# Patient Record
Sex: FEMALE | Race: WHITE | NOT HISPANIC OR LATINO | Employment: OTHER | URBAN - METROPOLITAN AREA
[De-identification: names, ages, dates, MRNs, and addresses within clinical notes are randomized per-mention and may not be internally consistent; named-entity substitution may affect disease eponyms.]

---

## 2017-06-14 ENCOUNTER — CONVERSION ENCOUNTER (OUTPATIENT)
Dept: GENERAL RADIOLOGY | Facility: HOSPITAL | Age: 62
End: 2017-06-14

## 2018-06-18 ENCOUNTER — OFFICE VISIT CONVERTED (OUTPATIENT)
Dept: FAMILY MEDICINE CLINIC | Facility: CLINIC | Age: 63
End: 2018-06-18
Attending: NURSE PRACTITIONER

## 2018-06-20 ENCOUNTER — CONVERSION ENCOUNTER (OUTPATIENT)
Dept: GENERAL RADIOLOGY | Facility: HOSPITAL | Age: 63
End: 2018-06-20

## 2018-07-03 ENCOUNTER — CONVERSION ENCOUNTER (OUTPATIENT)
Dept: FAMILY MEDICINE CLINIC | Facility: CLINIC | Age: 63
End: 2018-07-03

## 2018-07-03 ENCOUNTER — OFFICE VISIT CONVERTED (OUTPATIENT)
Dept: FAMILY MEDICINE CLINIC | Facility: CLINIC | Age: 63
End: 2018-07-03
Attending: NURSE PRACTITIONER

## 2018-09-17 ENCOUNTER — OFFICE VISIT CONVERTED (OUTPATIENT)
Dept: FAMILY MEDICINE CLINIC | Facility: CLINIC | Age: 63
End: 2018-09-17
Attending: NURSE PRACTITIONER

## 2018-09-17 ENCOUNTER — CONVERSION ENCOUNTER (OUTPATIENT)
Dept: FAMILY MEDICINE CLINIC | Facility: CLINIC | Age: 63
End: 2018-09-17

## 2018-09-28 ENCOUNTER — OFFICE VISIT CONVERTED (OUTPATIENT)
Dept: ORTHOPEDIC SURGERY | Facility: CLINIC | Age: 63
End: 2018-09-28
Attending: ORTHOPAEDIC SURGERY

## 2018-10-03 ENCOUNTER — CONVERSION ENCOUNTER (OUTPATIENT)
Dept: FAMILY MEDICINE CLINIC | Facility: CLINIC | Age: 63
End: 2018-10-03

## 2018-10-03 ENCOUNTER — OFFICE VISIT CONVERTED (OUTPATIENT)
Dept: FAMILY MEDICINE CLINIC | Facility: CLINIC | Age: 63
End: 2018-10-03
Attending: NURSE PRACTITIONER

## 2018-10-15 ENCOUNTER — OFFICE VISIT CONVERTED (OUTPATIENT)
Dept: ORTHOPEDIC SURGERY | Facility: CLINIC | Age: 63
End: 2018-10-15
Attending: ORTHOPAEDIC SURGERY

## 2018-12-18 ENCOUNTER — OFFICE VISIT CONVERTED (OUTPATIENT)
Dept: FAMILY MEDICINE CLINIC | Facility: CLINIC | Age: 63
End: 2018-12-18
Attending: FAMILY MEDICINE

## 2019-03-18 ENCOUNTER — OFFICE VISIT CONVERTED (OUTPATIENT)
Dept: FAMILY MEDICINE CLINIC | Facility: CLINIC | Age: 64
End: 2019-03-18
Attending: FAMILY MEDICINE

## 2019-03-18 ENCOUNTER — CONVERSION ENCOUNTER (OUTPATIENT)
Dept: FAMILY MEDICINE CLINIC | Facility: CLINIC | Age: 64
End: 2019-03-18

## 2019-04-22 ENCOUNTER — OFFICE VISIT CONVERTED (OUTPATIENT)
Dept: FAMILY MEDICINE CLINIC | Facility: CLINIC | Age: 64
End: 2019-04-22
Attending: NURSE PRACTITIONER

## 2019-05-02 ENCOUNTER — HOSPITAL ENCOUNTER (OUTPATIENT)
Dept: OTHER | Facility: HOSPITAL | Age: 64
Discharge: HOME | End: 2019-05-02
Attending: FAMILY MEDICINE

## 2019-05-02 ENCOUNTER — CONVERSION ENCOUNTER (OUTPATIENT)
Dept: FAMILY MEDICINE CLINIC | Facility: CLINIC | Age: 64
End: 2019-05-02

## 2019-05-02 ENCOUNTER — HOSPITAL ENCOUNTER (OUTPATIENT)
Dept: FAMILY MEDICINE CLINIC | Facility: CLINIC | Age: 64
Discharge: HOME | End: 2019-05-02
Attending: FAMILY MEDICINE

## 2019-05-02 ENCOUNTER — OFFICE VISIT CONVERTED (OUTPATIENT)
Dept: FAMILY MEDICINE CLINIC | Facility: CLINIC | Age: 64
End: 2019-05-02
Attending: FAMILY MEDICINE

## 2019-05-02 LAB
25(OH)D3 SERPL-MCNC: 35.2 NG/ML (ref 30–100)
ALBUMIN SERPL-MCNC: 4.6 G/DL (ref 3.5–5)
ALBUMIN/GLOB SERPL: 1.5 {RATIO} (ref 1.4–2.6)
ALP SERPL-CCNC: 78 U/L (ref 43–160)
ALT SERPL-CCNC: 22 U/L (ref 10–40)
ANION GAP SERPL CALC-SCNC: 19 MMOL/L (ref 8–19)
APPEARANCE UR: CLEAR
AST SERPL-CCNC: 23 U/L (ref 15–50)
BASOPHILS # BLD AUTO: 0.07 10*3/UL (ref 0–0.2)
BASOPHILS NFR BLD AUTO: 0.5 % (ref 0–3)
BILIRUB SERPL-MCNC: 0.42 MG/DL (ref 0.2–1.3)
BILIRUB UR QL: NEGATIVE
BUN SERPL-MCNC: 25 MG/DL (ref 5–25)
BUN/CREAT SERPL: 13 {RATIO} (ref 6–20)
CALCIUM SERPL-MCNC: 10.5 MG/DL (ref 8.7–10.4)
CHLORIDE SERPL-SCNC: 98 MMOL/L (ref 99–111)
CHOLEST SERPL-MCNC: 174 MG/DL (ref 107–200)
CHOLEST/HDLC SERPL: 5.4 {RATIO} (ref 3–6)
COLOR UR: YELLOW
CONV ABS IMM GRAN: 0.05 10*3/UL (ref 0–0.2)
CONV BACTERIA: NEGATIVE
CONV CO2: 23 MMOL/L (ref 22–32)
CONV COLLECTION SOURCE (UA): ABNORMAL
CONV CREATININE URINE, RANDOM: 134.2 MG/DL (ref 10–300)
CONV IMMATURE GRAN: 0.4 % (ref 0–1.8)
CONV MICROALBUM.,U,RANDOM: <12 MG/L (ref 0–20)
CONV TOTAL PROTEIN: 7.7 G/DL (ref 6.3–8.2)
CONV UROBILINOGEN IN URINE BY AUTOMATED TEST STRIP: 0.2 {EHRLICHU}/DL (ref 0.1–1)
CREAT UR-MCNC: 1.92 MG/DL (ref 0.5–0.9)
DEPRECATED RDW RBC AUTO: 43.5 FL (ref 36.4–46.3)
EOSINOPHIL # BLD AUTO: 0 % (ref 0–7)
EOSINOPHIL # BLD AUTO: 0 10*3/UL (ref 0–0.7)
ERYTHROCYTE [DISTWIDTH] IN BLOOD BY AUTOMATED COUNT: 12.9 % (ref 11.7–14.4)
EST. AVERAGE GLUCOSE BLD GHB EST-MCNC: 126 MG/DL
GFR SERPLBLD BASED ON 1.73 SQ M-ARVRAT: 27 ML/MIN/{1.73_M2}
GLOBULIN UR ELPH-MCNC: 3.1 G/DL (ref 2–3.5)
GLUCOSE SERPL-MCNC: 138 MG/DL (ref 65–99)
GLUCOSE UR QL: NEGATIVE MG/DL
HBA1C MFR BLD: 14.5 G/DL (ref 12–16)
HBA1C MFR BLD: 6 % (ref 3.5–5.7)
HCT VFR BLD AUTO: 46 % (ref 37–47)
HDLC SERPL-MCNC: 32 MG/DL (ref 40–60)
HGB UR QL STRIP: NEGATIVE
KETONES UR QL STRIP: NEGATIVE MG/DL
LDLC SERPL CALC-MCNC: 106 MG/DL (ref 70–100)
LEUKOCYTE ESTERASE UR QL STRIP: ABNORMAL
LYMPHOCYTES # BLD AUTO: 3.66 10*3/UL (ref 1–5)
MCH RBC QN AUTO: 29 PG (ref 27–31)
MCHC RBC AUTO-ENTMCNC: 31.5 G/DL (ref 33–37)
MCV RBC AUTO: 92 FL (ref 81–99)
MICROALBUMIN/CREAT UR: 8.9 MG/G{CRE} (ref 0–35)
MONOCYTES # BLD AUTO: 0.91 10*3/UL (ref 0.2–1.2)
MONOCYTES NFR BLD AUTO: 7 % (ref 3–10)
NEUTROPHILS # BLD AUTO: 8.25 10*3/UL (ref 2–8)
NEUTROPHILS NFR BLD AUTO: 63.8 % (ref 30–85)
NITRITE UR QL STRIP: NEGATIVE
NRBC CBCN: 0 % (ref 0–0.7)
OSMOLALITY SERPL CALC.SUM OF ELEC: 287 MOSM/KG (ref 273–304)
PH UR STRIP.AUTO: 5 [PH] (ref 5–8)
PLATELET # BLD AUTO: 335 10*3/UL (ref 130–400)
PMV BLD AUTO: 11.4 FL (ref 9.4–12.3)
POTASSIUM SERPL-SCNC: 5.3 MMOL/L (ref 3.5–5.3)
PROT UR QL: NEGATIVE MG/DL
RBC # BLD AUTO: 5 10*6/UL (ref 4.2–5.4)
RBC #/AREA URNS HPF: ABNORMAL /[HPF]
SODIUM SERPL-SCNC: 135 MMOL/L (ref 135–147)
SP GR UR: 1.02 (ref 1–1.03)
SQUAMOUS SPT QL MICRO: ABNORMAL /[HPF]
TRIGL SERPL-MCNC: 182 MG/DL (ref 40–150)
VARIANT LYMPHS NFR BLD MANUAL: 28.3 % (ref 20–45)
VLDLC SERPL-MCNC: 36 MG/DL (ref 5–37)
WBC # BLD AUTO: 12.94 10*3/UL (ref 4.8–10.8)
WBC #/AREA URNS HPF: ABNORMAL /[HPF]

## 2019-05-04 LAB — BACTERIA SPEC AEROBE CULT: NORMAL

## 2019-05-10 ENCOUNTER — HOSPITAL ENCOUNTER (OUTPATIENT)
Dept: OTHER | Facility: HOSPITAL | Age: 64
Discharge: HOME | End: 2019-05-10
Attending: FAMILY MEDICINE

## 2019-05-10 LAB
ALBUMIN SERPL-MCNC: 4.2 G/DL (ref 3.5–5)
ALBUMIN/GLOB SERPL: 1.6 {RATIO} (ref 1.4–2.6)
ALP SERPL-CCNC: 74 U/L (ref 43–160)
ALT SERPL-CCNC: 21 U/L (ref 10–40)
ANION GAP SERPL CALC-SCNC: 17 MMOL/L (ref 8–19)
AST SERPL-CCNC: 19 U/L (ref 15–50)
BILIRUB SERPL-MCNC: 0.4 MG/DL (ref 0.2–1.3)
BUN SERPL-MCNC: 24 MG/DL (ref 5–25)
BUN/CREAT SERPL: 20 {RATIO} (ref 6–20)
CALCIUM SERPL-MCNC: 9.3 MG/DL (ref 8.7–10.4)
CHLORIDE SERPL-SCNC: 105 MMOL/L (ref 99–111)
CONV CO2: 23 MMOL/L (ref 22–32)
CONV TOTAL PROTEIN: 6.8 G/DL (ref 6.3–8.2)
CREAT UR-MCNC: 1.21 MG/DL (ref 0.5–0.9)
GFR SERPLBLD BASED ON 1.73 SQ M-ARVRAT: 47 ML/MIN/{1.73_M2}
GLOBULIN UR ELPH-MCNC: 2.6 G/DL (ref 2–3.5)
GLUCOSE SERPL-MCNC: 214 MG/DL (ref 65–99)
OSMOLALITY SERPL CALC.SUM OF ELEC: 300 MOSM/KG (ref 273–304)
POTASSIUM SERPL-SCNC: 4.5 MMOL/L (ref 3.5–5.3)
SODIUM SERPL-SCNC: 140 MMOL/L (ref 135–147)

## 2019-06-18 ENCOUNTER — HOSPITAL ENCOUNTER (OUTPATIENT)
Dept: FAMILY MEDICINE CLINIC | Facility: CLINIC | Age: 64
Discharge: HOME | End: 2019-06-18
Attending: FAMILY MEDICINE

## 2019-06-18 ENCOUNTER — OFFICE VISIT CONVERTED (OUTPATIENT)
Dept: FAMILY MEDICINE CLINIC | Facility: CLINIC | Age: 64
End: 2019-06-18
Attending: FAMILY MEDICINE

## 2019-06-24 ENCOUNTER — HOSPITAL ENCOUNTER (OUTPATIENT)
Dept: GENERAL RADIOLOGY | Facility: HOSPITAL | Age: 64
Discharge: HOME | End: 2019-06-24
Attending: FAMILY MEDICINE

## 2019-06-26 LAB
CONV LAST MENSTURAL PERIOD: NORMAL
SPECIMEN SOURCE: NORMAL
SPECIMEN SOURCE: NORMAL
THIN PREP CVX: NORMAL

## 2019-07-09 ENCOUNTER — HOSPITAL ENCOUNTER (OUTPATIENT)
Dept: OTHER | Facility: HOSPITAL | Age: 64
Discharge: HOME | End: 2019-07-09
Attending: FAMILY MEDICINE

## 2019-07-09 LAB
ANION GAP SERPL CALC-SCNC: 15 MMOL/L (ref 8–19)
BUN SERPL-MCNC: 16 MG/DL (ref 5–25)
BUN/CREAT SERPL: 12 {RATIO} (ref 6–20)
CALCIUM SERPL-MCNC: 9.4 MG/DL (ref 8.7–10.4)
CHLORIDE SERPL-SCNC: 105 MMOL/L (ref 99–111)
CONV CO2: 26 MMOL/L (ref 22–32)
CREAT UR-MCNC: 1.36 MG/DL (ref 0.5–0.9)
GFR SERPLBLD BASED ON 1.73 SQ M-ARVRAT: 41 ML/MIN/{1.73_M2}
GLUCOSE SERPL-MCNC: 150 MG/DL (ref 65–99)
OSMOLALITY SERPL CALC.SUM OF ELEC: 296 MOSM/KG (ref 273–304)
POTASSIUM SERPL-SCNC: 5 MMOL/L (ref 3.5–5.3)
SODIUM SERPL-SCNC: 141 MMOL/L (ref 135–147)

## 2019-07-16 ENCOUNTER — CONVERSION ENCOUNTER (OUTPATIENT)
Dept: FAMILY MEDICINE CLINIC | Facility: CLINIC | Age: 64
End: 2019-07-16

## 2019-07-16 ENCOUNTER — OFFICE VISIT CONVERTED (OUTPATIENT)
Dept: FAMILY MEDICINE CLINIC | Facility: CLINIC | Age: 64
End: 2019-07-16
Attending: FAMILY MEDICINE

## 2020-05-06 ENCOUNTER — OFFICE VISIT CONVERTED (OUTPATIENT)
Dept: FAMILY MEDICINE CLINIC | Facility: CLINIC | Age: 65
End: 2020-05-06
Attending: FAMILY MEDICINE

## 2020-06-11 ENCOUNTER — HOSPITAL ENCOUNTER (OUTPATIENT)
Dept: FAMILY MEDICINE CLINIC | Facility: CLINIC | Age: 65
Discharge: HOME | End: 2020-06-11
Attending: FAMILY MEDICINE

## 2020-06-11 LAB
ALBUMIN SERPL-MCNC: 4.5 G/DL (ref 3.5–5)
ALBUMIN/GLOB SERPL: 1.7 {RATIO} (ref 1.4–2.6)
ALP SERPL-CCNC: 68 U/L (ref 43–160)
ALT SERPL-CCNC: 36 U/L (ref 10–40)
ANION GAP SERPL CALC-SCNC: 16 MMOL/L (ref 8–19)
APPEARANCE UR: CLEAR
AST SERPL-CCNC: 44 U/L (ref 15–50)
BASOPHILS # BLD AUTO: 0.05 10*3/UL (ref 0–0.2)
BASOPHILS NFR BLD AUTO: 0.7 % (ref 0–3)
BILIRUB SERPL-MCNC: 0.53 MG/DL (ref 0.2–1.3)
BILIRUB UR QL: NEGATIVE
BUN SERPL-MCNC: 18 MG/DL (ref 5–25)
BUN/CREAT SERPL: 15 {RATIO} (ref 6–20)
CALCIUM SERPL-MCNC: 9.9 MG/DL (ref 8.7–10.4)
CHLORIDE SERPL-SCNC: 103 MMOL/L (ref 99–111)
CHOLEST SERPL-MCNC: 165 MG/DL (ref 107–200)
CHOLEST/HDLC SERPL: 5.2 {RATIO} (ref 3–6)
COLOR UR: YELLOW
CONV ABS IMM GRAN: 0.04 10*3/UL (ref 0–0.2)
CONV CO2: 25 MMOL/L (ref 22–32)
CONV COLLECTION SOURCE (UA): NORMAL
CONV CREATININE URINE, RANDOM: 96.2 MG/DL (ref 10–300)
CONV IMMATURE GRAN: 0.6 % (ref 0–1.8)
CONV MICROALBUM.,U,RANDOM: <12 MG/L (ref 0–20)
CONV TOTAL PROTEIN: 7.2 G/DL (ref 6.3–8.2)
CONV UROBILINOGEN IN URINE BY AUTOMATED TEST STRIP: 0.2 {EHRLICHU}/DL (ref 0.1–1)
CREAT UR-MCNC: 1.24 MG/DL (ref 0.5–0.9)
DEPRECATED RDW RBC AUTO: 43.8 FL (ref 36.4–46.3)
EOSINOPHIL # BLD AUTO: 0 % (ref 0–7)
EOSINOPHIL # BLD AUTO: 0 10*3/UL (ref 0–0.7)
ERYTHROCYTE [DISTWIDTH] IN BLOOD BY AUTOMATED COUNT: 13.2 % (ref 11.7–14.4)
EST. AVERAGE GLUCOSE BLD GHB EST-MCNC: 192 MG/DL
GFR SERPLBLD BASED ON 1.73 SQ M-ARVRAT: 46 ML/MIN/{1.73_M2}
GLOBULIN UR ELPH-MCNC: 2.7 G/DL (ref 2–3.5)
GLUCOSE SERPL-MCNC: 168 MG/DL (ref 65–99)
GLUCOSE UR QL: NEGATIVE MG/DL
HBA1C MFR BLD: 8.3 % (ref 3.5–5.7)
HCT VFR BLD AUTO: 39.8 % (ref 37–47)
HDLC SERPL-MCNC: 32 MG/DL (ref 40–60)
HGB BLD-MCNC: 12.7 G/DL (ref 12–16)
HGB UR QL STRIP: NEGATIVE
KETONES UR QL STRIP: NEGATIVE MG/DL
LDLC SERPL CALC-MCNC: 91 MG/DL (ref 70–100)
LEUKOCYTE ESTERASE UR QL STRIP: NEGATIVE
LYMPHOCYTES # BLD AUTO: 2.03 10*3/UL (ref 1–5)
LYMPHOCYTES NFR BLD AUTO: 30.1 % (ref 20–45)
MCH RBC QN AUTO: 29.2 PG (ref 27–31)
MCHC RBC AUTO-ENTMCNC: 31.9 G/DL (ref 33–37)
MCV RBC AUTO: 91.5 FL (ref 81–99)
MICROALBUMIN/CREAT UR: 12.5 MG/G{CRE} (ref 0–35)
MONOCYTES # BLD AUTO: 0.66 10*3/UL (ref 0.2–1.2)
MONOCYTES NFR BLD AUTO: 9.8 % (ref 3–10)
NEUTROPHILS # BLD AUTO: 3.96 10*3/UL (ref 2–8)
NEUTROPHILS NFR BLD AUTO: 58.8 % (ref 30–85)
NITRITE UR QL STRIP: NEGATIVE
NRBC CBCN: 0 % (ref 0–0.7)
OSMOLALITY SERPL CALC.SUM OF ELEC: 294 MOSM/KG (ref 273–304)
PH UR STRIP.AUTO: 7 [PH] (ref 5–8)
PLATELET # BLD AUTO: 251 10*3/UL (ref 130–400)
PMV BLD AUTO: 10.8 FL (ref 9.4–12.3)
POTASSIUM SERPL-SCNC: 4.5 MMOL/L (ref 3.5–5.3)
PROT UR QL: NEGATIVE MG/DL
RBC # BLD AUTO: 4.35 10*6/UL (ref 4.2–5.4)
SODIUM SERPL-SCNC: 139 MMOL/L (ref 135–147)
SP GR UR: 1.02 (ref 1–1.03)
TRIGL SERPL-MCNC: 212 MG/DL (ref 40–150)
VLDLC SERPL-MCNC: 42 MG/DL (ref 5–37)
WBC # BLD AUTO: 6.74 10*3/UL (ref 4.8–10.8)

## 2020-06-15 ENCOUNTER — OFFICE VISIT CONVERTED (OUTPATIENT)
Dept: FAMILY MEDICINE CLINIC | Facility: CLINIC | Age: 65
End: 2020-06-15
Attending: FAMILY MEDICINE

## 2020-09-10 ENCOUNTER — HOSPITAL ENCOUNTER (OUTPATIENT)
Dept: GENERAL RADIOLOGY | Facility: HOSPITAL | Age: 65
Discharge: HOME | End: 2020-09-10
Attending: FAMILY MEDICINE

## 2020-09-10 ENCOUNTER — HOSPITAL ENCOUNTER (OUTPATIENT)
Dept: FAMILY MEDICINE CLINIC | Facility: CLINIC | Age: 65
Discharge: HOME | End: 2020-09-10
Attending: FAMILY MEDICINE

## 2020-09-10 LAB
ALBUMIN SERPL-MCNC: 4.3 G/DL (ref 3.5–5)
ALBUMIN/GLOB SERPL: 1.6 {RATIO} (ref 1.4–2.6)
ALP SERPL-CCNC: 189 U/L (ref 43–160)
ALT SERPL-CCNC: 85 U/L (ref 10–40)
ANION GAP SERPL CALC-SCNC: 19 MMOL/L (ref 8–19)
AST SERPL-CCNC: 63 U/L (ref 15–50)
BILIRUB SERPL-MCNC: 0.8 MG/DL (ref 0.2–1.3)
BUN SERPL-MCNC: 13 MG/DL (ref 5–25)
BUN/CREAT SERPL: 14 {RATIO} (ref 6–20)
CALCIUM SERPL-MCNC: 9.5 MG/DL (ref 8.7–10.4)
CHLORIDE SERPL-SCNC: 101 MMOL/L (ref 99–111)
CHOLEST SERPL-MCNC: 141 MG/DL (ref 107–200)
CHOLEST/HDLC SERPL: 4 {RATIO} (ref 3–6)
CONV CO2: 22 MMOL/L (ref 22–32)
CONV TOTAL PROTEIN: 7 G/DL (ref 6.3–8.2)
CREAT UR-MCNC: 0.93 MG/DL (ref 0.5–0.9)
EST. AVERAGE GLUCOSE BLD GHB EST-MCNC: 209 MG/DL
GFR SERPLBLD BASED ON 1.73 SQ M-ARVRAT: >60 ML/MIN/{1.73_M2}
GLOBULIN UR ELPH-MCNC: 2.7 G/DL (ref 2–3.5)
GLUCOSE SERPL-MCNC: 222 MG/DL (ref 65–99)
HBA1C MFR BLD: 8.9 % (ref 3.5–5.7)
HDLC SERPL-MCNC: 35 MG/DL (ref 40–60)
LDLC SERPL CALC-MCNC: 80 MG/DL (ref 70–100)
OSMOLALITY SERPL CALC.SUM OF ELEC: 291 MOSM/KG (ref 273–304)
POTASSIUM SERPL-SCNC: 4.5 MMOL/L (ref 3.5–5.3)
SODIUM SERPL-SCNC: 137 MMOL/L (ref 135–147)
TRIGL SERPL-MCNC: 130 MG/DL (ref 40–150)
VLDLC SERPL-MCNC: 26 MG/DL (ref 5–37)

## 2020-09-14 ENCOUNTER — OFFICE VISIT CONVERTED (OUTPATIENT)
Dept: FAMILY MEDICINE CLINIC | Facility: CLINIC | Age: 65
End: 2020-09-14
Attending: FAMILY MEDICINE

## 2020-11-19 ENCOUNTER — HOSPITAL ENCOUNTER (OUTPATIENT)
Dept: FAMILY MEDICINE CLINIC | Facility: CLINIC | Age: 65
Discharge: HOME | End: 2020-11-19
Attending: FAMILY MEDICINE

## 2020-11-19 LAB
ALBUMIN SERPL-MCNC: 3.8 G/DL (ref 3.5–5)
ALBUMIN/GLOB SERPL: 1.3 {RATIO} (ref 1.4–2.6)
ALP SERPL-CCNC: 102 U/L (ref 43–160)
ALT SERPL-CCNC: 36 U/L (ref 10–40)
ANION GAP SERPL CALC-SCNC: 15 MMOL/L (ref 8–19)
AST SERPL-CCNC: 36 U/L (ref 15–50)
BASOPHILS # BLD AUTO: 0.02 10*3/UL (ref 0–0.2)
BASOPHILS NFR BLD AUTO: 0.4 % (ref 0–3)
BILIRUB SERPL-MCNC: 0.38 MG/DL (ref 0.2–1.3)
BUN SERPL-MCNC: 14 MG/DL (ref 5–25)
BUN/CREAT SERPL: 12 {RATIO} (ref 6–20)
CALCIUM SERPL-MCNC: 10 MG/DL (ref 8.7–10.4)
CHLORIDE SERPL-SCNC: 103 MMOL/L (ref 99–111)
CONV ABS IMM GRAN: 0.02 10*3/UL (ref 0–0.2)
CONV CO2: 24 MMOL/L (ref 22–32)
CONV IMMATURE GRAN: 0.4 % (ref 0–1.8)
CONV TOTAL PROTEIN: 6.8 G/DL (ref 6.3–8.2)
CREAT UR-MCNC: 1.19 MG/DL (ref 0.5–0.9)
DEPRECATED RDW RBC AUTO: 45.1 FL (ref 36.4–46.3)
EOSINOPHIL # BLD AUTO: 0 % (ref 0–7)
EOSINOPHIL # BLD AUTO: 0 10*3/UL (ref 0–0.7)
ERYTHROCYTE [DISTWIDTH] IN BLOOD BY AUTOMATED COUNT: 13.6 % (ref 11.7–14.4)
GFR SERPLBLD BASED ON 1.73 SQ M-ARVRAT: 48 ML/MIN/{1.73_M2}
GLOBULIN UR ELPH-MCNC: 3 G/DL (ref 2–3.5)
GLUCOSE SERPL-MCNC: 155 MG/DL (ref 65–99)
HCT VFR BLD AUTO: 39 % (ref 37–47)
HGB BLD-MCNC: 12.1 G/DL (ref 12–16)
LYMPHOCYTES # BLD AUTO: 2.03 10*3/UL (ref 1–5)
LYMPHOCYTES NFR BLD AUTO: 36.7 % (ref 20–45)
MCH RBC QN AUTO: 28.1 PG (ref 27–31)
MCHC RBC AUTO-ENTMCNC: 31 G/DL (ref 33–37)
MCV RBC AUTO: 90.5 FL (ref 81–99)
MONOCYTES # BLD AUTO: 0.5 10*3/UL (ref 0.2–1.2)
MONOCYTES NFR BLD AUTO: 9 % (ref 3–10)
NEUTROPHILS # BLD AUTO: 2.96 10*3/UL (ref 2–8)
NEUTROPHILS NFR BLD AUTO: 53.5 % (ref 30–85)
NRBC CBCN: 0 % (ref 0–0.7)
OSMOLALITY SERPL CALC.SUM OF ELEC: 288 MOSM/KG (ref 273–304)
PLATELET # BLD AUTO: 201 10*3/UL (ref 130–400)
PMV BLD AUTO: 11.2 FL (ref 9.4–12.3)
POTASSIUM SERPL-SCNC: 4.8 MMOL/L (ref 3.5–5.3)
RBC # BLD AUTO: 4.31 10*6/UL (ref 4.2–5.4)
SODIUM SERPL-SCNC: 137 MMOL/L (ref 135–147)
WBC # BLD AUTO: 5.53 10*3/UL (ref 4.8–10.8)

## 2020-12-14 ENCOUNTER — TELEPHONE CONVERTED (OUTPATIENT)
Dept: FAMILY MEDICINE CLINIC | Facility: CLINIC | Age: 65
End: 2020-12-14
Attending: FAMILY MEDICINE

## 2021-01-18 ENCOUNTER — HOSPITAL ENCOUNTER (OUTPATIENT)
Dept: URGENT CARE | Facility: CLINIC | Age: 66
Discharge: HOME | End: 2021-01-18
Attending: FAMILY MEDICINE

## 2021-01-27 ENCOUNTER — CONVERSION ENCOUNTER (OUTPATIENT)
Dept: ORTHOPEDIC SURGERY | Facility: CLINIC | Age: 66
End: 2021-01-27

## 2021-01-27 ENCOUNTER — OFFICE VISIT CONVERTED (OUTPATIENT)
Dept: ORTHOPEDIC SURGERY | Facility: CLINIC | Age: 66
End: 2021-01-27
Attending: PHYSICIAN ASSISTANT

## 2021-02-10 ENCOUNTER — CONVERSION ENCOUNTER (OUTPATIENT)
Dept: ORTHOPEDIC SURGERY | Facility: CLINIC | Age: 66
End: 2021-02-10

## 2021-02-10 ENCOUNTER — OFFICE VISIT CONVERTED (OUTPATIENT)
Dept: ORTHOPEDIC SURGERY | Facility: CLINIC | Age: 66
End: 2021-02-10
Attending: PHYSICIAN ASSISTANT

## 2021-03-15 ENCOUNTER — HOSPITAL ENCOUNTER (OUTPATIENT)
Dept: FAMILY MEDICINE CLINIC | Facility: CLINIC | Age: 66
Discharge: HOME | End: 2021-03-15
Attending: FAMILY MEDICINE

## 2021-03-15 ENCOUNTER — OFFICE VISIT CONVERTED (OUTPATIENT)
Dept: FAMILY MEDICINE CLINIC | Facility: CLINIC | Age: 66
End: 2021-03-15
Attending: FAMILY MEDICINE

## 2021-03-15 LAB
ALBUMIN SERPL-MCNC: 4.6 G/DL (ref 3.5–5)
ALBUMIN/GLOB SERPL: 1.6 {RATIO} (ref 1.4–2.6)
ALP SERPL-CCNC: 81 U/L (ref 43–160)
ALT SERPL-CCNC: 24 U/L (ref 10–40)
ANION GAP SERPL CALC-SCNC: 17 MMOL/L (ref 8–19)
APPEARANCE UR: ABNORMAL
AST SERPL-CCNC: 22 U/L (ref 15–50)
BILIRUB SERPL-MCNC: 0.38 MG/DL (ref 0.2–1.3)
BILIRUB UR QL: NEGATIVE
BUN SERPL-MCNC: 13 MG/DL (ref 5–25)
BUN/CREAT SERPL: 13 {RATIO} (ref 6–20)
CALCIUM SERPL-MCNC: 9.8 MG/DL (ref 8.7–10.4)
CHLORIDE SERPL-SCNC: 100 MMOL/L (ref 99–111)
COLOR UR: YELLOW
CONV BACTERIA: NEGATIVE
CONV CO2: 24 MMOL/L (ref 22–32)
CONV COLLECTION SOURCE (UA): ABNORMAL
CONV CRYSTALS: ABNORMAL /[HPF]
CONV HYALINE CASTS IN URINE MICRO: ABNORMAL /[LPF]
CONV TOTAL PROTEIN: 7.5 G/DL (ref 6.3–8.2)
CONV UROBILINOGEN IN URINE BY AUTOMATED TEST STRIP: 0.2 {EHRLICHU}/DL (ref 0.1–1)
CREAT UR-MCNC: 0.99 MG/DL (ref 0.5–0.9)
EST. AVERAGE GLUCOSE BLD GHB EST-MCNC: 177 MG/DL
GFR SERPLBLD BASED ON 1.73 SQ M-ARVRAT: 60 ML/MIN/{1.73_M2}
GLOBULIN UR ELPH-MCNC: 2.9 G/DL (ref 2–3.5)
GLUCOSE SERPL-MCNC: 127 MG/DL (ref 65–99)
GLUCOSE UR QL: NEGATIVE MG/DL
HBA1C MFR BLD: 7.8 % (ref 3.5–5.7)
HGB UR QL STRIP: NEGATIVE
KETONES UR QL STRIP: NEGATIVE MG/DL
LEUKOCYTE ESTERASE UR QL STRIP: ABNORMAL
NITRITE UR QL STRIP: NEGATIVE
OSMOLALITY SERPL CALC.SUM OF ELEC: 286 MOSM/KG (ref 273–304)
PH UR STRIP.AUTO: 5 [PH] (ref 5–8)
POTASSIUM SERPL-SCNC: 4.2 MMOL/L (ref 3.5–5.3)
PROT UR QL: NEGATIVE MG/DL
RBC #/AREA URNS HPF: ABNORMAL /[HPF]
SODIUM SERPL-SCNC: 137 MMOL/L (ref 135–147)
SP GR UR: 1.02 (ref 1–1.03)
SQUAMOUS SPT QL MICRO: ABNORMAL /[HPF]
URATE SERPL-MCNC: 5.5 MG/DL (ref 2.5–7.5)
WBC #/AREA URNS HPF: ABNORMAL /[HPF]

## 2021-03-16 LAB
CONV CREATININE URINE, RANDOM: 151.5 MG/DL (ref 10–300)
CONV MICROALBUM.,U,RANDOM: <12 MG/L (ref 0–20)
MICROALBUMIN/CREAT UR: 7.9 MG/G{CRE} (ref 0–35)

## 2021-05-10 NOTE — H&P
History and Physical      Patient Name: Leilani Ordaz   Patient ID: [id number]   Sex: Female   YOB: 1955    Primary Care Provider: Js Tavares DO   Referring Provider: Js Tavares DO    Visit Date: January 27, 2021    Provider: Onelia Irwin PA-C   Location: [facility]   Location Address: [address] [place] [address]  [id number]   Location Phone: [phone]          Chief Complaint  · Left 3rd toe injury      History Of Present Illness  Leilani Ordaz is a 65 year old /White female who presents today to [facility].      She is here for evaluation of left middle toe injury sustained on 1/16/21, when she kicked a chair inadvertently.       Past Medical History  Arthritis; Breast cancer screening; Broken Bones; Cataract; Cervical cancer screening; Colon cancer screening; Diabetes; Diverticulosis; Ganglion cyst; Hyperlipemia; Hyperlipidemia; Hypertension; Hypertension, unspecified type; Limb Swelling; Migraine; Night sweats; Reflux; Screening for breast cancer; Seasonal allergies; Seizure; Sinus trouble; Skin Disease; Type 2 diabetes mellitus with stage 3 chronic kidney disease, without long-term current use of insulin         Past Surgical History  Colonoscopy         Medication List  aspirin 81 mg oral tablet,delayed release (DR/EC); atorvastatin 80 mg oral tablet; Claritin 10 mg oral tablet; Fish Oil oral; lidocaine 5 % topical adhesive patch,medicated; lisinopril 20 mg oral tablet; metformin 1,000 mg oral tablet; Women's Multivitamin 18 mg iron-400 mcg-500 mg oral tablet         Allergy List  Caltrate 600 + D       Allergies Reconciled  Family Medical History  Heart Disease; Diabetes, unspecified type; - No Family History of Colorectal Cancer; Diabetes; Family history of certain chronic disabling diseases; arthritis; Family history of Arthritis         Social History  Alcohol; Alcohol Use (Current some day); lives with spouse; .; Moderate  "Amount of Exercise (1-3 times weekly); Recreational Drug Use (Former); Tobacco (Never); Working         Review of Systems  · Constitutional  o Denies  o : fever, chills, weight loss  · Cardiovascular  o Denies  o : chest pain, shortness of breath  · Gastrointestinal  o Denies  o : liver disease, heartburn, nausea, blood in stools  · Genitourinary  o Denies  o : painful urination, blood in urine  · Integument  o Denies  o : rash, itching  · Neurologic  o Denies  o : headache, weakness, loss of consciousness  · Musculoskeletal  o Admits  o : painful, swollen joints  · Psychiatric  o Denies  o : drug/alcohol addiction, anxiety, depression      Vitals  Date Time BP Position Site L\R Cuff Size HR RR TEMP (F) WT  HT  BMI kg/m2 BSA m2 O2 Sat FR L/min FiO2 HC       01/27/2021 10:13 AM      79 - R   180lbs 0oz 5'  4\" 30.9 1.92 95 %            Physical Examination  · Constitutional  o Appearance  o : well developed, well-nourished, no obvious deformities present  · Head and Face  o Head  o :   § Inspection  § : normocephalic  o Face  o :   § Inspection  § : no facial lesions  · Eyes  o Conjunctivae  o : conjunctivae normal  o Sclerae  o : sclerae white  · Ears, Nose, Mouth and Throat  o Ears  o :   § External Ears  § : appearance within normal limits  § Hearing  § : intact  o Nose  o :   § External Nose  § : appearance normal  · Neck  o Inspection/Palpation  o : normal appearance  o Range of Motion  o : full range of motion  · Respiratory  o Respiratory Effort  o : breathing unlabored  o Inspection of Chest  o : normal appearance  o Auscultation of Lungs  o : no audible wheezing or rales  · Cardiovascular  o Heart  o : regular rate  · Gastrointestinal  o Abdominal Examination  o : soft and non-tender  · Skin and Subcutaneous Tissue  o General Inspection  o : intact, no rashes  · Psychiatric  o General  o : Alert and oriented x3  o Judgement and Insight  o : judgment and insight intact  o Mood and Affect  o : mood normal, " affect appropriate  · Left Ankle/Foot  o Inspection  o : Mild swelling proximal middle toe. Tender to palpation. Brisk capillary refill. Neurovascularly intact. Using flat soled shoe.           Assessment  · Left foot pain     729.5/M79.672  · Fracture of toe     826.0/S92.919A      Plan  · Medications  o Medications have been Reconciled  o Transition of Care or Provider Policy  · Instructions  o Reviewed the patient's Past Medical, Social, and Family history as well as the ROS at today's visit, no changes.  o Call or return if worsening symptoms.  o Continue xiomara taping and flat soled shoe. Follow up 2 weeks, repeat x-ray.  o Electronically Identified Patient Education Materials Provided Electronically            Electronically Signed by: Onelia Irwin PA-C -Author on January 27, 2021 10:34:38 AM

## 2021-05-13 NOTE — PROGRESS NOTES
Progress Note      Patient Name: Leilani Ordaz   Patient ID: [id number]   Sex: Female   YOB: 1955    Primary Care Provider: Js Tavares DO   Referring Provider: Js Tavares DO    Visit Date: May 6, 2020    Provider: Js Tavares DO   Location: [facility]   Location Address: [address] [place] [address], [address]  [id number]   Location Phone: [phone]          Chief Complaint  · F/U       History Of Present Illness  Leilani Ordaz is a 64 year old /White female who presents for evaluation and treatment of:      Today for checkup.  I have not seen her since July 2019.  She does need medications refilled and we discussed getting labs done.  Her blood pressure is elevated today.  She was checking her blood pressure at home but this was back around August September timeframe.  She does have some fluctuations including several that are within normal limits but then she also has several with a systolic being in the 130s to 140s and a couple isolated in the 150s.  She has a couple outlier blood pressures around 100/54.  She reports compliance with her medications.  Again discussed getting labs and having her bring a log to the next visit to reassess.  She will be due for mammogram around June 2020.  I will go ahead and place this order.  Patient had Pap smear last year.  She was negative for dysplasia.  Discussed repeating in 2022.  She is  and in a monogamous relationship.       Past Medical History  Arthritis; Breast cancer screening; Broken Bones; Cataract; Cervical cancer screening; Colon cancer screening; Diabetes; Diverticulosis; Ganglion cyst; Hyperlipemia; Hyperlipidemia; Hypertension; Hypertension, unspecified type; Limb Swelling; Migraine; Night sweats; Reflux; Screening for breast cancer; Seasonal allergies; Seizure; Sinus trouble; Skin Disease; Type 2 diabetes mellitus with stage 3 chronic kidney disease, without long-term current use of insulin  "        Past Surgical History  Colonoscopy         Medication List  aspirin 81 mg oral tablet,delayed release (DR/EC); Claritin 10 mg oral tablet; Fish Oil oral; lisinopril 20 mg oral tablet; metformin 1,000 mg oral tablet; pravastatin 80 mg oral tablet; Women's Multivitamin 18 mg iron-400 mcg-500 mg oral tablet         Allergy List  Caltrate 600 + D         Family Medical History  Heart Disease; Diabetes, unspecified type; - No Family History of Colorectal Cancer; Diabetes; Family history of certain chronic disabling diseases; arthritis         Social History  Alcohol; Alcohol Use (Current some day); lives with spouse; .; Moderate Amount of Exercise (1-3 times weekly); Recreational Drug Use (Never); Tobacco (Never); Working         Immunizations  Name Date Admin   Hepatitis A    Hepatitis A          Review of Systems     General: Denies any fever, chills, weight changes, or night sweats  HEENT:  Denies any vision or hearing changes. Denies any neck tenderness. Denies any headaches. Denies nasal congestion  Cardiovascular: Denies any chest pain or palpitations  respiratory: Denies any cough or wheezing. Denies any shortness of breath  Gastrointestinal: Denies any nausea vomiting or diarrhea, Denies constipation  Extremities: Denies any edema  Psychiatric: Denies any changes in mood or affect  Neurologic: Denies any neurologic deficits  skin: Denies any rashes or lesions.  endocrine: Denies any weight loss, fever, night sweats  Musculoskeletal: Denies any weakness       Vitals  Date Time BP Position Site L\R Cuff Size HR RR TEMP (F) WT  HT  BMI kg/m2 BSA m2 O2 Sat        05/06/2020 11:30 /78 Sitting    88 - R 14 98.3 187lbs 0oz 5'  4\" 32.1 1.96 99 %          Physical Examination     General: AAO 3, no acute distress, pleasant  HEENT: Normocephalic, atraumatic, no discharge in the eyes, no discharge from the nose, no oropharyngeal erythema or exudates, and TMs intact bilaterally with no erythema, no " cervical tenderness or lymphadenopathy  Cardiovascular: Regular rate and rhythm without appreciable murmur  Respiratory: Clear to auscultation bilaterally no RRW  Gastrointestinal: Soft nontender nondistended with bowel sounds present  extremities: No clubbing, cyanosis or edema  Neurologic: CN II through XII grossly intact   Psychiatric: Normal mood and affect           Assessment  · Visit for screening mammogram     V76.12/Z12.31  · Hypertension     401.9/I10  · HLD (hyperlipidemia)     272.4/E78.5  · Type 2 diabetes mellitus     250.00/E11.9  · Medication monitoring encounter     V58.83/Z51.81  · CKD (chronic kidney disease)     585.9/N18.9    Problems Reconciled  Plan  · Orders  o Screening Mammography; Bilateral 3D (51732, , 70347) - V76.12/Z12.31 - 07/06/2020  o CBC with Auto Diff [facility] (56310) - 401.9/I10, 250.00/E11.9, V58.83/Z51.81 - 06/06/2020  o CMP [facility] (61900) - 401.9/I10, 250.00/E11.9, V58.83/Z51.81 - 06/06/2020  o Hgb A1c [facility] (17295) - 250.00/E11.9, V58.83/Z51.81 - 06/06/2020  o Lipid Panel [facility] (92177) - 272.4/E78.5 - 06/06/2020  o Urinalysis with Reflex Microscopy if abnormal ([facility]) (67002) - 250.00/E11.9, V58.83/Z51.81, 585.9/N18.9 - 06/06/2020  o ACO-39: Current medications updated and reviewed () - - 05/06/2020  o ACO-14: Influenza immunization was not administered for reasons documented () - - 05/06/2020   declined  o Albumin/creat ur test strip (47604, 35274) - 250.00/E11.9, V58.83/Z51.81 - 06/06/2020  · Medications  o metformin 1,000 mg oral tablet   SIG: take 0.5 tablet by oral route daily for 90 days   DISP: (45) tablet with 3 refills  Adjusted on 05/06/2020     o aspirin 81 mg oral tablet,delayed release (DR/EC)   SIG: take 1 tablet (81 mg) by oral route once daily for 90 days   DISP: (90) tablets with 3 refills  Refilled on 05/06/2020     o Claritin 10 mg oral tablet   SIG: take 1 tablet (10 mg) by oral route once daily for 90 days   DISP: (90) tablets with 3 refills  Refilled on  05/06/2020     o lisinopril 20 mg oral tablet   SIG: take 1 tablet (20 mg) by oral route once daily for 90 days   DISP: (90) Tablet with 3 refills  Refilled on 05/06/2020     o pravastatin 80 mg oral tablet   SIG: take 1 tablet (80 mg) by oral route once daily for 30 days   DISP: (30) tablet with 0 refills  Refilled on 05/06/2020     o Medications have been Reconciled  o Transition of Care or Provider Policy  · Instructions  o Patient was educated/instructed on their diagnosis, treatment and medications prior to discharge from the clinic today.  o Plan as documented above. Discussed seeing patient back in 1 month with her blood pressure log. Will make changes as needed. I will also check her kidney function as well as her lipid panel. Patient to call with any questions or concerns.  · Disposition  o f/u 1 month            Electronically Signed by: Js Tavares DO -Author on May 6, 2020 12:28:56 PM

## 2021-05-13 NOTE — PROGRESS NOTES
Quick Note      Patient Name: Leilani Ordaz   Patient ID: [id number]   Sex: Female   YOB: 1955    Primary Care Provider: Js Tavares DO   Referring Provider: Js Tavares DO    Visit Date: December 14, 2020    Provider: Js Tavares DO   Location: [facility]   Location Address: [address] [place] [address], [address] [place] [address]  [id number]   Location Phone: [phone]          History Of Present Illness  TELEHEALTH TELEPHONE VISIT  Leilani Ordaz is a 65 year old /White female who is presenting for evaluation via telehealth telephone visit. Verbal consent obtained before beginning visit.   Provider spent 8 minutes with patient during telehealth visit.   The following staff were present during this visit: provider only   Past Medical History/Overview of Patient Symptoms     Patient presents as a telehealth/telephone visit.  She is unaccompanied.  She provides verbal consent to visit.  I spent 8 minutes on medical discussion with patient.  She reports overall doing well.  She did have repeat labs done back in November.  We are following up on elevated alkaline phosphatase and LFTs which have normalized now.  Creatinine is 1.19 which reviewing her record she has baseline CKD stage III.  Her creatinine baseline is about 1.2.  Her A1c when recently checked back in September was 8.9%.  She is still taking Metformin.  We discussed continue current management and repeating her labs in 3 months.  Depending on what her creatinine does we may consider lowering her dose of Metformin however she has tolerated this dose.  Her mammogram is up-to-date and was BI-RADS 1 back on 9/10/2020.  Patient is not requesting any medications to be refilled at this time.           Assessment  · Diabetes mellitus, type 2     250.00/E11.9  · CKD (chronic kidney disease)     585.9/N18.9  · Medication monitoring encounter     V58.83/Z51.81      Plan  · Orders  o [facility] ([id number]) -  250.00/E11.9, V58.83/Z51.81 - 12/14/2020  o Hgb A1c [facility] (56480) - 250.00/E11.9, V58.83/Z51.81 - 12/14/2020  o Urinalysis with Reflex Microscopy ([facility]) (GASTR) - 250.00/E11.9, V58.83/Z51.81 - 12/14/2020  o ACO-39: Current medications updated and reviewed (1159F, ) - - 12/14/2020  o [facility] Telephone evaluation, 5-10 min (08004) - - 12/14/2020  o Albumin/creat ur test strip (62881, 32081) - - 12/14/2020  · Instructions  o Plan Of Care:   o Patient instructed/educated on their diet and exercise program.  o Plan as documented above. I will see patient back in 3 months or sooner if needed. She is instructed to call with any questions or concerns.  · Disposition  o Follow Up in 3 months.            Electronically Signed by: Js Tavares DO -Author on December 14, 2020 01:16:15 PM

## 2021-05-13 NOTE — PROGRESS NOTES
Progress Note      Patient Name: Leilani Ordaz   Patient ID: [id number]   Sex: Female   YOB: 1955    Primary Care Provider: Js Tavares DO   Referring Provider: Js Tavares DO    Visit Date: September 14, 2020    Provider: Js Tavares DO   Location: [facility]   Location Address: [address] [place] [address], [address] [place] [address]  [id number]   Location Phone: [phone]          Chief Complaint  · check up      History Of Present Illness  Leilani Ordaz is a 64 year old /White female who presents for evaluation and treatment of:      Patient presents today for checkup.  She reports overall doing well.  She is having back pain.  This has been previously evaluated.  The MRI of the lumbar spine done back in October 2018 revealed mild to moderate multilevel degenerative disc disease and facet arthrosis with mild to moderate multilevel central canal neuroforaminal narrowing.  She did an injection but it did not help.  She states that she cannot afford to have injections again.  She is not interested in any sedating medications.  I discussed with her lidocaine patches and she is agreeable to doing this.  She would also consider in the future physical therapy again to which she had some benefit.  Her depression screening today has been reviewed and it is negative.  She has hypertension.  Her blood pressure log previously showed normotensive results mixed with a few more elevated with systolic in the 140s and even 1 with systolic of 150.  Discussed with her continuing current management for now.  Her labs were reviewed.  Her A1c is unfortunately 8.9%.  Her kidney function has significantly improved down to normal range.  Unfortunately her alkaline phosphatase and LFTs are elevated.  These will need to be repeated.  She was recently switched from pravastatin 80 mg to atorvastatin 80 mg.  I will drop her down to 40 mg as I suspect this may be what caused the  elevation.  Her cholesterol levels look better so we will continue on the 40 but not the 80 and reassess her LFTs in 1 month.  Her mammogram was recently done and was BI-RADS 1 on 9/10/2020.       Past Medical History  Arthritis; Breast cancer screening; Broken Bones; Cataract; Cervical cancer screening; Colon cancer screening; Diabetes; Diverticulosis; Ganglion cyst; Hyperlipemia; Hyperlipidemia; Hypertension; Hypertension, unspecified type; Limb Swelling; Migraine; Night sweats; Reflux; Screening for breast cancer; Seasonal allergies; Seizure; Sinus trouble; Skin Disease; Type 2 diabetes mellitus with stage 3 chronic kidney disease, without long-term current use of insulin         Past Surgical History  Colonoscopy         Medication List  aspirin 81 mg oral tablet,delayed release (DR/EC); atorvastatin 80 mg oral tablet; Claritin 10 mg oral tablet; Fish Oil oral; lisinopril 20 mg oral tablet; metformin 1,000 mg oral tablet; Women's Multivitamin 18 mg iron-400 mcg-500 mg oral tablet         Allergy List  Caltrate 600 + D       Allergies Reconciled  Family Medical History  Heart Disease; Diabetes, unspecified type; - No Family History of Colorectal Cancer; Diabetes; Family history of certain chronic disabling diseases; arthritis         Social History  Alcohol; Alcohol Use (Current some day); lives with spouse; .; Moderate Amount of Exercise (1-3 times weekly); Recreational Drug Use (Never); Tobacco (Never); Working         Immunizations  Name Date Admin   Hepatitis A    Hepatitis A    Influenza Refused         Review of Systems     General: Denies any fever, chills, weight changes, or night sweats  HEENT:  Denies any vision or hearing changes. Denies any neck tenderness. Denies any headaches. Denies nasal congestion  Cardiovascular: Denies any chest pain or palpitations  respiratory: Denies any cough or wheezing. Denies any shortness of breath  Gastrointestinal: Denies any nausea vomiting or diarrhea, Denies  "constipation  Extremities: Denies any edema  Psychiatric: Denies any changes in mood or affect  Neurologic: She does have some numbness/burning/tingling down her legs but this is not as bothersome as the achy low back pain  skin: Denies any rashes or lesions.  endocrine: Denies any weight loss, fever, night sweats  Musculoskeletal: As discussed above       Vitals  Date Time BP Position Site L\R Cuff Size HR RR TEMP (F) WT  HT  BMI kg/m2 BSA m2 O2 Sat HC       09/14/2020 01:37 /72 Sitting    81 - R  97.3 185lbs 6oz 5'  4\" 31.82 1.95 99 %          Physical Examination     General: AAO 3, no acute distress, pleasant  HEENT: Normocephalic, atraumatic  Cardiovascular: Regular rate and rhythm without appreciable murmur  Respiratory: Clear to auscultation bilaterally no RRW  Gastrointestinal: Soft nontender nondistended with bowel sounds present  extremities: No clubbing, cyanosis or edema  Neurologic: CN II through XII grossly intact   Psychiatric: Normal mood and affect  Musculoskeletal: Negative straight leg raise bilaterally.  She does have paraspinal hypertonicity of lumbar spine with no tenderness to palpation.           Assessment  · Diabetes mellitus, type 2     250.00/E11.9  · Screening for depression     V79.0/Z13.89  · Need for influenza vaccination     V04.81/Z23  · Low back pain     724.2/M54.5  · DDD (degenerative disc disease), lumbar     722.52/M51.36  · Elevated LFTs     790.6/R79.89  · Elevated alkaline phosphatase level     790.5/R74.8  · Hypertension     401.9/I10  · HLD (hyperlipidemia)     272.4/E78.5      Plan  · Orders  o ACO-14: Influenza immunization was not administered for reasons documented () - V04.81/Z23 - 09/14/2020  o CBC with Auto Diff [facility] ([id number]) - 401.9/I10 - 10/14/2020  o CMP [facility] (02460) - 790.6/R79.89, 790.5/R74.8 - 10/14/2020  o ACO-39: Current medications updated and reviewed () - - 09/14/2020  o ACO-18: Negative screen for clinical depression using a standardized " tool () - V79.0/Z13.89 - 09/14/2020  · Medications  o lidocaine 5 % topical adhesive patch,medicated   SIG: apply 1 patch by transdermal route once daily (May wear up to 12hours.) for 30 days   DISP: (60) patches with 3 refills  Prescribed on 09/14/2020     o atorvastatin 80 mg oral tablet   SIG: take 1/2 tablet (40 mg) by oral route once daily for cholesterol   DISP: (45) tablets with 3 refills  Adjusted on 09/14/2020     o metformin 1,000 mg oral tablet   SIG: take 1 tablet PO 2 times per day with morning and evening meals for diabetes   DISP: (180) tablet with 3 refills  Adjusted on 09/14/2020     o Medications have been Reconciled  o Transition of Care or Provider Policy  · Instructions  o Depression Screen completed and scanned into the EMR under the designated folder within the patient's documents.  o Today's PHQ-9 result is __4_  o Patient was educated/instructed on their diagnosis, treatment and medications prior to discharge from the clinic today.  o Patient instructed to seek medical attention urgently for new or worsening symptoms.  o Call the office with any concerns or questions.  o I will increase her metformin to thousand milligrams twice a day. Creatinine is looking a lot better. We will reassess again in 1 month. Her A1c unfortunately shows that she is not at goal with her diabetes. We may need to make other adjustments including adding Januvia in the future. She is not interested in taking insulin.For her low back pain I will give her lidocaine patches. Patient will call with any questions or concerns. Atorvastatin has been scaled back to 40 mg once a day.  · Disposition  o Follow Up in 3 months.            Electronically Signed by: Js Tavares DO -Author on September 14, 2020 03:13:34 PM

## 2021-05-13 NOTE — PROGRESS NOTES
Progress Note      Patient Name: Leilani Ordaz   Patient ID: [id number]   Sex: Female   YOB: 1955    Primary Care Provider: Js Tavares DO   Referring Provider: Js Tavares DO    Visit Date: Anaya 15, 2020    Provider: Js Tavares DO   Location: [facility]   Location Address: [address] [place] [address], [address]  [id number]   Location Phone: [phone]          Chief Complaint  · check up      History Of Present Illness  Leilani Ordaz is a 64 year old /White female who presents for evaluation and treatment of:      Patient presents today for checkup.  Her labs were reviewed.  A1c has increased from 6.0% to 8.3%.  She does have a component of underlying chronic kidney disease.  Her creatinine is around 1.2.  It is 1.24 on her labs from 6/11/2020.  Her lipid panel shows that her LDL while that has improved is not at goal.  I will switch her to atorvastatin given her history of diabetes.  I encouraged her to continue to diet and exercise to improve her A1c as well as triglyceride level and improve her HDL.  Patient did keep track of her blood pressure.  She has some levels that are elevated in the 140s and several that are in the 120s and 110s.  Overall her blood pressure looks controlled.  Discussed continuing current management.  Discussed repeating her CMP as well as A1c prior to her next appointment in 3 months.       Past Medical History  Arthritis; Breast cancer screening; Broken Bones; Cataract; Cervical cancer screening; Colon cancer screening; Diabetes; Diverticulosis; Ganglion cyst; Hyperlipemia; Hyperlipidemia; Hypertension; Hypertension, unspecified type; Limb Swelling; Migraine; Night sweats; Reflux; Screening for breast cancer; Seasonal allergies; Seizure; Sinus trouble; Skin Disease; Type 2 diabetes mellitus with stage 3 chronic kidney disease, without long-term current use of insulin         Past Surgical History  Colonoscopy         Medication  "List  aspirin 81 mg oral tablet,delayed release (DR/EC); Claritin 10 mg oral tablet; Fish Oil oral; lisinopril 20 mg oral tablet; metformin 1,000 mg oral tablet; Women's Multivitamin 18 mg iron-400 mcg-500 mg oral tablet         Allergy List  Caltrate 600 + D       Allergies Reconciled  Family Medical History  Heart Disease; Diabetes, unspecified type; - No Family History of Colorectal Cancer; Diabetes; Family history of certain chronic disabling diseases; arthritis         Social History  Alcohol; Alcohol Use (Current some day); lives with spouse; .; Moderate Amount of Exercise (1-3 times weekly); Recreational Drug Use (Never); Tobacco (Never); Working         Immunizations  Name Date Admin   Hepatitis A    Hepatitis A          Review of Systems     General: Denies any fever, chills.  Patient reports weight gain which she has been eating more food lately.  HEENT:  Denies any vision or hearing changes. Denies any neck tenderness. Denies any headaches. Denies nasal congestion  Cardiovascular: Denies any chest pain or palpitations  respiratory: Denies any cough or wheezing. Denies any shortness of breath  Gastrointestinal: Denies any nausea vomiting or diarrhea, Denies constipation  Extremities: Denies any edema  Psychiatric: Denies any changes in mood or affect  Neurologic: Denies any neurologic deficits  skin: Denies any rashes or lesions.  endocrine: Denies any weight loss, fever, night sweats  Musculoskeletal: Denies any weakness       Vitals  Date Time BP Position Site L\R Cuff Size HR RR TEMP (F) WT  HT  BMI kg/m2 BSA m2 O2 Sat        06/15/2020 03:19 /74 Sitting    63 - R  97.5 201lbs 6oz 5'  4\" 34.57 2.03 99 %          Physical Examination     General: AAO 3, no acute distress, pleasant  HEENT: Normocephalic, atraumatic  Cardiovascular: Regular rate and rhythm without appreciable murmur  Respiratory: Clear to auscultation bilaterally no RRW  Gastrointestinal: Soft nontender nondistended with " bowel sounds present  extremities: No clubbing, cyanosis or edema  Neurologic: CN II through XII grossly intact   Psychiatric: Normal mood and affect           Assessment  · HLD (hyperlipidemia)     272.4/E78.5  · Uncontrolled diabetes mellitus     250.02/E11.65  · Chronic kidney disease     585.9/N18.9    Problems Reconciled  Plan  · Orders  o CMP [facility] ([id number]) - 585.9/N18.9 - 09/15/2020  o Hgb A1c [facility] ([id number]) - 250.02/E11.65 - 09/15/2020  o Lipid Panel [facility] ([id number]) - 272.4/E78.5 - 09/15/2020  o ACO-39: Current medications updated and reviewed () - - 06/15/2020  o ACO-19: Colorectal cancer screening results documented and reviewed (3017F) - - 06/15/2020  · Medications  o atorvastatin 80 mg oral tablet   SIG: take 1 tablet (80 mg) by oral route once daily for cholesterol   DISP: (90) tablets with 3 refills  Prescribed on 06/15/2020     o metformin 1,000 mg oral tablet   SIG: take 1/2 tablet PO 2 times per day with morning and evening meals for diabetes   DISP: (180) tablet with 3 refills  Adjusted on 06/15/2020     o pravastatin 80 mg oral tablet   SIG: take 1 tablet (80 mg) by oral route once daily for 30 days   DISP: (30) tablet with 0 refills  Discontinued on 06/15/2020     o Medications have been Reconciled  o Transition of Care or Provider Policy  · Instructions  o Patient was educated/instructed on their diagnosis, treatment and medications prior to discharge from the clinic today.  o Patient instructed to seek medical attention urgently for new or worsening symptoms.  o Call the office with any concerns or questions.  o Plan as documented above. Patient to call with any questions or concerns. Plan on seeing her back in 3 months or sooner if needed. I will increase her metformin to 500 mg twice daily. She is instructed to check with her pharmacy to ensure that her metformin has not been recalled as this recall has been for extended release metformin which she is not taking.  · Disposition  o Follow Up in 3  months.            Electronically Signed by: Js Tavares DO -Author on Anaya 15, 2020 05:47:38 PM

## 2021-05-14 VITALS
WEIGHT: 177.5 LBS | OXYGEN SATURATION: 99 % | HEIGHT: 64 IN | BODY MASS INDEX: 30.3 KG/M2 | TEMPERATURE: 98.9 F | HEART RATE: 68 BPM | SYSTOLIC BLOOD PRESSURE: 124 MMHG | DIASTOLIC BLOOD PRESSURE: 58 MMHG

## 2021-05-14 VITALS
WEIGHT: 185.37 LBS | OXYGEN SATURATION: 99 % | DIASTOLIC BLOOD PRESSURE: 72 MMHG | BODY MASS INDEX: 31.65 KG/M2 | HEIGHT: 64 IN | HEART RATE: 81 BPM | TEMPERATURE: 97.3 F | SYSTOLIC BLOOD PRESSURE: 154 MMHG

## 2021-05-14 VITALS — HEART RATE: 76 BPM | WEIGHT: 180 LBS | OXYGEN SATURATION: 96 % | HEIGHT: 64 IN | BODY MASS INDEX: 30.73 KG/M2

## 2021-05-14 VITALS — HEART RATE: 79 BPM | HEIGHT: 64 IN | OXYGEN SATURATION: 95 % | WEIGHT: 180 LBS | BODY MASS INDEX: 30.73 KG/M2

## 2021-05-14 NOTE — PROGRESS NOTES
Progress Note      Patient Name: Leilani Ordaz   Patient ID: [id number]   Sex: Female   YOB: 1955    Primary Care Provider: Js Tavares DO   Referring Provider: Js Tavares DO    Visit Date: February 10, 2021    Provider: Onelia Irwin PA-C   Location: [facility]   Location Address: [address] [place] [address]  [id number]   Location Phone: [phone]          Chief Complaint  · Follow up left foot pain      History Of Present Illness  Leilani Ordaz is a 65 year old /White female who presents today to [facility].      She is here for follow up of left middle toe injury sustained on 1/16/21, when she kicked a chair inadvertently. She states pain improved and she has transitioned back to normal shoe.             Past Medical History  Arthritis; Breast cancer screening; Broken Bones; Cataract; Cervical cancer screening; Colon cancer screening; Diabetes; Diverticulosis; Ganglion cyst; Hyperlipemia; Hyperlipidemia; Hypertension; Hypertension, unspecified type; Limb Swelling; Migraine; Night sweats; Reflux; Screening for breast cancer; Seasonal allergies; Seizure; Sinus trouble; Skin Disease; Type 2 diabetes mellitus with stage 3 chronic kidney disease, without long-term current use of insulin         Past Surgical History  Colonoscopy         Medication List  aspirin 81 mg oral tablet,delayed release (DR/EC); atorvastatin 80 mg oral tablet; Claritin 10 mg oral tablet; Fish Oil oral; lidocaine 5 % topical adhesive patch,medicated; lisinopril 20 mg oral tablet; metformin 1,000 mg oral tablet; Women's Multivitamin 18 mg iron-400 mcg-500 mg oral tablet         Allergy List  Caltrate 600 + D       Allergies Reconciled  Family Medical History  Heart Disease; Diabetes, unspecified type; - No Family History of Colorectal Cancer; Diabetes; Family history of certain chronic disabling diseases; arthritis; Family history of Arthritis         Social  "History  Alcohol; Alcohol Use (Current some day); lives with spouse; .; Moderate Amount of Exercise (1-3 times weekly); Recreational Drug Use (Former); Tobacco (Never); Working         Review of Systems  · Constitutional  o Denies  o : fever, chills, weight loss  · Cardiovascular  o Denies  o : chest pain, shortness of breath  · Gastrointestinal  o Denies  o : liver disease, heartburn, nausea, blood in stools  · Genitourinary  o Denies  o : painful urination, blood in urine  · Integument  o Denies  o : rash, itching  · Neurologic  o Denies  o : headache, weakness, loss of consciousness  · Musculoskeletal  o Denies  o : painful, swollen joints  · Psychiatric  o Denies  o : drug/alcohol addiction, anxiety, depression      Vitals  Date Time BP Position Site L\R Cuff Size HR RR TEMP (F) WT  HT  BMI kg/m2 BSA m2 O2 Sat FR L/min FiO2 HC       02/10/2021 11:17 AM      76 - R   180lbs 0oz 5'  4\" 30.9 1.92 96 %            Physical Examination  · Constitutional  o Appearance  o : well developed, well-nourished, no obvious deformities present  · Head and Face  o Head  o :   § Inspection  § : normocephalic  o Face  o :   § Inspection  § : no facial lesions  · Eyes  o Conjunctivae  o : conjunctivae normal  o Sclerae  o : sclerae white  · Ears, Nose, Mouth and Throat  o Ears  o :   § External Ears  § : appearance within normal limits  § Hearing  § : intact  o Nose  o :   § External Nose  § : appearance normal  · Neck  o Inspection/Palpation  o : normal appearance  o Range of Motion  o : full range of motion  · Respiratory  o Respiratory Effort  o : breathing unlabored  o Inspection of Chest  o : normal appearance  o Auscultation of Lungs  o : no audible wheezing or rales  · Cardiovascular  o Heart  o : regular rate  · Gastrointestinal  o Abdominal Examination  o : soft and non-tender  · Skin and Subcutaneous Tissue  o General Inspection  o : intact, no rashes  · Psychiatric  o General  o : Alert and oriented " x3  o Judgement and Insight  o : judgment and insight intact  o Mood and Affect  o : mood normal, affect appropriate  · Left Ankle/Foot  o Inspection  o : Normal appearing. Nontender. Full weight bearing. Neurovascularly intact.  · In Office Procedures  o View  o : AP/LATERAL  o Site  o : left, foot   o Indication  o : Left Foot Pain   o Study  o : X-rays ordered, taken in the office, and reviewed today.  o Xray  o : Stable fracture of proximal middle toe phalanx, early healing  o Comparative Data  o : Comparative Data found and reviewed today           Assessment  · Left foot pain     729.5/M79.672      Plan  · Orders  o Foot (Left) AP/Lat X-Ray ([id number]) - 729.5/M79.672 - 02/10/2021  · Medications  o Medications have been Reconciled  o Transition of Care or Provider Policy  · Instructions  o Reviewed the patient's Past Medical, Social, and Family history as well as the ROS at today's visit, no changes.  o Call or return if worsening symptoms.  o Progress activity as tolerated. Follow up PRN.  o Electronically Identified Patient Education Materials Provided Electronically            Electronically Signed by: KARUNA HallC -Author on February 10, 2021 01:06:29 PM

## 2021-05-14 NOTE — PROGRESS NOTES
Progress Note      Patient Name: Leilani Ordaz   Patient ID: [id number]   Sex: Female   YOB: 1955    Primary Care Provider: Js Tavares DO   Referring Provider: Js Tavares DO    Visit Date: March 15, 2021    Provider: Js Tavares DO   Location: [facility]   Location Address: [address] [place] [address], [address] [place] [address]  [id number]   Location Phone: [phone]          Chief Complaint  · 3 mo       History Of Present Illness  Leilani Ordaz is a 65 year old /White female who presents for evaluation and treatment of:      Patient presents today for checkup.  Since last time I saw her she did have a fracture that was nondisplaced of the proximal phalanx of the third digit.  She went to see orthopedics and reports that the pain and symptoms resolved with no issues.  She does have degenerative changes around the first metatarsal which is noted on x-ray that might relate to gout.  Discussed checking uric acid level today.  She denies any history of gout flare.  There is also a benign area of lucency seen within the proximal phalanx of the fourth digit.  She denies any pain in this area.  She does have a bump on her left forearm that has been present for the past few months.  Denies any pain.  Reports that it moves.  She is due for labs.  She reports doing well otherwise.  She declines flu vaccine today.  She did receive the Covid vaccinations.       Past Medical History  Arthritis; Breast cancer screening; Broken Bones; Cataract; Cervical cancer screening; Colon cancer screening; Diabetes; Diverticulosis; Ganglion cyst; Hyperlipemia; Hyperlipidemia; Hypertension; Hypertension, unspecified type; Limb Swelling; Migraine; Night sweats; Reflux; Screening for breast cancer; Seasonal allergies; Seizure; Sinus trouble; Skin Disease; Type 2 diabetes mellitus with stage 3 chronic kidney disease, without long-term current use of insulin         Past Surgical  "History  Colonoscopy         Medication List  aspirin 81 mg oral tablet,delayed release (DR/EC); atorvastatin 80 mg oral tablet; Claritin 10 mg oral tablet; Fish Oil oral; lisinopril 20 mg oral tablet; metformin 1,000 mg oral tablet; Women's Multivitamin 18 mg iron-400 mcg-500 mg oral tablet         Allergy List  Caltrate 600 + D       Allergies Reconciled  Family Medical History  Heart Disease; Diabetes, unspecified type; - No Family History of Colorectal Cancer; Diabetes; Family history of certain chronic disabling diseases; arthritis; Family history of Arthritis         Social History  Alcohol; Alcohol Use (Current some day); lives with spouse; .; Moderate Amount of Exercise (1-3 times weekly); Recreational Drug Use (Former); Tobacco (Never); Working         Immunizations  Name Date Admin   COVID Moderna 02/22/2021   Hepatitis A 07/16/2019   Hepatitis A 12/18/2018   Influenza Refused         Review of Systems     Gen: Denies any fever, chills, or weight changes  HEENT: Denies any changes in vision or hearing, denies nasal congestion, denies any neck tenderness or lymphadenopathy  Extremities: Denies edema  Psychiatric: Denies any changes in mood or affect  Neurologic: Denies any deficits  Skin: As discussed above       Vitals  Date Time BP Position Site L\R Cuff Size HR RR TEMP (F) WT  HT  BMI kg/m2 BSA m2 O2 Sat FR L/min FiO2 HC       03/15/2021 01:12 /58 Sitting    68 - R  98.9 177lbs 8oz 5'  4\" 30.47 1.91 99 %            Physical Examination     General: AAO 3, no acute distress, pleasant  HEENT: Normocephalic, atraumatic  Cardiovascular: Regular rate and rhythm without appreciable murmur  Respiratory: Clear to auscultation bilaterally no RRW  Gastrointestinal: Soft nontender nondistended with bowel sounds present  Skin: Mobile lesion noted on her left forearm that measures about 5 mm x 5 mm.  No central punctum.  Appears consistent with a lipoma  Musculoskeletal: No deformity appreciated in the " left foot.  The third and fourth digits have full range of motion without tenderness.  There is no ecchymosis.  extremities: No edema  Neurologic: CN II through XII grossly intact   Psychiatric: Normal mood and affect           Assessment  · Lipoma     214.9/D17.9  · Gout     274.9/M10.9  · Toe fracture, left     826.0/S92.912A  · Hypertension     401.9/I10  Discussed getting labs. I will see patient back in 6 months for checkup. I suspect she has a lipoma on her forearm. It is small and is not causing any issues at this time. If she has any worsening symptoms I instructed her to call or return. She has resolution of pain from her third digit proximal phalanx fracture.      Plan  · Orders  o ACO-14: Influenza immunization was not administered for reasons documented [facility] () - - 03/15/2021   declines  o ACO-39: Current medications updated and reviewed (, 1159F) - - 03/15/2021  o Uric Acid Serum [facility] ([id number]) - 274.9/M10.9 - 03/15/2021  · Medications  o Medications have been Reconciled  o Transition of Care or Provider Policy  · Instructions  o Patient was educated/instructed on their diagnosis, treatment and medications prior to discharge from the clinic today.  o Patient instructed to seek medical attention urgently for new or worsening symptoms.  o Call the office with any concerns or questions.  · Disposition  o Follow Up in 6 months.            Electronically Signed by: Js Tavares DO -Author on March 15, 2021 02:21:13 PM

## 2021-05-15 VITALS
BODY MASS INDEX: 31.44 KG/M2 | TEMPERATURE: 98.3 F | HEIGHT: 64 IN | WEIGHT: 184.12 LBS | OXYGEN SATURATION: 99 % | SYSTOLIC BLOOD PRESSURE: 144 MMHG | HEART RATE: 70 BPM | DIASTOLIC BLOOD PRESSURE: 68 MMHG

## 2021-05-15 VITALS
WEIGHT: 201.37 LBS | BODY MASS INDEX: 34.38 KG/M2 | HEART RATE: 63 BPM | DIASTOLIC BLOOD PRESSURE: 74 MMHG | TEMPERATURE: 97.5 F | HEIGHT: 64 IN | SYSTOLIC BLOOD PRESSURE: 132 MMHG | OXYGEN SATURATION: 99 %

## 2021-05-15 VITALS
SYSTOLIC BLOOD PRESSURE: 154 MMHG | OXYGEN SATURATION: 98 % | WEIGHT: 174.25 LBS | HEIGHT: 64 IN | HEART RATE: 87 BPM | DIASTOLIC BLOOD PRESSURE: 76 MMHG | TEMPERATURE: 99.1 F | BODY MASS INDEX: 29.75 KG/M2

## 2021-05-15 VITALS
HEIGHT: 63 IN | WEIGHT: 174.12 LBS | BODY MASS INDEX: 30.85 KG/M2 | DIASTOLIC BLOOD PRESSURE: 64 MMHG | HEART RATE: 92 BPM | SYSTOLIC BLOOD PRESSURE: 164 MMHG | TEMPERATURE: 98.5 F | OXYGEN SATURATION: 99 %

## 2021-05-15 VITALS
OXYGEN SATURATION: 99 % | WEIGHT: 187 LBS | HEIGHT: 64 IN | RESPIRATION RATE: 14 BRPM | HEART RATE: 88 BPM | SYSTOLIC BLOOD PRESSURE: 138 MMHG | TEMPERATURE: 98.3 F | DIASTOLIC BLOOD PRESSURE: 78 MMHG | BODY MASS INDEX: 31.92 KG/M2

## 2021-05-15 VITALS
OXYGEN SATURATION: 98 % | HEART RATE: 93 BPM | SYSTOLIC BLOOD PRESSURE: 152 MMHG | BODY MASS INDEX: 30.96 KG/M2 | TEMPERATURE: 98.3 F | HEIGHT: 64 IN | WEIGHT: 181.37 LBS | DIASTOLIC BLOOD PRESSURE: 74 MMHG

## 2021-05-16 VITALS
OXYGEN SATURATION: 99 % | HEART RATE: 68 BPM | DIASTOLIC BLOOD PRESSURE: 70 MMHG | HEIGHT: 64 IN | TEMPERATURE: 98.6 F | WEIGHT: 180.37 LBS | BODY MASS INDEX: 30.79 KG/M2 | SYSTOLIC BLOOD PRESSURE: 134 MMHG

## 2021-05-16 VITALS — HEART RATE: 76 BPM | BODY MASS INDEX: 30.73 KG/M2 | WEIGHT: 180 LBS | OXYGEN SATURATION: 97 % | HEIGHT: 64 IN

## 2021-05-16 VITALS
HEIGHT: 64 IN | HEART RATE: 71 BPM | TEMPERATURE: 98.7 F | SYSTOLIC BLOOD PRESSURE: 168 MMHG | BODY MASS INDEX: 30.14 KG/M2 | DIASTOLIC BLOOD PRESSURE: 80 MMHG | OXYGEN SATURATION: 98 % | WEIGHT: 176.56 LBS

## 2021-05-16 VITALS
WEIGHT: 180.12 LBS | TEMPERATURE: 98 F | DIASTOLIC BLOOD PRESSURE: 76 MMHG | SYSTOLIC BLOOD PRESSURE: 132 MMHG | HEART RATE: 66 BPM | HEIGHT: 64 IN | OXYGEN SATURATION: 98 % | BODY MASS INDEX: 30.75 KG/M2

## 2021-05-16 VITALS
SYSTOLIC BLOOD PRESSURE: 142 MMHG | HEART RATE: 87 BPM | OXYGEN SATURATION: 98 % | BODY MASS INDEX: 31.07 KG/M2 | DIASTOLIC BLOOD PRESSURE: 70 MMHG | WEIGHT: 182 LBS | TEMPERATURE: 98.6 F | HEIGHT: 64 IN

## 2021-05-16 VITALS
WEIGHT: 180.12 LBS | HEIGHT: 64 IN | OXYGEN SATURATION: 99 % | HEART RATE: 76 BPM | TEMPERATURE: 98.5 F | SYSTOLIC BLOOD PRESSURE: 134 MMHG | BODY MASS INDEX: 30.75 KG/M2 | DIASTOLIC BLOOD PRESSURE: 68 MMHG

## 2021-05-16 VITALS
WEIGHT: 178.25 LBS | DIASTOLIC BLOOD PRESSURE: 72 MMHG | SYSTOLIC BLOOD PRESSURE: 118 MMHG | TEMPERATURE: 98.2 F | HEIGHT: 64 IN | HEART RATE: 74 BPM | BODY MASS INDEX: 30.43 KG/M2 | OXYGEN SATURATION: 100 %

## 2021-05-16 VITALS — HEART RATE: 72 BPM | BODY MASS INDEX: 30.73 KG/M2 | OXYGEN SATURATION: 98 % | HEIGHT: 64 IN | WEIGHT: 180 LBS

## 2021-08-19 ENCOUNTER — OFFICE VISIT (OUTPATIENT)
Dept: FAMILY MEDICINE CLINIC | Facility: CLINIC | Age: 66
End: 2021-08-19

## 2021-08-19 VITALS
SYSTOLIC BLOOD PRESSURE: 140 MMHG | HEART RATE: 80 BPM | WEIGHT: 180.6 LBS | DIASTOLIC BLOOD PRESSURE: 82 MMHG | OXYGEN SATURATION: 98 % | BODY MASS INDEX: 30.83 KG/M2 | HEIGHT: 64 IN | TEMPERATURE: 97 F

## 2021-08-19 DIAGNOSIS — I10 ESSENTIAL HYPERTENSION: Primary | ICD-10-CM

## 2021-08-19 DIAGNOSIS — E78.2 MIXED HYPERLIPIDEMIA: ICD-10-CM

## 2021-08-19 DIAGNOSIS — N18.31 TYPE 2 DIABETES MELLITUS WITH STAGE 3A CHRONIC KIDNEY DISEASE, WITHOUT LONG-TERM CURRENT USE OF INSULIN (HCC): ICD-10-CM

## 2021-08-19 DIAGNOSIS — E11.22 TYPE 2 DIABETES MELLITUS WITH STAGE 3A CHRONIC KIDNEY DISEASE, WITHOUT LONG-TERM CURRENT USE OF INSULIN (HCC): ICD-10-CM

## 2021-08-19 DIAGNOSIS — G89.29 CHRONIC BILATERAL LOW BACK PAIN WITH RIGHT-SIDED SCIATICA: ICD-10-CM

## 2021-08-19 DIAGNOSIS — M54.41 CHRONIC BILATERAL LOW BACK PAIN WITH RIGHT-SIDED SCIATICA: ICD-10-CM

## 2021-08-19 DIAGNOSIS — M79.671 FOOT PAIN, RIGHT: ICD-10-CM

## 2021-08-19 PROBLEM — M67.40 GANGLION CYST: Status: ACTIVE | Noted: 2018-12-18

## 2021-08-19 PROBLEM — R56.9 SEIZURE (HCC): Status: ACTIVE | Noted: 2021-08-19

## 2021-08-19 PROBLEM — K21.9 ESOPHAGEAL REFLUX: Status: ACTIVE | Noted: 2021-08-19

## 2021-08-19 PROBLEM — M19.90 ARTHRITIS: Status: ACTIVE | Noted: 2021-08-19

## 2021-08-19 PROBLEM — H26.9 CATARACT: Status: ACTIVE | Noted: 2021-08-19

## 2021-08-19 PROBLEM — E78.5 HYPERLIPIDEMIA: Status: ACTIVE | Noted: 2021-08-19

## 2021-08-19 PROBLEM — J30.2 SEASONAL ALLERGIC RHINITIS: Status: ACTIVE | Noted: 2021-08-19

## 2021-08-19 PROBLEM — N18.30 TYPE 2 DIABETES MELLITUS WITH STAGE 3 CHRONIC KIDNEY DISEASE, WITHOUT LONG-TERM CURRENT USE OF INSULIN: Status: ACTIVE | Noted: 2018-12-18

## 2021-08-19 PROBLEM — G43.909 MIGRAINE: Status: ACTIVE | Noted: 2021-08-19

## 2021-08-19 PROBLEM — K57.90 DIVERTICULOSIS: Status: ACTIVE | Noted: 2021-08-19

## 2021-08-19 LAB — ALBUMIN UR-MCNC: <1.2 MG/DL

## 2021-08-19 PROCEDURE — 87086 URINE CULTURE/COLONY COUNT: CPT | Performed by: NURSE PRACTITIONER

## 2021-08-19 PROCEDURE — 81001 URINALYSIS AUTO W/SCOPE: CPT | Performed by: NURSE PRACTITIONER

## 2021-08-19 PROCEDURE — 84443 ASSAY THYROID STIM HORMONE: CPT | Performed by: NURSE PRACTITIONER

## 2021-08-19 PROCEDURE — 85025 COMPLETE CBC W/AUTO DIFF WBC: CPT | Performed by: NURSE PRACTITIONER

## 2021-08-19 PROCEDURE — 82043 UR ALBUMIN QUANTITATIVE: CPT | Performed by: NURSE PRACTITIONER

## 2021-08-19 PROCEDURE — 80061 LIPID PANEL: CPT | Performed by: NURSE PRACTITIONER

## 2021-08-19 PROCEDURE — 80053 COMPREHEN METABOLIC PANEL: CPT | Performed by: NURSE PRACTITIONER

## 2021-08-19 PROCEDURE — 83036 HEMOGLOBIN GLYCOSYLATED A1C: CPT | Performed by: NURSE PRACTITIONER

## 2021-08-19 PROCEDURE — 99214 OFFICE O/P EST MOD 30 MIN: CPT | Performed by: NURSE PRACTITIONER

## 2021-08-19 RX ORDER — ASPIRIN 81 MG/1
81 TABLET, COATED ORAL DAILY
COMMUNITY
Start: 2021-05-20 | End: 2021-11-02 | Stop reason: SDUPTHER

## 2021-08-19 RX ORDER — ATORVASTATIN CALCIUM 80 MG/1
80 TABLET, FILM COATED ORAL DAILY
COMMUNITY
Start: 2021-05-22 | End: 2021-11-02 | Stop reason: SDUPTHER

## 2021-08-19 RX ORDER — LISINOPRIL 20 MG/1
20 TABLET ORAL DAILY
COMMUNITY
Start: 2021-05-20 | End: 2021-11-02 | Stop reason: SDUPTHER

## 2021-08-19 RX ORDER — HYDROCHLOROTHIAZIDE 12.5 MG/1
12.5 TABLET ORAL DAILY
Qty: 90 TABLET | Refills: 1 | Status: SHIPPED | OUTPATIENT
Start: 2021-08-19 | End: 2022-04-26

## 2021-08-19 NOTE — PROGRESS NOTES
Chief Complaint  Follow-up and Hypertension, back pain    Subjective          Leilani Ordaz presents to [facility]   Presents today for an acute visit. She recently had a nurse from her insurance come and visit and recommend her to be seen prior to follow up appointment with Dr. Heath in Sept. She has a history of hypertension and low back pain. She reports she is under a lot of stress from work. She has a history of type 2 diabetes. She has not been checking her blood sugars. She states she has been stress eating and has gained a couple of pounds    Hypertension  This is a chronic problem. Condition status: fairly controlled. BP log some half the BP reading > 130/70. Pertinent negatives include no blurred vision. Risk factors for coronary artery disease include diabetes mellitus, obesity and dyslipidemia. Current antihypertension treatment includes ACE inhibitors.   Back Pain  This is a chronic problem. The problem occurs intermittently. The problem has been waxing and waning since onset. The pain is present in the lumbar spine. The pain radiates to the right knee and right thigh. The pain is at a severity of 7/10. The pain is moderate. The pain is the same all the time. The symptoms are aggravated by bending, sitting, twisting and standing. Associated symptoms include numbness. Pertinent negatives include no bladder incontinence, bowel incontinence, weakness or weight loss. Risk factors include menopause and obesity. Treatments tried: tylenol. The treatment provided mild relief.   Diabetes  She presents for her follow-up diabetic visit. She has type 2 diabetes mellitus. There are no hypoglycemic associated symptoms. Associated symptoms include foot paresthesias, polyphagia and visual change. Pertinent negatives for diabetes include no blurred vision, no fatigue, no foot ulcerations, no polydipsia, no polyuria, no weakness and no weight loss. There are no hypoglycemic  "complications. Diabetic complications include nephropathy. Risk factors for coronary artery disease include dyslipidemia, diabetes mellitus, hypertension and stress. Current diabetic treatment includes oral agent (monotherapy). She is compliant with treatment all of the time. She is following a generally healthy diet. When asked about meal planning, she reported none. She rarely participates in exercise. An ACE inhibitor/angiotensin II receptor blocker is being taken. Eye exam is current.     She went to the ER on 4/26/2021 for lower extremity pain, palpiations. EKG normal, CXR normal, D dimer negative. She was diagnosed with myofascial pain syndrome.    She also reports breaking her middle toe on her right foot a couple months ago. She kicked a chair. She had imaging done. She is complaining of pain in her right foot that has not improved in the top of the foot. Her pain in her toe has improved.    Social History     Socioeconomic History   • Marital status:      Spouse name: Not on file   • Number of children: Not on file   • Years of education: Not on file   • Highest education level: Not on file   Tobacco Use   • Smoking status: Never Smoker   • Smokeless tobacco: Never Used   Substance and Sexual Activity   • Alcohol use: Yes     Comment: RARELY   • Drug use: Not Currently       Objective   Vital Signs:   /82   Pulse 80   Temp 97 °F (36.1 °C)   Ht 162.6 cm (64\")   Wt 81.9 kg (180 lb 9.6 oz)   SpO2 98%   BMI 31.00 kg/m²     Physical Exam  Vitals reviewed.   Constitutional:       Appearance: Normal appearance. She is well-developed.   HENT:      Head: Normocephalic and atraumatic.      Right Ear: External ear normal.      Left Ear: External ear normal.      Mouth/Throat:      Pharynx: No oropharyngeal exudate.   Eyes:      Conjunctiva/sclera: Conjunctivae normal.      Pupils: Pupils are equal, round, and reactive to light.   Cardiovascular:      Rate and Rhythm: Normal rate and regular rhythm. "      Heart sounds: No murmur heard.   No friction rub. No gallop.    Pulmonary:      Effort: Pulmonary effort is normal.      Breath sounds: Normal breath sounds. No wheezing or rhonchi.   Abdominal:      General: Bowel sounds are normal. There is no distension.      Palpations: Abdomen is soft.      Tenderness: There is no abdominal tenderness.   Musculoskeletal:      Lumbar back: Tenderness present. No swelling, edema, deformity, signs of trauma, lacerations, spasms or bony tenderness. Normal range of motion. No scoliosis.   Skin:     General: Skin is warm and dry.   Neurological:      Mental Status: She is alert and oriented to person, place, and time.      Cranial Nerves: No cranial nerve deficit.   Psychiatric:         Mood and Affect: Mood and affect normal.         Behavior: Behavior normal.         Thought Content: Thought content normal.         Judgment: Judgment normal.        Result Review :     Common labs    Common Labsle 11/19/20 11/19/20 3/15/21 3/15/21 3/15/21 4/26/21 4/26/21    0811 0811 1400 1400 1400 1200 1200   Glucose  155 (A)   127 (A)  185 (A)   BUN  14   13  17   Creatinine  1.19 (A)   0.99 (A)  0.98 (A)   Sodium  137   137  138   Potassium  4.8   4.2  3.9   Chloride  103   100  102   Calcium  10.0   9.8  9.6   Albumin  3.8   4.6  4.5   Total Bilirubin  0.38   0.38  0.60   Alkaline Phosphatase  102   81  88   AST (SGOT)  36   22  20   ALT (SGPT)  36   24  24   WBC 5.53     8.90    Hemoglobin 12.1     13.4    Hematocrit 39.0     39.5    Platelets 201     272    Hemoglobin A1C   7.8 (A)       Uric Acid    5.5      (A) Abnormal value       Comments are available for some flowsheets but are not being displayed.                     Assessment and Plan    Diagnoses and all orders for this visit:    1. Essential hypertension (Primary)  -     Comprehensive Metabolic Panel  -     Lipid Panel  -     TSH Rfx On Abnormal To Free T4  -     hydroCHLOROthiazide (HYDRODIURIL) 12.5 MG tablet; Take 1 tablet  by mouth Daily.  Dispense: 90 tablet; Refill: 1    2. Type 2 diabetes mellitus with stage 3a chronic kidney disease, without long-term current use of insulin (CMS/[facility])  -     Urinalysis With Culture If Indicated -  -     CBC & Differential  -     Comprehensive Metabolic Panel  -     Hemoglobin A1c  -     MicroAlbumin, Urine, Random - Urine, Clean Catch    3. Mixed hyperlipidemia  -     Lipid Panel    4. Chronic bilateral low back pain with right-sided sciatica  -     XR Spine Lumbar Complete 4+VW; Future    5. Foot pain, right  -     Ambulatory Referral to Podiatry        Follow Up   Return in about 4 weeks (around 9/16/2021), or if symptoms worsen or fail to improve, for Next scheduled follow up with Dr. Tavares.  Patient was given instructions and counseling regarding her condition or for health maintenance advice. Please see specific information pulled into the AVS if appropriate.

## 2021-08-19 NOTE — PATIENT INSTRUCTIONS
Diabetes Basics    Diabetes (diabetes mellitus) is a long-term (chronic) disease. It occurs when the body does not properly use sugar (glucose) that is released from food after you eat.  Diabetes may be caused by one or both of these problems:  · Your pancreas does not make enough of a hormone called insulin.  · Your body does not react in a normal way to insulin that it makes.  Insulin lets sugars (glucose) go into cells in your body. This gives you energy. If you have diabetes, sugars cannot get into cells. This causes high blood sugar (hyperglycemia).  Follow these instructions at home:  How is diabetes treated?  You may need to take insulin or other diabetes medicines daily to keep your blood sugar in balance. Take your diabetes medicines every day as told by your doctor. List your diabetes medicines here:  Diabetes medicines  · Name of medicine: ______________________________  ? Amount (dose): _______________ Time (a.m./p.m.): _______________ Notes: ___________________________________  · Name of medicine: ______________________________  ? Amount (dose): _______________ Time (a.m./p.m.): _______________ Notes: ___________________________________  · Name of medicine: ______________________________  ? Amount (dose): _______________ Time (a.m./p.m.): _______________ Notes: ___________________________________  If you use insulin, you will learn how to give yourself insulin by injection. You may need to adjust the amount based on the food that you eat. List the types of insulin you use here:  Insulin  · Insulin type: ______________________________  ? Amount (dose): _______________ Time (a.m./p.m.): _______________ Notes: ___________________________________  · Insulin type: ______________________________  ? Amount (dose): _______________ Time (a.m./p.m.): _______________ Notes: ___________________________________  · Insulin type: ______________________________  ? Amount (dose): _______________ Time (a.m./p.m.):  _______________ Notes: ___________________________________  · Insulin type: ______________________________  ? Amount (dose): _______________ Time (a.m./p.m.): _______________ Notes: ___________________________________  · Insulin type: ______________________________  ? Amount (dose): _______________ Time (a.m./p.m.): _______________ Notes: ___________________________________  How do I manage my blood sugar?    Check your blood sugar levels using a blood glucose monitor as directed by your doctor.  Your doctor will set treatment goals for you. Generally, you should have these blood sugar levels:  · Before meals (preprandial):  mg/dL (4.4-7.2 mmol/L).  · After meals (postprandial): below 180 mg/dL (10 mmol/L).  · A1c level: less than 7%.  Write down the times that you will check your blood sugar levels:  Blood sugar checks  · Time: _______________ Notes: ___________________________________  · Time: _______________ Notes: ___________________________________  · Time: _______________ Notes: ___________________________________  · Time: _______________ Notes: ___________________________________  · Time: _______________ Notes: ___________________________________  · Time: _______________ Notes: ___________________________________    What do I need to know about low blood sugar?  Low blood sugar is called hypoglycemia. This is when blood sugar is at or below 70 mg/dL (3.9 mmol/L). Symptoms may include:  · Feeling:  ? Hungry.  ? Worried or nervous (anxious).  ? Sweaty and clammy.  ? Confused.  ? Dizzy.  ? Sleepy.  ? Sick to your stomach (nauseous).  · Having:  ? A fast heartbeat.  ? A headache.  ? A change in your vision.  ? Tingling or no feeling (numbness) around the mouth, lips, or tongue.  ? Jerky movements that you cannot control (seizure).  · Having trouble with:  ? Moving (coordination).  ? Sleeping.  ? Passing out (fainting).  ? Getting upset easily (irritability).  Treating low blood sugar  To treat low blood  sugar, eat or drink something sugary right away. If you can think clearly and swallow safely, follow the 15:15 rule:  · Take 15 grams of a fast-acting carb (carbohydrate). Talk with your doctor about how much you should take.  · Some fast-acting carbs are:  ? Sugar tablets (glucose pills). Take 3-4 glucose pills.  ? 6-8 pieces of hard candy.  ? 4-6 oz (120-150 mL) of fruit juice.  ? 4-6 oz (120-150 mL) of regular (not diet) soda.  ? 1 Tbsp (15 mL) honey or sugar.  · Check your blood sugar 15 minutes after you take the carb.  · If your blood sugar is still at or below 70 mg/dL (3.9 mmol/L), take 15 grams of a carb again.  · If your blood sugar does not go above 70 mg/dL (3.9 mmol/L) after 3 tries, get help right away.  · After your blood sugar goes back to normal, eat a meal or a snack within 1 hour.  Treating very low blood sugar  If your blood sugar is at or below 54 mg/dL (3 mmol/L), you have very low blood sugar (severe hypoglycemia). This is an emergency. Do not wait to see if the symptoms will go away. Get medical help right away. Call your local emergency services (911 in the [place] Do not drive yourself to the hospital.  Questions to ask your health care provider  · Do I need to meet with a diabetes educator?  · What equipment will I need to care for myself at home?  · What diabetes medicines do I need? When should I take them?  · How often do I need to check my blood sugar?  · What number can I call if I have questions?  · When is my next doctor's visit?  · Where can I find a support group for people with diabetes?  Where to find more information  · American Diabetes Association: www.diabetes.org  · American Association of Diabetes Educators: www.diabeteseducator.org/patient-resources  Contact a doctor if:  · Your blood sugar is at or above 240 mg/dL (13.3 mmol/L) for 2 days in a row.  · You have been sick or have had a fever for 2 days or more, and you are not getting better.  · You have any of these  problems for more than 6 hours:  ? You cannot eat or drink.  ? You feel sick to your stomach (nauseous).  ? You throw up (vomit).  ? You have watery poop (diarrhea).  Get help right away if:  · Your blood sugar is lower than 54 mg/dL (3 mmol/L).  · You get confused.  · You have trouble:  ? Thinking clearly.  ? Breathing.  Summary  · Diabetes (diabetes mellitus) is a long-term (chronic) disease. It occurs when the body does not properly use sugar (glucose) that is released from food after digestion.  · Take insulin and diabetes medicines as told.  · Check your blood sugar every day, as often as told.  · Keep all follow-up visits as told by your doctor. This is important.  This information is not intended to replace advice given to you by your health care provider. Make sure you discuss any questions you have with your health care provider.  Document Revised: 09/09/2020 Document Reviewed: 03/22/2019  [employer] Patient Education © 2021 [employer]      Hypertension, Adult  Hypertension is another name for high blood pressure. High blood pressure forces your heart to work harder to pump blood. This can cause problems over time.  There are two numbers in a blood pressure reading. There is a top number (systolic) over a bottom number (diastolic). It is best to have a blood pressure that is below 120/80. Healthy choices can help lower your blood pressure, or you may need medicine to help lower it.  What are the causes?  The cause of this condition is not known. Some conditions may be related to high blood pressure.  What increases the risk?  · Smoking.  · Having type 2 diabetes mellitus, high cholesterol, or both.  · Not getting enough exercise or physical activity.  · Being overweight.  · Having too much fat, sugar, calories, or salt (sodium) in your diet.  · Drinking too much alcohol.  · Having long-term (chronic) kidney disease.  · Having a family history of high blood pressure.  · Age. Risk increases with  age.  · Race. You may be at higher risk if you are .  · Gender. Men are at higher risk than women before age 45. After age 65, women are at higher risk than men.  · Having obstructive sleep apnea.  · Stress.  What are the signs or symptoms?  · High blood pressure may not cause symptoms. Very high blood pressure (hypertensive crisis) may cause:  ? Headache.  ? Feelings of worry or nervousness (anxiety).  ? Shortness of breath.  ? Nosebleed.  ? A feeling of being sick to your stomach (nausea).  ? Throwing up (vomiting).  ? Changes in how you see.  ? Very bad chest pain.  ? Seizures.  How is this treated?  · This condition is treated by making healthy lifestyle changes, such as:  ? Eating healthy foods.  ? Exercising more.  ? Drinking less alcohol.  · Your health care provider may prescribe medicine if lifestyle changes are not enough to get your blood pressure under control, and if:  ? Your top number is above 130.  ? Your bottom number is above 80.  · Your personal target blood pressure may vary.  Follow these instructions at home:  Eating and drinking    · If told, follow the DASH eating plan. To follow this plan:  ? Fill one half of your plate at each meal with fruits and vegetables.  ? Fill one fourth of your plate at each meal with whole grains. Whole grains include whole-wheat pasta, brown rice, and whole-grain bread.  ? Eat or drink low-fat dairy products, such as skim milk or low-fat yogurt.  ? Fill one fourth of your plate at each meal with low-fat (lean) proteins. Low-fat proteins include fish, chicken without skin, eggs, beans, and tofu.  ? Avoid fatty meat, cured and processed meat, or chicken with skin.  ? Avoid pre-made or processed food.  · Eat less than 1,500 mg of salt each day.  · Do not drink alcohol if:  ? Your doctor tells you not to drink.  ? You are pregnant, may be pregnant, or are planning to become pregnant.  · If you drink alcohol:  ? Limit how much you use to:  § 0-1 drink a  day for women.  § 0-2 drinks a day for men.  ? Be aware of how much alcohol is in your drink. In the [place] one drink equals one 12 oz bottle of beer (355 mL), one 5 oz glass of wine (148 mL), or one 1½ oz glass of hard liquor (44 mL).  Lifestyle    · Work with your doctor to stay at a healthy weight or to lose weight. Ask your doctor what the best weight is for you.  · Get at least 30 minutes of exercise most days of the week. This may include walking, swimming, or biking.  · Get at least 30 minutes of exercise that strengthens your muscles (resistance exercise) at least 3 days a week. This may include lifting weights or doing Pilates.  · Do not use any products that contain nicotine or tobacco, such as cigarettes, e-cigarettes, and chewing tobacco. If you need help quitting, ask your doctor.  · Check your blood pressure at home as told by your doctor.  · Keep all follow-up visits as told by your doctor. This is important.  Medicines  · Take over-the-counter and prescription medicines only as told by your doctor. Follow directions carefully.  · Do not skip doses of blood pressure medicine. The medicine does not work as well if you skip doses. Skipping doses also puts you at risk for problems.  · Ask your doctor about side effects or reactions to medicines that you should watch for.  Contact a doctor if you:  · Think you are having a reaction to the medicine you are taking.  · Have headaches that keep coming back (recurring).  · Feel dizzy.  · Have swelling in your ankles.  · Have trouble with your vision.  Get help right away if you:  · Get a very bad headache.  · Start to feel mixed up (confused).  · Feel weak or numb.  · Feel faint.  · Have very bad pain in your:  ? Chest.  ? Belly (abdomen).  · Throw up more than once.  · Have trouble breathing.  Summary  · Hypertension is another name for high blood pressure.  · High blood pressure forces your heart to work harder to pump blood.  · For most people, a normal  blood pressure is less than 120/80.  · Making healthy choices can help lower blood pressure. If your blood pressure does not get lower with healthy choices, you may need to take medicine.  This information is not intended to replace advice given to you by your health care provider. Make sure you discuss any questions you have with your health care provider.  Document Revised: 08/28/2019 Document Reviewed: 08/28/2019  [employer] Patient Education © 2021 [employer]      High Cholesterol    High cholesterol is a condition in which the blood has high levels of a white, waxy substance similar to fat (cholesterol). The liver makes all the cholesterol that the body needs. The human body needs small amounts of cholesterol to help build cells. A person gets extra or excess cholesterol from the food that he or she eats.  The blood carries cholesterol from the liver to the rest of the body. If you have high cholesterol, deposits (plaques) may build up on the walls of your arteries. Arteries are the blood vessels that carry blood away from your heart. These plaques make the arteries narrow and stiff.  Cholesterol plaques increase your risk for heart attack and stroke. Work with your health care provider to keep your cholesterol levels in a healthy range.  What increases the risk?  The following factors may make you more likely to develop this condition:  · Eating foods that are high in animal fat (saturated fat) or cholesterol.  · Being overweight.  · Not getting enough exercise.  · A family history of high cholesterol (familial hypercholesterolemia).  · Use of tobacco products.  · Having diabetes.  What are the signs or symptoms?  There are no symptoms of this condition.  How is this diagnosed?  This condition may be diagnosed based on the results of a blood test.  · If you are older than 20 years of age, your health care provider may check your cholesterol levels every 4-6 years.  · You may be checked more often if you have  "high cholesterol or other risk factors for heart disease.  The blood test for cholesterol measures:  · \"Bad\" cholesterol, or LDL cholesterol. This is the main type of cholesterol that causes heart disease. The desired level is less than 100 mg/dL.  · \"Good\" cholesterol, or HDL cholesterol. HDL helps protect against heart disease by cleaning the arteries and carrying the LDL to the liver for processing. The desired level for HDL is 60 mg/dL or higher.  · Triglycerides. These are fats that your body can store or burn for energy. The desired level is less than 150 mg/dL.  · Total cholesterol. This measures the total amount of cholesterol in your blood and includes LDL, HDL, and triglycerides. The desired level is less than 200 mg/dL.  How is this treated?  This condition may be treated with:  · Diet changes. You may be asked to eat foods that have more fiber and less saturated fats or added sugar.  · Lifestyle changes. These may include regular exercise, maintaining a healthy weight, and quitting use of tobacco products.  · Medicines. These are given when diet and lifestyle changes have not worked. You may be prescribed a statin medicine to help lower your cholesterol levels.  Follow these instructions at home:  Eating and drinking    · Eat a healthy, balanced diet. This diet includes:  ? Daily servings of a variety of fresh, frozen, or canned fruits and vegetables.  ? Daily servings of whole grain foods that are rich in fiber.  ? Foods that are low in saturated fats and trans fats. These include poultry and fish without skin, lean cuts of meat, and low-fat dairy products.  ? A variety of fish, especially oily fish that contain omega-3 fatty acids. Aim to eat fish at least 2 times a week.  · Avoid foods and drinks that have added sugar.  · Use healthy cooking methods, such as roasting, grilling, broiling, baking, poaching, steaming, and stir-frying. Do not felix your food except for stir-frying.  Lifestyle    · Get " "regular exercise. Aim to exercise for a total of 150 minutes a week. Increase your activity level by doing activities such as gardening, walking, and taking the stairs.  · Do not use any products that contain nicotine or tobacco, such as cigarettes, e-cigarettes, and chewing tobacco. If you need help quitting, ask your health care provider.  General instructions  · Take over-the-counter and prescription medicines only as told by your health care provider.  · Keep all follow-up visits as told by your health care provider. This is important.  Where to find more information  · American Heart Association: www.heart.org  · National Heart, Lung, and Blood [place]: www.nhlbi.nih.gov  Contact a health care provider if:  · You have trouble achieving or maintaining a healthy diet or weight.  · You are starting an exercise program.  · You are unable to stop smoking.  Get help right away if:  · You have chest pain.  · You have trouble breathing.  · You have any symptoms of a stroke. \"BE FAST\" is an easy way to remember the main warning signs of a stroke:  ? B - Balance. Signs are dizziness, sudden trouble walking, or loss of balance.  ? E - Eyes. Signs are trouble seeing or a sudden change in vision.  ? F - Face. Signs are sudden weakness or numbness of the face, or the face or eyelid drooping on one side.  ? A - Arms. Signs are weakness or numbness in an arm. This happens suddenly and usually on one side of the body.  ? S - Speech. Signs are sudden trouble speaking, slurred speech, or trouble understanding what people say.  ? T - Time. Time to call emergency services. Write down what time symptoms started.  · You have other signs of a stroke, such as:  ? A sudden, severe headache with no known cause.  ? Nausea or vomiting.  ? Seizure.  These symptoms may represent a serious problem that is an emergency. Do not wait to see if the symptoms will go away. Get medical help right away. Call your local emergency services (193 in " "the [place] Do not drive yourself to the hospital.  Summary  · Cholesterol plaques increase your risk for heart attack and stroke. Work with your health care provider to keep your cholesterol levels in a healthy range.  · Eat a healthy, balanced diet, get regular exercise, and maintain a healthy weight.  · Do not use any products that contain nicotine or tobacco, such as cigarettes, e-cigarettes, and chewing tobacco.  · Get help right away if you have any symptoms of a stroke.  This information is not intended to replace advice given to you by your health care provider. Make sure you discuss any questions you have with your health care provider.  Document Revised: 11/16/2020 Document Reviewed: 11/16/2020  [employer] Patient Education © 2021 [employer]      Diabetes Mellitus and Exercise  Exercising regularly is important for overall health, especially for people who have diabetes mellitus. Exercising is not only about losing weight. It has many other health benefits, such as increasing muscle strength and bone density and reducing body fat and stress. This leads to improved fitness, flexibility, and endurance, all of which result in better overall health.  What are the benefits of exercise if I have diabetes?  Exercise has many benefits for people with diabetes. They include:  · Helping to lower and control blood sugar (glucose).  · Helping the body to respond better to the hormone insulin by improving insulin sensitivity.  · Reducing how much insulin the body needs.  · Lowering the risk for heart disease by:  ? Lowering \"bad\" cholesterol and triglyceride levels.  ? Increasing \"good\" cholesterol levels.  ? Lowering blood pressure.  ? Lowering blood glucose levels.  What is my activity plan?  Your health care provider or certified diabetes educator can help you make a plan for the type and frequency of exercise that works for you. This is called your activity plan. Be sure to:  · Get at least 150 minutes of " medium-intensity or high-intensity exercise each week. Exercises may include brisk walking, biking, or water aerobics.  · Do stretching and strengthening exercises, such as yoga or weight lifting, at least 2 times a week.  · Spread out your activity over at least 3 days of the week.  · Get some form of physical activity each day.  ? Do not go more than 2 days in a row without some kind of physical activity.  ? Avoid being inactive for more than 90 minutes at a time. Take frequent breaks to walk or stretch.  · Choose exercises or activities that you enjoy. Set realistic goals.  · Start slowly and gradually increase your exercise intensity over time.  How do I manage my diabetes during exercise?    Monitor your blood glucose  · Check your blood glucose before and after exercising. If your blood glucose is:  ? 240 mg/dL (13.3 mmol/L) or higher before you exercise, check your urine for ketones. These are chemicals created by the liver. If you have ketones in your urine, do not exercise until your blood glucose returns to normal.  ? 100 mg/dL (5.6 mmol/L) or lower, eat a snack containing 15-20 grams of carbohydrate. Check your blood glucose 15 minutes after the snack to make sure that your glucose level is above 100 mg/dL (5.6 mmol/L) before you start your exercise.  · Know the symptoms of low blood glucose (hypoglycemia) and how to treat it. Your risk for hypoglycemia increases during and after exercise.  Follow these tips and your health care provider's instructions  · Keep a carbohydrate snack that is fast-acting for use before, during, and after exercise to help prevent or treat hypoglycemia.  · Avoid injecting insulin into areas of the body that are going to be exercised. For example, avoid injecting insulin into:  ? Your arms, when you are about to play tennis.  ? Your legs, when you are about to go jogging.  · Keep records of your exercise habits. Doing this can help you and your health care provider adjust your  diabetes management plan as needed. Write down:  ? Food that you eat before and after you exercise.  ? Blood glucose levels before and after you exercise.  ? The type and amount of exercise you have done.  · Work with your health care provider when you start a new exercise or activity. He or she may need to:  ? Make sure that the activity is safe for you.  ? Adjust your insulin, other medicines, and food that you eat.  · Drink plenty of water while you exercise. This prevents loss of water (dehydration) and problems caused by a lot of heat in the body (heat stroke).  Where to find more information  · American Diabetes Association: www.diabetes.org  Summary  · Exercising regularly is important for overall health, especially for people who have diabetes mellitus.  · Exercising has many health benefits. It increases muscle strength and bone density and reduces body fat and stress. It also lowers and controls blood glucose.  · Your health care provider or certified diabetes educator can help you make an activity plan for the type and frequency of exercise that works for you.  · Work with your health care provider to make sure any new activity is safe for you. Also work with your health care provider to adjust your insulin, other medicines, and the food you eat.  This information is not intended to replace advice given to you by your health care provider. Make sure you discuss any questions you have with your health care provider.  Document Revised: 09/14/2020 Document Reviewed: 09/14/2020  [employer] Patient Education © 2021 [employer]      Diabetes Mellitus and Nutrition, Adult  When you have diabetes, or diabetes mellitus, it is very important to have healthy eating habits because your blood sugar (glucose) levels are greatly affected by what you eat and drink. Eating healthy foods in the right amounts, at about the same times every day, can help you:  · Control your blood glucose.  · Lower your risk of heart  disease.  · Improve your blood pressure.  · Reach or maintain a healthy weight.  What can affect my meal plan?  Every person with diabetes is different, and each person has different needs for a meal plan. Your health care provider may recommend that you work with a dietitian to make a meal plan that is best for you. Your meal plan may vary depending on factors such as:  · The calories you need.  · The medicines you take.  · Your weight.  · Your blood glucose, blood pressure, and cholesterol levels.  · Your activity level.  · Other health conditions you have, such as heart or kidney disease.  How do carbohydrates affect me?  Carbohydrates, also called carbs, affect your blood glucose level more than any other type of food. Eating carbs naturally raises the amount of glucose in your blood. Carb counting is a method for keeping track of how many carbs you eat. Counting carbs is important to keep your blood glucose at a healthy level, especially if you use insulin or take certain oral diabetes medicines.  It is important to know how many carbs you can safely have in each meal. This is different for every person. Your dietitian can help you calculate how many carbs you should have at each meal and for each snack.  How does alcohol affect me?  Alcohol can cause a sudden decrease in blood glucose (hypoglycemia), especially if you use insulin or take certain oral diabetes medicines. Hypoglycemia can be a life-threatening condition. Symptoms of hypoglycemia, such as sleepiness, dizziness, and confusion, are similar to symptoms of having too much alcohol.  · Do not drink alcohol if:  ? Your health care provider tells you not to drink.  ? You are pregnant, may be pregnant, or are planning to become pregnant.  · If you drink alcohol:  ? Do not drink on an empty stomach.  ? Limit how much you use to:  § 0-1 drink a day for women.  § 0-2 drinks a day for men.  ? Be aware of how much alcohol is in your drink. In the [place] one  "drink equals one 12 oz bottle of beer (355 mL), one 5 oz glass of wine (148 mL), or one 1½ oz glass of hard liquor (44 mL).  ? Keep yourself hydrated with water, diet soda, or unsweetened iced tea.  § Keep in mind that regular soda, juice, and other mixers may contain a lot of sugar and must be counted as carbs.  What are tips for following this plan?    Reading food labels  · Start by checking the serving size on the \"Nutrition Facts\" label of packaged foods and drinks. The amount of calories, carbs, fats, and other nutrients listed on the label is based on one serving of the item. Many items contain more than one serving per package.  · Check the total grams (g) of carbs in one serving. You can calculate the number of servings of carbs in one serving by dividing the total carbs by 15. For example, if a food has 30 g of total carbs per serving, it would be equal to 2 servings of carbs.  · Check the number of grams (g) of saturated fats and trans fats in one serving. Choose foods that have a low amount or none of these fats.  · Check the number of milligrams (mg) of salt (sodium) in one serving. Most people should limit total sodium intake to less than 2,300 mg per day.  · Always check the nutrition information of foods labeled as \"low-fat\" or \"nonfat.\" These foods may be higher in added sugar or refined carbs and should be avoided.  · Talk to your dietitian to identify your daily goals for nutrients listed on the label.  Shopping  · Avoid buying canned, pre-made, or processed foods. These foods tend to be high in fat, sodium, and added sugar.  · Shop around the outside edge of the grocery store. This is where you will most often find fresh fruits and vegetables, bulk grains, fresh meats, and fresh dairy.  Cooking  · Use low-heat cooking methods, such as baking, instead of high-heat cooking methods like deep frying.  · Cook using healthy oils, such as olive, canola, or sunflower oil.  · Avoid cooking with butter, " cream, or high-fat meats.  Meal planning  · Eat meals and snacks regularly, preferably at the same times every day. Avoid going long periods of time without eating.  · Eat foods that are high in fiber, such as fresh fruits, vegetables, beans, and whole grains. Talk with your dietitian about how many servings of carbs you can eat at each meal.  · Eat 4-6 oz (112-168 g) of lean protein each day, such as lean meat, chicken, fish, eggs, or tofu. One ounce (oz) of lean protein is equal to:  ? 1 oz (28 g) of meat, chicken, or fish.  ? 1 egg.  ? ¼ cup (62 g) of tofu.  · Eat some foods each day that contain healthy fats, such as avocado, nuts, seeds, and fish.  What foods should I eat?  Fruits  Berries. Apples. Oranges. Peaches. Apricots. Plums. Grapes. Farhan. Papaya. Pomegranate. Kiwi. Cherries.  Vegetables  Lettuce. Spinach. Leafy greens, including kale, chard, zafar greens, and mustard greens. Beets. Cauliflower. Cabbage. Broccoli. Carrots. Green beans. Tomatoes. Peppers. Onions. Cucumbers. [place] sprouts.  Grains  Whole grains, such as whole-wheat or whole-grain bread, crackers, tortillas, cereal, and pasta. Unsweetened oatmeal. Quinoa. Brown or wild rice.  Meats and other proteins  Seafood. Poultry without skin. Lean cuts of poultry and beef. Tofu. Nuts. Seeds.  Dairy  Low-fat or fat-free dairy products such as milk, yogurt, and cheese.  The items listed above may not be a complete list of foods and beverages you can eat. Contact a dietitian for more information.  What foods should I avoid?  Fruits  Fruits canned with syrup.  Vegetables  Canned vegetables. Frozen vegetables with butter or cream sauce.  Grains  Refined white flour and flour products such as bread, pasta, snack foods, and cereals. Avoid all processed foods.  Meats and other proteins  Fatty cuts of meat. Poultry with skin. Breaded or fried meats. Processed meat. Avoid saturated fats.  Dairy  Full-fat yogurt, cheese, or milk.  Beverages  Sweetened  "drinks, such as soda or iced tea.  The items listed above may not be a complete list of foods and beverages you should avoid. Contact a dietitian for more information.  Questions to ask a health care provider  · Do I need to meet with a diabetes educator?  · Do I need to meet with a dietitian?  · What number can I call if I have questions?  · When are the best times to check my blood glucose?  Where to find more information:  · American Diabetes Association: diabetes.org  · Academy of Nutrition and Dietetics: www.eatright.org  · National [place] of Diabetes and Digestive and Kidney Diseases: www.niddk.nih.gov  · Association of Diabetes Care and Education Specialists: www.diabeteseducator.org  Summary  · It is important to have healthy eating habits because your blood sugar (glucose) levels are greatly affected by what you eat and drink.  · A healthy meal plan will help you control your blood glucose and maintain a healthy lifestyle.  · Your health care provider may recommend that you work with a dietitian to make a meal plan that is best for you.  · Keep in mind that carbohydrates (carbs) and alcohol have immediate effects on your blood glucose levels. It is important to count carbs and to use alcohol carefully.  This information is not intended to replace advice given to you by your health care provider. Make sure you discuss any questions you have with your health care provider.  Document Revised: 11/24/2020 Document Reviewed: 11/24/2020  [employer] Patient Education © 2021 [employer]      https://doi.[email or web address]/[email or web address]\">   Chronic Back Pain  When back pain lasts longer than 3 months, it is called chronic back pain. The cause of your back pain may not be known. Some common causes include:  · Wear and tear (degenerative disease) of the bones, ligaments, or disks in your back.  · Inflammation and stiffness in your back (arthritis).  People who have chronic back pain often go through certain periods in " which the pain is more intense (flare-ups). Many people can learn to manage the pain with home care.  Follow these instructions at home:  Pay attention to any changes in your symptoms. Take these actions to help with your pain:  Managing pain and stiffness         · If directed, apply ice to the painful area. Your health care provider may recommend applying ice during the first 24-48 hours after a flare-up begins. To do this:  ? Put ice in a plastic bag.  ? Place a towel between your skin and the bag.  ? Leave the ice on for 20 minutes, 2-3 times per day.  · If directed, apply heat to the affected area as often as told by your health care provider. Use the heat source that your health care provider recommends, such as a moist heat pack or a heating pad.  ? Place a towel between your skin and the heat source.  ? Leave the heat on for 20-30 minutes.  ? Remove the heat if your skin turns bright red. This is especially important if you are unable to feel pain, heat, or cold. You may have a greater risk of getting burned.  · Try soaking in a warm tub.  Activity    · Avoid bending and other activities that make the problem worse.  · Maintain a proper position when standing or sitting:  ? When standing, keep your upper back and neck straight, with your shoulders pulled back. Avoid slouching.  ? When sitting, keep your back straight and relax your shoulders. Do not round your shoulders or pull them backward.  · Do not sit or  one place for long periods of time.  · Take brief periods of rest throughout the day. This will reduce your pain. Resting in a lying or standing position is usually better than sitting to rest.  · When you are resting for longer periods, mix in some mild activity or stretching between periods of rest. This will help to prevent stiffness and pain.  · Get regular exercise. Ask your health care provider what activities are safe for you.  · Do not lift anything that is heavier than 10 lb (4.5 kg),  or the limit that you are told, until your health care provider says that it is safe. Always use proper lifting technique, which includes:  ? Bending your knees.  ? Keeping the load close to your body.  ? Avoiding twisting.  · Sleep on a firm mattress in a comfortable position. Try lying on your side with your knees slightly bent. If you lie on your back, put a pillow under your knees.  Medicines  · Treatment may include medicines for pain and inflammation taken by mouth or applied to the skin, prescription pain medicine, or muscle relaxants. Take over-the-counter and prescription medicines only as told by your health care provider.  · Ask your health care provider if the medicine prescribed to you:  ? Requires you to avoid driving or using machinery.  ? Can cause constipation. You may need to take these actions to prevent or treat constipation:  § Drink enough fluid to keep your urine pale yellow.  § Take over-the-counter or prescription medicines.  § Eat foods that are high in fiber, such as beans, whole grains, and fresh fruits and vegetables.  § Limit foods that are high in fat and processed sugars, such as fried or sweet foods.  General instructions  · Do not use any products that contain nicotine or tobacco, such as cigarettes, e-cigarettes, and chewing tobacco. If you need help quitting, ask your health care provider.  · Keep all follow-up visits as told by your health care provider. This is important.  Contact a health care provider if:  · You have pain that is not relieved with rest or medicine.  · Your pain gets worse, or you have new pain.  · You have a high fever.  · You have rapid weight loss.  · You have trouble doing your normal activities.  Get help right away if:  · You have weakness or numbness in one or both of your legs or feet.  · You have trouble controlling your bladder or your bowels.  · You have severe back pain and have any of the following:  ? Nausea or vomiting.  ? Pain in your  abdomen.  ? Shortness of breath or you faint.  Summary  · Chronic back pain is back pain that lasts longer than 3 months.  · When a flare-up begins, apply ice to the painful area for the first 24-48 hours.  · Apply a moist heat pad or use a heating pad on the painful area as directed by your health care provider.  · When you are resting for longer periods, mix in some mild activity or stretching between periods of rest. This will help to prevent stiffness and pain.  This information is not intended to replace advice given to you by your health care provider. Make sure you discuss any questions you have with your health care provider.  Document Revised: 01/27/2021 Document Reviewed: 01/27/2021  [employer] Patient Education © 2021 [employer]

## 2021-08-20 LAB
ALBUMIN SERPL-MCNC: 12.7 G/DL (ref 3.5–5.2)
ALBUMIN/GLOB SERPL: -2.3 G/DL
ALP SERPL-CCNC: 76 U/L (ref 39–117)
ALT SERPL W P-5'-P-CCNC: 24 U/L (ref 1–33)
AMORPH URATE CRY URNS QL MICRO: ABNORMAL /HPF
ANION GAP SERPL CALCULATED.3IONS-SCNC: 11.9 MMOL/L (ref 5–15)
AST SERPL-CCNC: 19 U/L (ref 1–32)
BACTERIA UR QL AUTO: ABNORMAL /HPF
BASOPHILS # BLD AUTO: 0.05 10*3/MM3 (ref 0–0.2)
BASOPHILS NFR BLD AUTO: 0.6 % (ref 0–1.5)
BILIRUB SERPL-MCNC: 0.5 MG/DL (ref 0–1.2)
BILIRUB UR QL STRIP: NEGATIVE
BUN SERPL-MCNC: 11 MG/DL (ref 8–23)
BUN/CREAT SERPL: 22.4 (ref 7–25)
CALCIUM SPEC-SCNC: 9.8 MG/DL (ref 8.6–10.5)
CHLORIDE SERPL-SCNC: 102 MMOL/L (ref 98–107)
CHOLEST SERPL-MCNC: 107 MG/DL (ref 0–200)
CLARITY UR: ABNORMAL
CO2 SERPL-SCNC: 24.1 MMOL/L (ref 22–29)
COLOR UR: YELLOW
CREAT SERPL-MCNC: 0.49 MG/DL (ref 0.57–1)
DEPRECATED RDW RBC AUTO: 36.8 FL (ref 37–54)
EOSINOPHIL # BLD AUTO: 0 10*3/MM3 (ref 0–0.4)
EOSINOPHIL NFR BLD AUTO: 0 % (ref 0.3–6.2)
ERYTHROCYTE [DISTWIDTH] IN BLOOD BY AUTOMATED COUNT: 11.9 % (ref 12.3–15.4)
GFR SERPL CREATININE-BSD FRML MDRD: 127 ML/MIN/1.73
GLOBULIN UR ELPH-MCNC: -5.5 GM/DL
GLUCOSE SERPL-MCNC: 158 MG/DL (ref 65–99)
GLUCOSE UR STRIP-MCNC: NEGATIVE MG/DL
HBA1C MFR BLD: 7.68 % (ref 4.8–5.6)
HCT VFR BLD AUTO: 41.8 % (ref 34–46.6)
HDLC SERPL-MCNC: 60 MG/DL (ref 40–60)
HGB BLD-MCNC: 13.8 G/DL (ref 12–15.9)
HGB UR QL STRIP.AUTO: NEGATIVE
HYALINE CASTS UR QL AUTO: ABNORMAL /LPF
IMM GRANULOCYTES # BLD AUTO: 0.05 10*3/MM3 (ref 0–0.05)
IMM GRANULOCYTES NFR BLD AUTO: 0.6 % (ref 0–0.5)
KETONES UR QL STRIP: ABNORMAL
LDLC SERPL CALC-MCNC: 30 MG/DL (ref 0–100)
LDLC/HDLC SERPL: 0.49 {RATIO}
LEUKOCYTE ESTERASE UR QL STRIP.AUTO: ABNORMAL
LYMPHOCYTES # BLD AUTO: 2.58 10*3/MM3 (ref 0.7–3.1)
LYMPHOCYTES NFR BLD AUTO: 30.6 % (ref 19.6–45.3)
MCH RBC QN AUTO: 28.1 PG (ref 26.6–33)
MCHC RBC AUTO-ENTMCNC: 33 G/DL (ref 31.5–35.7)
MCV RBC AUTO: 85.1 FL (ref 79–97)
MONOCYTES # BLD AUTO: 0.54 10*3/MM3 (ref 0.1–0.9)
MONOCYTES NFR BLD AUTO: 6.4 % (ref 5–12)
NEUTROPHILS NFR BLD AUTO: 5.22 10*3/MM3 (ref 1.7–7)
NEUTROPHILS NFR BLD AUTO: 61.8 % (ref 42.7–76)
NITRITE UR QL STRIP: NEGATIVE
NRBC BLD AUTO-RTO: 0.1 /100 WBC (ref 0–0.2)
PH UR STRIP.AUTO: 5.5 [PH] (ref 5–8)
PLATELET # BLD AUTO: 265 10*3/MM3 (ref 140–450)
PMV BLD AUTO: 11.3 FL (ref 6–12)
POTASSIUM SERPL-SCNC: 5.3 MMOL/L (ref 3.5–5.2)
PROT SERPL-MCNC: 7.2 G/DL (ref 6–8.5)
PROT UR QL STRIP: NEGATIVE
RBC # BLD AUTO: 4.91 10*6/MM3 (ref 3.77–5.28)
RBC # UR: ABNORMAL /HPF
REF LAB TEST METHOD: ABNORMAL
SODIUM SERPL-SCNC: 138 MMOL/L (ref 136–145)
SP GR UR STRIP: 1.02 (ref 1–1.03)
SQUAMOUS #/AREA URNS HPF: ABNORMAL /HPF
TRIGL SERPL-MCNC: 89 MG/DL (ref 0–150)
TSH SERPL DL<=0.05 MIU/L-ACNC: 2.39 UIU/ML (ref 0.27–4.2)
UROBILINOGEN UR QL STRIP: ABNORMAL
VLDLC SERPL-MCNC: 17 MG/DL (ref 5–40)
WBC # BLD AUTO: 8.44 10*3/MM3 (ref 3.4–10.8)
WBC UR QL AUTO: ABNORMAL /HPF

## 2021-08-21 LAB — BACTERIA SPEC AEROBE CULT: NORMAL

## 2021-09-15 ENCOUNTER — OFFICE VISIT (OUTPATIENT)
Dept: FAMILY MEDICINE CLINIC | Facility: CLINIC | Age: 66
End: 2021-09-15

## 2021-09-15 VITALS
SYSTOLIC BLOOD PRESSURE: 142 MMHG | DIASTOLIC BLOOD PRESSURE: 72 MMHG | WEIGHT: 174.6 LBS | TEMPERATURE: 97.7 F | HEART RATE: 73 BPM | HEIGHT: 64 IN | OXYGEN SATURATION: 99 % | BODY MASS INDEX: 29.81 KG/M2

## 2021-09-15 DIAGNOSIS — E11.40 TYPE 2 DIABETES MELLITUS WITH DIABETIC NEUROPATHY, WITHOUT LONG-TERM CURRENT USE OF INSULIN (HCC): ICD-10-CM

## 2021-09-15 DIAGNOSIS — I10 ESSENTIAL HYPERTENSION: Primary | ICD-10-CM

## 2021-09-15 DIAGNOSIS — Z56.6 WORK STRESS: ICD-10-CM

## 2021-09-15 DIAGNOSIS — K58.2 IRRITABLE BOWEL SYNDROME WITH BOTH CONSTIPATION AND DIARRHEA: ICD-10-CM

## 2021-09-15 DIAGNOSIS — R51.9 NONINTRACTABLE HEADACHE, UNSPECIFIED CHRONICITY PATTERN, UNSPECIFIED HEADACHE TYPE: ICD-10-CM

## 2021-09-15 DIAGNOSIS — E11.65 TYPE 2 DIABETES MELLITUS WITH HYPERGLYCEMIA, WITHOUT LONG-TERM CURRENT USE OF INSULIN (HCC): ICD-10-CM

## 2021-09-15 DIAGNOSIS — E87.5 HYPERKALEMIA: ICD-10-CM

## 2021-09-15 DIAGNOSIS — F51.04 PSYCHOPHYSIOLOGICAL INSOMNIA: ICD-10-CM

## 2021-09-15 DIAGNOSIS — Z51.81 MEDICATION MONITORING ENCOUNTER: ICD-10-CM

## 2021-09-15 PROCEDURE — 99214 OFFICE O/P EST MOD 30 MIN: CPT | Performed by: FAMILY MEDICINE

## 2021-09-15 SDOH — HEALTH STABILITY - MENTAL HEALTH: OTHER PHYSICAL AND MENTAL STRAIN RELATED TO WORK: Z56.6

## 2021-09-15 NOTE — PROGRESS NOTES
Chief Complaint  Hypertension  Sleep issues  Work stress  Dull ache on left side    Subjective          Leilani Ordaz presents to [facility]  History of Present Illness  Patient presents today for follow-up for hypertension.  She was last seen at our clinic by RUEL Benton.  At that time she was placed on hydrochlorothiazide 12.5 mg.  She reports that this helped get her blood pressure under better control.  She brings a log in today where she shows several normotensive levels.  She has a few lower levels.  She denies any symptoms with this.  She does admit to being more anxious today.  Blood pressure today was 142/72 and on repeat was 154/78.  She does get headaches a lot which she attributes to work.  Work is been very stressful.  She reports that she is working 50 to 60 hours a week and that the phone never stops drinking at work.  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  She has several issues that have been bothering her as of late.  1 of these is insomnia.  She states that she has disruptive sleep and that she thinks about work a lot while she is supposed to be sleeping.  She has been stress eating as well.  She also gets a dull ache on the left side of her abdomen.  She does not have it today but but it has been going on for about 2 months.  She does not have it every day. She does have still have issues with loose stools and sometimes constipation.  She alternates between the 2.  She sometimes has a dull ache on her left side as discussed above. He does also have some numbness and shooting pains in her left foot.  She points to the dorsum of her left foot.  She does have type 2 diabetes.  She had low back pain when she was seen back on 8/19/2021 but this has resolved.  She reports that the pain is localized with numbness and shooting pains to the dorsum of her left foot.  I reviewed her most recent lab work.  Her A1c is 7.68%.  Her lipid  "panel is overall within appropriate limits.  LDL is 30.  Her thyroid level is appropriate.  She had a urinalysis and a urine culture that showed mixed monique.  She denies any symptoms of urinary tract infection.  She did have a CMP done which showed a slightly elevated potassium of 5.3.  I discussed with patient having this lab repeated.  She does report having a fracture of the second digit of her left foot several months back.  She was seeing podiatry at that time.  She was referred back to podiatry and has an appointment in October to see them again.  Objective   Vital Signs:   /72   Pulse 73   Temp 97.7 °F (36.5 °C)   Ht 162.6 cm (64\")   Wt 79.2 kg (174 lb 9.6 oz)   SpO2 99%   BMI 29.97 kg/m²     Physical Exam   General: AAO ×3, no acute distress, pleasant  HEENT: Normocephalic, atraumatic, no discharge in the eyes, no discharge from the nose, no oropharyngeal erythema or exudates, and TMs intact bilaterally with no erythema, no cervical tenderness or lymphadenopathy  Cardiovascular: Regular rate and rhythm without appreciable murmur  Respiratory: Clear to auscultation bilaterally no RRW  Gastrointestinal: Soft nontender nondistended with bowel sounds present  extremities: No edema  Neurologic: CN II through XII grossly intact   Psychiatric: Normal mood and affect  Result Review :                 Assessment and Plan    Diagnoses and all orders for this visit:    1. Essential hypertension (Primary)  -     Basic Metabolic Panel    2. Medication monitoring encounter  -     Basic Metabolic Panel    3. Work stress    4. Type 2 diabetes mellitus with hyperglycemia, without long-term current use of insulin (CMS/[facility])    5. Hyperkalemia  -     Basic Metabolic Panel    6. Type 2 diabetes mellitus with diabetic neuropathy, without long-term current use of insulin (CMS/[facility])    7. Irritable bowel syndrome with both constipation and diarrhea    8. Psychophysiological insomnia    9. Nonintractable headache, " unspecified chronicity pattern, unspecified headache type    I suspect a lot of the issues she has been experiencing as of late are secondary to work stress.  She is interested in retiring soon.  We have discussed having her work on a reduced our schedule in hopes that they will find someone to replace her as she states she can no longer do this type of work.  She will come in next week and we discussed doing FMLA paperwork at that time.  I suspect she does have a component of irritable bowel as she does describe having some issues with constipation and diarrhea.  I discussed with her keeping a low FODMAP diet or trying a gluten-free or dairy free diet.  I did discuss with her keeping a diary.  Her insomnia I suspect is secondary to work stress.  We discussed strategies to help improve her sleep.  She is not interested in going on any medication at this time.  Type 2 diabetes is under better control however I suspect the neuropathy she is experiencing in her left foot is secondary to diabetes.  A1c is still not at goal at 7.68%.  It has improved though.  We discussed continue Metformin for now.  As far as her blood pressure is concerned we discussed continue with current management.  She is instructed to continue keeping track of her blood pressure at home and to bring a log on next visit.    Follow Up   Return in about 8 days (around 9/23/2021) for FMLA paperwork.  Patient was given instructions and counseling regarding her condition or for health maintenance advice. Please see specific information pulled into the AVS if appropriate.

## 2021-09-17 ENCOUNTER — LAB (OUTPATIENT)
Dept: FAMILY MEDICINE CLINIC | Facility: CLINIC | Age: 66
End: 2021-09-17

## 2021-09-20 ENCOUNTER — LAB (OUTPATIENT)
Dept: FAMILY MEDICINE CLINIC | Facility: CLINIC | Age: 66
End: 2021-09-20

## 2021-09-20 PROCEDURE — 36415 COLL VENOUS BLD VENIPUNCTURE: CPT | Performed by: FAMILY MEDICINE

## 2021-09-20 PROCEDURE — 80048 BASIC METABOLIC PNL TOTAL CA: CPT | Performed by: FAMILY MEDICINE

## 2021-09-21 LAB
ANION GAP SERPL CALCULATED.3IONS-SCNC: 8.3 MMOL/L (ref 5–15)
BUN SERPL-MCNC: 19 MG/DL (ref 8–23)
BUN/CREAT SERPL: 12.9 (ref 7–25)
CALCIUM SPEC-SCNC: 9.7 MG/DL (ref 8.6–10.5)
CHLORIDE SERPL-SCNC: 96 MMOL/L (ref 98–107)
CO2 SERPL-SCNC: 27.7 MMOL/L (ref 22–29)
CREAT SERPL-MCNC: 1.47 MG/DL (ref 0.57–1)
GFR SERPL CREATININE-BSD FRML MDRD: 36 ML/MIN/1.73
GLUCOSE SERPL-MCNC: 128 MG/DL (ref 65–99)
POTASSIUM SERPL-SCNC: 4.8 MMOL/L (ref 3.5–5.2)
SODIUM SERPL-SCNC: 132 MMOL/L (ref 136–145)

## 2021-09-23 ENCOUNTER — OFFICE VISIT (OUTPATIENT)
Dept: FAMILY MEDICINE CLINIC | Facility: CLINIC | Age: 66
End: 2021-09-23

## 2021-09-23 VITALS
HEIGHT: 64 IN | SYSTOLIC BLOOD PRESSURE: 122 MMHG | BODY MASS INDEX: 29.41 KG/M2 | TEMPERATURE: 98.2 F | HEART RATE: 78 BPM | OXYGEN SATURATION: 100 % | WEIGHT: 172.3 LBS | DIASTOLIC BLOOD PRESSURE: 72 MMHG

## 2021-09-23 DIAGNOSIS — Z56.6 WORK STRESS: ICD-10-CM

## 2021-09-23 DIAGNOSIS — F51.04 PSYCHOPHYSIOLOGICAL INSOMNIA: ICD-10-CM

## 2021-09-23 DIAGNOSIS — Z51.81 MEDICATION MONITORING ENCOUNTER: ICD-10-CM

## 2021-09-23 DIAGNOSIS — I10 ESSENTIAL HYPERTENSION: Primary | ICD-10-CM

## 2021-09-23 DIAGNOSIS — E11.40 TYPE 2 DIABETES MELLITUS WITH DIABETIC NEUROPATHY, WITHOUT LONG-TERM CURRENT USE OF INSULIN (HCC): ICD-10-CM

## 2021-09-23 DIAGNOSIS — E87.1 HYPONATREMIA: ICD-10-CM

## 2021-09-23 DIAGNOSIS — E11.65 TYPE 2 DIABETES MELLITUS WITH HYPERGLYCEMIA, WITHOUT LONG-TERM CURRENT USE OF INSULIN (HCC): ICD-10-CM

## 2021-09-23 PROCEDURE — 99214 OFFICE O/P EST MOD 30 MIN: CPT | Performed by: FAMILY MEDICINE

## 2021-09-23 RX ORDER — HYDROCODONE BITARTRATE AND ACETAMINOPHEN 5; 325 MG/1; MG/1
1 TABLET ORAL SEE ADMIN INSTRUCTIONS
COMMUNITY
Start: 2021-09-16 | End: 2021-10-20

## 2021-09-23 SDOH — HEALTH STABILITY - MENTAL HEALTH: OTHER PHYSICAL AND MENTAL STRAIN RELATED TO WORK: Z56.6

## 2021-09-23 NOTE — PROGRESS NOTES
"Chief Complaint  Work stress  Hypertension  Insomnia    Subjective          Leilani Ordaz presents to [facility]  History of Present Illness  Patient presents today to follow-up for work stress, hypertension, and insomnia.  Blood pressure levels have been running better now.  She does report that at times her blood pressure has been with systolic in the upper 90s.  For the most part it is now in the low 100s.  She denies any more hypertensive levels.  She was recently placed on hydrochlorothiazide 12.5 mg in addition to lisinopril 20 mg.  We discussed continue current management.  She does have type 2 diabetes and her A1c level has been more elevated.  She is working on lifestyle changes including diet and exercise.  Insomnia has improved ever since she had a dental procedure done.  She still has a lot of work-related stress that has made it difficult for her to sleep at times.  She also reports that she is working many more hours than typically planned and is putting in 50 to 60 hours a week.  Her sleep has been disruptive at times.  Objective   Vital Signs:   /72   Pulse 78   Temp 98.2 °F (36.8 °C)   Ht 162.6 cm (64\")   Wt 78.2 kg (172 lb 4.8 oz)   SpO2 100%   BMI 29.58 kg/m²     Physical Exam   General: AAO ×3, no acute distress, pleasant  HEENT: Normocephalic, atraumatic  Cardiovascular: Regular rate and rhythm without appreciable murmur  Respiratory: Clear to auscultation bilaterally no RRW  Gastrointestinal: Soft nontender nondistended with bowel sounds present  extremities: No edema  Neurologic: CN II through XII grossly intact   Psychiatric: Normal mood and affect  Result Review :                 Assessment and Plan    Diagnoses and all orders for this visit:    1. Essential hypertension (Primary)    2. Type 2 diabetes mellitus with hyperglycemia, without long-term current use of insulin ([facility])  -     Comprehensive Metabolic Panel; Future  -     Urinalysis With " Microscopic If Indicated (No Culture) - Urine, Clean Catch; Future    3. Medication monitoring encounter    4. Type 2 diabetes mellitus with diabetic neuropathy, without long-term current use of insulin (HCC)  -     Comprehensive Metabolic Panel; Future  -     Urinalysis With Microscopic If Indicated (No Culture) - Urine, Clean Catch; Future    5. Hyponatremia  -     Comprehensive Metabolic Panel; Future    6. Psychophysiological insomnia    7. Work stress    I discussed with patient continue current management of hypertension as well as diabetes.  She did have some recently noted hyponatremia and her creatinine level is elevated.  I would like to repeat her labs when she returns for follow-up in 1 month.  Discussed checking a urinalysis as well.  Her insomnia I suspect is secondary to work related stress.  We discussed working on a reduced schedule of no more than 20 hours a week.  [facility] paperwork has been filled out on her behalf today.    Follow Up   Return in about 1 month (around 10/23/2021) for hypertension/work stress.  Patient was given instructions and counseling regarding her condition or for health maintenance advice. Please see specific information pulled into the AVS if appropriate.

## 2021-10-14 ENCOUNTER — OFFICE VISIT (OUTPATIENT)
Dept: FAMILY MEDICINE CLINIC | Facility: CLINIC | Age: 66
End: 2021-10-14

## 2021-10-14 VITALS
SYSTOLIC BLOOD PRESSURE: 126 MMHG | TEMPERATURE: 98 F | HEART RATE: 97 BPM | BODY MASS INDEX: 29.94 KG/M2 | OXYGEN SATURATION: 97 % | HEIGHT: 64 IN | WEIGHT: 175.4 LBS | DIASTOLIC BLOOD PRESSURE: 72 MMHG

## 2021-10-14 DIAGNOSIS — R10.32 LEFT LOWER QUADRANT PAIN: ICD-10-CM

## 2021-10-14 DIAGNOSIS — K57.92 DIVERTICULITIS: Primary | ICD-10-CM

## 2021-10-14 PROCEDURE — 99213 OFFICE O/P EST LOW 20 MIN: CPT | Performed by: FAMILY MEDICINE

## 2021-10-14 RX ORDER — LEVOFLOXACIN 750 MG/1
750 TABLET ORAL DAILY
Qty: 10 TABLET | Refills: 0 | Status: SHIPPED | OUTPATIENT
Start: 2021-10-14 | End: 2021-10-20

## 2021-10-14 RX ORDER — METRONIDAZOLE 500 MG/1
500 TABLET ORAL 3 TIMES DAILY
Qty: 30 TABLET | Refills: 0 | Status: SHIPPED | OUTPATIENT
Start: 2021-10-14 | End: 2021-10-20

## 2021-10-14 NOTE — PROGRESS NOTES
"Chief Complaint  Left sided abdominal pain and fever for 2-3 days    Subjective          Leilani Ordaz presents to [facility]  History of Present Illness  Patient presents for an acute visit.  She states that she has been having left-sided abdominal pain for the past 2 to 3 days.  She states she had a little discomfort about a week or so ago however her symptoms became more significant over the weekend.  She started having fever as well.  She reports that her highest temperature was 103.  She reports having chills and sweats as well.  She reports feeling a little bit better today but is still having irregular bowel movements and noticing mucus in her stool.  Her last colonoscopy was in 2018 which showed diverticulosis.  She has previously had diverticulitis.  Objective   Vital Signs:   /72 (BP Location: Right arm, Patient Position: Sitting, Cuff Size: Adult)   Pulse 97   Temp 98 °F (36.7 °C) (Oral)   Ht 162.6 cm (64\")   Wt 79.6 kg (175 lb 6.4 oz)   SpO2 97%   BMI 30.11 kg/m²     Physical Exam   General: AAO ×3, no acute distress, pleasant  HEENT: Normocephalic, atraumatic  Cardiovascular: Regular rate and rhythm without appreciable murmur  Respiratory: Clear to auscultation bilaterally no RRW  Gastrointestinal: Patient has significant tenderness to palpation left lower quadrant as well as left upper quadrant area.  She has some tenderness in the suprapubic area as well.  Bowel sounds are present.  Extremities: No edema  Neurologic: CN II through XII grossly intact   Psychiatric: Normal mood and affect  Result Review :                 Assessment and Plan    Diagnoses and all orders for this visit:    1. Diverticulitis (Primary)    2. Left lower quadrant pain    Other orders  -     metroNIDAZOLE (Flagyl) 500 MG tablet; Take 1 tablet by mouth 3 (Three) Times a Day for 10 days.  Dispense: 30 tablet; Refill: 0  -     levoFLOXacin (Levaquin) 750 MG tablet; Take 1 tablet by " mouth Daily.  Dispense: 10 tablet; Refill: 0    Patient presents with signs and symptoms consistent with diverticulitis.  I will treat her empirically as such.  I will place her on Flagyl and levofloxacin.  I discussed with her proper dosage and.  We discussed 10-day treatment.  If she continues to have symptoms or symptoms worsen she is instructed to call or return.  I discussed with her avoiding intake of alcohol while she is taking metronidazole.  Patient verbalized understanding.    Follow Up   Return in about 19 days (around 11/2/2021), or if symptoms worsen or fail to improve, for hypertension.  Patient was given instructions and counseling regarding her condition or for health maintenance advice. Please see specific information pulled into the AVS if appropriate.

## 2021-10-19 ENCOUNTER — TELEPHONE (OUTPATIENT)
Dept: FAMILY MEDICINE CLINIC | Facility: CLINIC | Age: 66
End: 2021-10-19

## 2021-10-19 NOTE — TELEPHONE ENCOUNTER
Caller: Leilani Ordaz GURWINDER    Relationship: Self    Best call back number: [phone]    What medications are you currently taking:   Current Outpatient Medications on File Prior to Visit   Medication Sig Dispense Refill   • Aspirin Low Dose 81 MG EC tablet Take 81 mg by mouth Daily.     • atorvastatin (LIPITOR) 80 MG tablet Take 80 mg by mouth Daily. for cholesterol     • hydroCHLOROthiazide (HYDRODIURIL) 12.5 MG tablet Take 1 tablet by mouth Daily. 90 tablet 1   • HYDROcodone-acetaminophen (NORCO) 5-325 MG per tablet Take 1 tablet by mouth See Admin Instructions. Take 1 tablet by mouth every 4 to 6 hours as needed for pain     • levoFLOXacin (Levaquin) 750 MG tablet Take 1 tablet by mouth Daily. 10 tablet 0   • lisinopril (PRINIVIL,ZESTRIL) 20 MG tablet Take 20 mg by mouth Daily.     • metFORMIN (GLUCOPHAGE) 1000 MG tablet Take 1,000 mg by mouth 2 (two) times a day.     • metroNIDAZOLE (Flagyl) 500 MG tablet Take 1 tablet by mouth 3 (Three) Times a Day for 10 days. 30 tablet 0     No current facility-administered medications on file prior to visit.          When did you start taking these medications: 10/15/2021    Which medication are you concerned about:   levoFLOXacin (Levaquin) 750 MG tablet  metroNIDAZOLE (Flagyl) 500 MG tablet    Who prescribed you this medication: DAFNE MARTINEZ    What are your concerns: THE PATIENT STATED PCP PRESCRIBED THESE MEDICATIONS TO TREAT HER DIVERTICULITIS BUT SHE BEGAN VOMITING ON 10/15/2021. SHE DISCONTINUED HER MEDICATION ON 10/17/2021 BUT SHE IS STILL VOMITING. SHE STATED THAT AFTER TAKING THE MEDICATION IT WOULD TAKE SEVERAL HOURS BEFORE THE VOMITING STARTED. SHE IS UNSURE IF THE MEDICATION IS CAUSING IT BUT WOULD LIKE A CALL BACK FROM THE NURSE WITH PCP RECOMMENDATION    How long have you had these concerns:  10/15/2021

## 2021-10-20 ENCOUNTER — HOSPITAL ENCOUNTER (OUTPATIENT)
Dept: CT IMAGING | Facility: HOSPITAL | Age: 66
Discharge: HOME | End: 2021-10-20
Admitting: FAMILY MEDICINE

## 2021-10-20 ENCOUNTER — TELEPHONE (OUTPATIENT)
Dept: FAMILY MEDICINE CLINIC | Facility: CLINIC | Age: 66
End: 2021-10-20

## 2021-10-20 ENCOUNTER — OFFICE VISIT (OUTPATIENT)
Dept: FAMILY MEDICINE CLINIC | Facility: CLINIC | Age: 66
End: 2021-10-20

## 2021-10-20 VITALS
SYSTOLIC BLOOD PRESSURE: 114 MMHG | BODY MASS INDEX: 28.51 KG/M2 | OXYGEN SATURATION: 99 % | WEIGHT: 167 LBS | HEIGHT: 64 IN | TEMPERATURE: 98.1 F | DIASTOLIC BLOOD PRESSURE: 62 MMHG | HEART RATE: 82 BPM

## 2021-10-20 DIAGNOSIS — K21.9 GASTROESOPHAGEAL REFLUX DISEASE, UNSPECIFIED WHETHER ESOPHAGITIS PRESENT: ICD-10-CM

## 2021-10-20 DIAGNOSIS — K57.92 DIVERTICULITIS: Primary | ICD-10-CM

## 2021-10-20 DIAGNOSIS — R10.32 LEFT LOWER QUADRANT ABDOMINAL PAIN: ICD-10-CM

## 2021-10-20 DIAGNOSIS — I10 PRIMARY HYPERTENSION: ICD-10-CM

## 2021-10-20 DIAGNOSIS — R07.9 CHEST PAIN, UNSPECIFIED TYPE: ICD-10-CM

## 2021-10-20 DIAGNOSIS — R11.2 NAUSEA AND VOMITING, INTRACTABILITY OF VOMITING NOT SPECIFIED, UNSPECIFIED VOMITING TYPE: ICD-10-CM

## 2021-10-20 PROCEDURE — 99214 OFFICE O/P EST MOD 30 MIN: CPT | Performed by: FAMILY MEDICINE

## 2021-10-20 PROCEDURE — 93000 ELECTROCARDIOGRAM COMPLETE: CPT | Performed by: FAMILY MEDICINE

## 2021-10-20 PROCEDURE — 0 IOPAMIDOL PER 1 ML: Performed by: FAMILY MEDICINE

## 2021-10-20 PROCEDURE — 74177 CT ABD & PELVIS W/CONTRAST: CPT

## 2021-10-20 RX ORDER — OMEPRAZOLE 20 MG/1
20 CAPSULE, DELAYED RELEASE ORAL DAILY
Qty: 30 CAPSULE | Refills: 0 | Status: SHIPPED | OUTPATIENT
Start: 2021-10-20 | End: 2021-11-02 | Stop reason: SDUPTHER

## 2021-10-20 RX ORDER — ONDANSETRON 4 MG/1
4 TABLET, ORALLY DISINTEGRATING ORAL EVERY 8 HOURS PRN
Qty: 30 TABLET | Refills: 1 | Status: SHIPPED | OUTPATIENT
Start: 2021-10-20 | End: 2021-10-20 | Stop reason: SDUPTHER

## 2021-10-20 RX ORDER — ONDANSETRON 4 MG/1
4 TABLET, ORALLY DISINTEGRATING ORAL EVERY 8 HOURS PRN
Qty: 30 TABLET | Refills: 1 | Status: SHIPPED | OUTPATIENT
Start: 2021-10-20 | End: 2022-04-26

## 2021-10-20 RX ADMIN — IOPAMIDOL 100 ML: 755 INJECTION, SOLUTION INTRAVENOUS at 15:32

## 2021-10-20 NOTE — TELEPHONE ENCOUNTER
Called patient back as requested and she stated she hasn't taken the medicine since Monday and is still vomiting. Got patient in for an appointment per dr macias.

## 2021-10-20 NOTE — TELEPHONE ENCOUNTER
Caller: FantaLeilani gee GURWINDER    Relationship: Self    Best call back number: [phone]    Who are you requesting to speak with (clinical staff, provider,  specific staff member): MEDICAL STAFF    What was the call regarding: PATIENT WANTED MEDICAL TEAM TO KNOW THAT SHE HAS NOT TAKEN levoFLOXacin (Levaquin) 750 MG tablet AND metroNIDAZOLE (Flagyl) 500 MG tablet SINCE Monday AND HAS STILL GOTTEN SICK. PLEASE CALL PATIENT TO ADVISE BEFORE SHE GOES AND PICKS UP THE ZOFRAN.

## 2021-10-20 NOTE — TELEPHONE ENCOUNTER
Left detailed message for patient on voicemail. Told patient to call if she has anymore questions or concerns. Let her know that zofran has been sent to pharmacy. HUB PLEASE READ.

## 2021-10-20 NOTE — TELEPHONE ENCOUNTER
I am sorry to hear this.  Vomiting can potentially be a side effect of antibiotics.  Please have her to do a clear liquid diet and advance to bland foods as tolerated.  She needs to finish her antibiotics.  If she continues to have issues please have her come in for an appointment.  I will send in Zofran to her preferred pharmacy[facility].  She can take this every 8 hours as needed for nausea or vomiting.

## 2021-10-20 NOTE — PROGRESS NOTES
"Chief Complaint  Vomiting    Subjective          Leilani Ordaz presents to [facility]  History of Present Illness  Presents today for an acute visit.  I saw her on 10/14/2021.  At that time she was having a 2 to 3-day history of left-sided pain and discomfort.  She reports a temperature at home being 103 degrees.  She reports having chills and sweats as well.  Given the concern of left lower quadrant pain as well as her history of diverticulosis I empirically treated her with metronidazole and levofloxacin.  She reports that her stools have normalized but she is still having some pain and discomfort in the left side of her abdomen.  She reports having chest pressure as well. She does report feeling bloated at that time and feels a gas type pressure in her chest. She states that it happened a couple times where she feels like someone is sitting on her chest.  She states that has happened with exertion.  She reports previously being evaluated in the emergency room for chest pain.  EKG at that time showed sinus rhythm with LVH by voltage criteria.  It was otherwise unremarkable.  She did have troponin checked at that time which was negative.  Her main concern has been vomiting.  She did contact us because she began vomiting on 10/15/2021.  She discontinued her medication around Sunday.  She did take a levofloxacin on Monday but has not taken metronidazole since Sunday.  She has come in today for further evaluation.  Her blood pressure has been lower as well as she has not been eating that well.  Objective   Vital Signs:   /62   Pulse 82   Temp 98.1 °F (36.7 °C)   Ht 162.6 cm (64\")   Wt 75.8 kg (167 lb)   SpO2 99%   BMI 28.67 kg/m²     Physical Exam   General: AAO ×3, no acute distress, pleasant  HEENT: Normocephalic, atraumatic  Cardiovascular: Regular rate and rhythm without appreciable murmur  Respiratory: Clear to auscultation bilaterally no RRW  Musculoskeletal: No " reproducible tenderness to palpation in the costochondral region  Gastrointestinal: Soft nontender nondistended with bowel sounds present  extremities: No edema  Neurologic: CN II through XII grossly intact   Psychiatric: Normal mood and affect  Result Review :            ECG 12 Lead    Date/Time: 10/20/2021 1:39 PM  Performed by: Js Tavares DO  Authorized by: Js Tavares DO   Comparison: compared with previous ECG from 4/26/2021  Similar to previous ECG  Rhythm: sinus rhythm  Rate: normal  T Waves: T waves normal  QRS axis: normal    Clinical impression: non-specific ECG  Comments: EKG shows minimal ST elevation in the inferior leads. I compared this to prior EKG and found no significant change in comparison to prior.               Assessment and Plan    Diagnoses and all orders for this visit:    1. Diverticulitis (Primary)    2. Left lower quadrant abdominal pain  -     CT Abdomen Pelvis With Contrast; Future    3. Chest pain, unspecified type  -     ECG 12 Lead    4. Nausea and vomiting, intractability of vomiting not specified, unspecified vomiting type    5. Primary hypertension    6. Gastroesophageal reflux disease, unspecified whether esophagitis present    Other orders  -     omeprazole (PrilOSEC) 20 MG capsule; Take 1 capsule by mouth Daily.  Dispense: 30 capsule; Refill: 0    Patient presents today for follow-up for diverticulitis. She is still having some left lower quadrant pain. She has had vomiting and I discussed with her holding off on further antibiotics at this time until her CT of the abdomen pelvis returns. She was empirically treated for diverticulitis. Again, discussed getting CT at this time to further evaluate. I suspect the chest pain that she is having is secondary to gastroesophageal reflux or gas present from diverticulitis. I will place her on omeprazole. I would like to see her back for her next regular scheduled appointment or sooner if needed. No significant changes on  EKG today. If she does have any persistent or worsening chest pain she is instructed to go to the emergency room. She has previously had a negative evaluation back in April. Patient verbalized understanding. I also discussed with her holding off on taking hydrochlorothiazide at this time as her blood pressure has been low at times even at 86/52.    Follow Up   Return in about 13 days (around 11/2/2021) for diverticulitis.  Patient was given instructions and counseling regarding her condition or for health maintenance advice. Please see specific information pulled into the AVS if appropriate.

## 2021-10-28 ENCOUNTER — OFFICE VISIT (OUTPATIENT)
Dept: PODIATRY | Facility: CLINIC | Age: 66
End: 2021-10-28

## 2021-10-28 VITALS
TEMPERATURE: 97.3 F | BODY MASS INDEX: 29.53 KG/M2 | SYSTOLIC BLOOD PRESSURE: 143 MMHG | HEIGHT: 64 IN | DIASTOLIC BLOOD PRESSURE: 64 MMHG | OXYGEN SATURATION: 100 % | HEART RATE: 64 BPM | WEIGHT: 173 LBS

## 2021-10-28 DIAGNOSIS — E11.8 DIABETIC FOOT (HCC): Primary | ICD-10-CM

## 2021-10-28 DIAGNOSIS — E11.9 NON-INSULIN DEPENDENT TYPE 2 DIABETES MELLITUS (HCC): ICD-10-CM

## 2021-10-28 PROCEDURE — 99203 OFFICE O/P NEW LOW 30 MIN: CPT | Performed by: PODIATRIST

## 2021-10-28 NOTE — PROGRESS NOTES
[facility] - PODIATRY    Today's Date: 10/28/21    Patient Name: Leilani Ordaz  MRN: [id number]  CSN: [id number]  PCP: Js Tavares DO  Referring Provider: No ref. provider found    SUBJECTIVE     Chief Complaint   Patient presents with   • Left Foot - Establish Care, Annual Exam, Diabetes   • Right Foot - Establish Care, Annual Exam, Diabetes     HPI: Leilani Ordaz, a 66 y.o.female, presents to clinic for a diabetic foot evaluation.    New, Established, New Problem:  new    Location:      Duration:  2011    Onset:  insidious    Nature:  NIDDM    Stable, worsening, improving:  stable    Patient controlling diabetes via:  Oral meds    Patient states they are not required to check blood glucose readings regularly.    Patient denies any fevers, chills, nausea, vomiting, shortness of breath, nor any other constitutional signs nor symptoms.    No other pedal complaints at this time.    Past Medical History:   Diagnosis Date   • Arthritis    • Broken bones    • Cataract    • Diabetes (HCC)    • Diverticulosis    • Esophageal reflux    • Ganglion cyst 12/18/2018   • Hemorrhoids    • HLD (hyperlipidemia)    • HTN (hypertension) 12/18/2018   • Hyperlipemia    • Limb swelling    • Migraine    • Night sweats    • Seasonal allergies    • Seizure (HCC)    • Sinus trouble    • Skin disease    • Type 2 diabetes mellitus with stage 3 chronic kidney disease, without long-term current use of insulin (HCC) 12/18/2018     Past Surgical History:   Procedure Laterality Date   • COLONOSCOPY  2018    FOLLOW UP IN 5 YEARS (2023)     Family History   Problem Relation Age of Onset   • Heart disease Mother    • Diabetes Mother    • Arthritis Mother    • Heart disease Father    • Diabetes Father    • Arthritis Father    • Arthritis Sister    • Heart disease Other    • Diabetes Other    • Heart disease Other    • Diabetes Other    • Heart disease Other    • Diabetes Other    • Diabetes Daughter    • Diabetes Son       Social History     Socioeconomic History   • Marital status:    Tobacco Use   • Smoking status: Never Smoker   • Smokeless tobacco: Never Used   Vaping Use   • Vaping Use: Never used   Substance and Sexual Activity   • Alcohol use: Yes     Comment: RARELY   • Drug use: Not Currently   • Sexual activity: Defer     No Known Allergies  Current Outpatient Medications   Medication Sig Dispense Refill   • Aspirin Low Dose 81 MG EC tablet Take 81 mg by mouth Daily.     • atorvastatin (LIPITOR) 80 MG tablet Take 80 mg by mouth Daily. for cholesterol     • hydroCHLOROthiazide (HYDRODIURIL) 12.5 MG tablet Take 1 tablet by mouth Daily. 90 tablet 1   • lisinopril (PRINIVIL,ZESTRIL) 20 MG tablet Take 20 mg by mouth Daily.     • metFORMIN (GLUCOPHAGE) 1000 MG tablet Take 1,000 mg by mouth 2 (two) times a day.     • omeprazole (PrilOSEC) 20 MG capsule Take 1 capsule by mouth Daily. 30 capsule 0   • ondansetron ODT (Zofran ODT) 4 MG disintegrating tablet Place 1 tablet on the tongue Every 8 (Eight) Hours As Needed for Nausea or Vomiting. 30 tablet 1     No current facility-administered medications for this visit.     Review of Systems   Constitutional: Negative.    All other systems reviewed and are negative.      OBJECTIVE     Vitals:    10/28/21 0823   BP: 143/64   Pulse: 64   Temp: 97.3 °F (36.3 °C)   SpO2: 100%       Body mass index is 29.7 kg/m².    Lab Results   Component Value Date    HGBA1C 7.68 (H) 08/19/2021       Lab Results   Component Value Date    GLUCOSE 128 (H) 09/20/2021    CALCIUM 9.7 09/20/2021     (L) 09/20/2021    K 4.8 09/20/2021    CO2 27.7 09/20/2021    CL 96 (L) 09/20/2021    BUN 19 09/20/2021    CREATININE 1.47 (H) 09/20/2021    EGFRIFNONA 36 (L) 09/20/2021    BCR 12.9 09/20/2021    ANIONGAP 8.3 09/20/2021       Patient seen in no apparent distress.      PHYSICAL EXAM:     Foot/Ankle Exam:       General:   Diabetic Foot Exam Performed    Appearance: appears stated age and healthy     Orientation: AAOx3    Affect: appropriate    Gait: unimpaired    Shoe Gear:  Casual shoes    VASCULAR      Right Foot Vascularity   Normal vascular exam    Dorsalis pedis:  2+  Posterior tibial:  2+  Skin Temperature: warm    Edema Grading:  None  CFT:  < 3 seconds  Pedal Hair Growth:  Present  Varicosities: none       Left Foot Vascularity   Normal vascular exam    Dorsalis pedis:  2+  Posterior tibial:  2+  Skin Temperature: warm    Edema Grading:  None  CFT:  < 3 seconds  Pedal Hair Growth:  Present  Varicosities: none        NEUROLOGIC     Right Foot Neurologic   Normal sensation    Light touch sensation:  Normal  Vibratory sensation:  Normal  Hot/Cold sensation: normal    Protective Sensation using [place] Monofilament:  10     Left Foot Neurologic   Normal sensation    Light touch sensation:  Normal  Vibratory sensation:  Normal  Hot/cold sensation: normal    Protective Sensation using [place] Monofilament:  10     MUSCLE STRENGTH     Right Foot Muscle Strength   Normal strength    Foot dorsiflexion:  5  Foot plantar flexion:  5  Foot inversion:  5  Foot eversion:  5     Left Foot Muscle Strength   Foot dorsiflexion:  5  Foot plantar flexion:  5  Foot inversion:  5  Foot eversion:  5     RANGE OF MOTION      Right Foot Range of Motion   Foot and ankle ROM within normal limits       Left Foot Range of Motion   Foot and ankle ROM within normal limits       DERMATOLOGIC     Right Foot Dermatologic   Skin: skin intact    Nails: normal       Left Foot Dermatologic   Skin: skin intact    Nails: normal        Diabetic Foot Exam Performed      ASSESSMENT/PLAN     Diagnoses and all orders for this visit:    1. Diabetic foot (HCC) (Primary)    2. Non-insulin dependent type 2 diabetes mellitus (HCC)    Medications and allergies reviewed.  Reviewed available blood glucose and HgB A1C lab values along with other pertinent labs.  These were discussed with the patient as to their importance of diabetic  maintenance.    Comprehensive lower extremity examination and evaluation was performed.    Discussed findings and treatment plan including risks, benefits, and treatment options with patient in detail. Patient agreed with treatment plan.    Diabetic foot exam performed and documented this date, compliant with CQM required standards. Detail of findings as noted in physical exam.  Lower extremity Neurologic exam for diabetic patient performed and documented this date, compliant with PQRS required standards. Detail of findings as noted in physical exam.  Advised patient importance of good routine lower extremity hygiene. Advised patient importance of evaluating for intact skin and pain free nail borders.  Advised patient to use mirror to evaluate plantar/ soles of feet for better visualization. Advised patient monitor and phone office to be seen if any cracking to skin, open lesions, painful nail borders or if nails become elongated prior to next visit. Advised patient importance of daily cleansing of lower extremities, followed by good skin cream to maintain normal hydration of skin. Also advised patient importance of close daily monitoring of blood sugar. Advised to regulate diet and medications to maintain control of blood sugar in optimal range. Contact primary care provider if difficulties maintaining blood sugar levels.  Advised Patient of presence of Diabetes Mellitus condition.  Advised Patient risk of progression and worsening or improvement, then return of condition.  Will monitor condition for any change in future. Treat with most appropriate treatment pending status of condition.  Counseled and advised patient extensively on nature and ramifications of diabetes. Standard instructions given to patient for good diabetic foot care and maintenance. Advised importance of careful monitoring to avoid break down and complications secondary to diabetes. Advised patient importance of strict maintenance of blood sugar  control. Advised patient of possible ominous results from neglect of condition, i.e.: amputation/ loss of digits, feet and legs, or even death.  Patient states understands counseling, will monitor closely, continue good hygiene and routine diabetic foot care. Patient will contact office is questions or problems.      An After Visit Summary was printed and given to the patient at discharge, including (if requested) any available informative/educational handouts regarding diagnosis, treatment, or medications. All questions were answered to patient/family satisfaction. Should symptoms fail to improve or worsen they agree to call or return to clinic or to go to the Emergency Department. Discussed the importance of following up with any needed screening tests/labs/specialist appointments and any requested follow-up recommended by me today. Importance of maintaining follow-up discussed and patient accepts that missed appointments can delay diagnosis and potentially lead to worsening of conditions.    Return in about 1 year (around 10/28/2022) for Podiatry Diabetic Foot Exam., or sooner if acute issues arise.    This document has been electronically signed by Dandre Rebolledo DPM on October 28, 2021 09:11 EDT        99.4

## 2021-11-02 ENCOUNTER — OFFICE VISIT (OUTPATIENT)
Dept: FAMILY MEDICINE CLINIC | Facility: CLINIC | Age: 66
End: 2021-11-02

## 2021-11-02 VITALS
WEIGHT: 172.4 LBS | OXYGEN SATURATION: 99 % | SYSTOLIC BLOOD PRESSURE: 138 MMHG | TEMPERATURE: 98.5 F | BODY MASS INDEX: 28.72 KG/M2 | DIASTOLIC BLOOD PRESSURE: 74 MMHG | HEART RATE: 75 BPM | HEIGHT: 65 IN

## 2021-11-02 DIAGNOSIS — Z12.31 VISIT FOR SCREENING MAMMOGRAM: ICD-10-CM

## 2021-11-02 DIAGNOSIS — R11.2 NAUSEA AND VOMITING, INTRACTABILITY OF VOMITING NOT SPECIFIED, UNSPECIFIED VOMITING TYPE: ICD-10-CM

## 2021-11-02 DIAGNOSIS — N17.9 AKI (ACUTE KIDNEY INJURY) (HCC): ICD-10-CM

## 2021-11-02 DIAGNOSIS — K76.0 FATTY LIVER: ICD-10-CM

## 2021-11-02 DIAGNOSIS — K21.9 GASTROESOPHAGEAL REFLUX DISEASE, UNSPECIFIED WHETHER ESOPHAGITIS PRESENT: ICD-10-CM

## 2021-11-02 DIAGNOSIS — E78.2 MIXED HYPERLIPIDEMIA: ICD-10-CM

## 2021-11-02 DIAGNOSIS — K57.90 DIVERTICULOSIS: ICD-10-CM

## 2021-11-02 DIAGNOSIS — N28.1 RENAL CYST, RIGHT: ICD-10-CM

## 2021-11-02 DIAGNOSIS — E11.40 TYPE 2 DIABETES MELLITUS WITH DIABETIC NEUROPATHY, WITHOUT LONG-TERM CURRENT USE OF INSULIN (HCC): ICD-10-CM

## 2021-11-02 DIAGNOSIS — E11.65 TYPE 2 DIABETES MELLITUS WITH HYPERGLYCEMIA, WITHOUT LONG-TERM CURRENT USE OF INSULIN (HCC): ICD-10-CM

## 2021-11-02 DIAGNOSIS — E87.1 HYPONATREMIA: ICD-10-CM

## 2021-11-02 DIAGNOSIS — I10 PRIMARY HYPERTENSION: Primary | ICD-10-CM

## 2021-11-02 DIAGNOSIS — K80.20 CALCULUS OF GALLBLADDER WITHOUT CHOLECYSTITIS WITHOUT OBSTRUCTION: ICD-10-CM

## 2021-11-02 DIAGNOSIS — D21.9 LEIOMYOMA: ICD-10-CM

## 2021-11-02 LAB
ALBUMIN SERPL-MCNC: 4.5 G/DL (ref 3.5–5.2)
ALBUMIN/GLOB SERPL: 1.7 G/DL
ALP SERPL-CCNC: 70 U/L (ref 39–117)
ALT SERPL W P-5'-P-CCNC: 15 U/L (ref 1–33)
ANION GAP SERPL CALCULATED.3IONS-SCNC: 8.3 MMOL/L (ref 5–15)
AST SERPL-CCNC: 17 U/L (ref 1–32)
BILIRUB SERPL-MCNC: 0.7 MG/DL (ref 0–1.2)
BILIRUB UR QL STRIP: NEGATIVE
BUN SERPL-MCNC: 14 MG/DL (ref 8–23)
BUN/CREAT SERPL: 12.2 (ref 7–25)
CALCIUM SPEC-SCNC: 9.7 MG/DL (ref 8.6–10.5)
CHLORIDE SERPL-SCNC: 106 MMOL/L (ref 98–107)
CLARITY UR: ABNORMAL
CO2 SERPL-SCNC: 24.7 MMOL/L (ref 22–29)
COLOR UR: YELLOW
CREAT SERPL-MCNC: 1.15 MG/DL (ref 0.57–1)
GFR SERPL CREATININE-BSD FRML MDRD: 47 ML/MIN/1.73
GLOBULIN UR ELPH-MCNC: 2.7 GM/DL
GLUCOSE SERPL-MCNC: 95 MG/DL (ref 65–99)
GLUCOSE UR STRIP-MCNC: NEGATIVE MG/DL
HGB UR QL STRIP.AUTO: NEGATIVE
KETONES UR QL STRIP: NEGATIVE
LEUKOCYTE ESTERASE UR QL STRIP.AUTO: NEGATIVE
NITRITE UR QL STRIP: NEGATIVE
PH UR STRIP.AUTO: 5.5 [PH] (ref 5–8)
POTASSIUM SERPL-SCNC: 4.4 MMOL/L (ref 3.5–5.2)
PROT SERPL-MCNC: 7.2 G/DL (ref 6–8.5)
PROT UR QL STRIP: NEGATIVE
SODIUM SERPL-SCNC: 139 MMOL/L (ref 136–145)
SP GR UR STRIP: 1.02 (ref 1–1.03)
UROBILINOGEN UR QL STRIP: ABNORMAL

## 2021-11-02 PROCEDURE — 80053 COMPREHEN METABOLIC PANEL: CPT | Performed by: FAMILY MEDICINE

## 2021-11-02 PROCEDURE — 99214 OFFICE O/P EST MOD 30 MIN: CPT | Performed by: FAMILY MEDICINE

## 2021-11-02 PROCEDURE — 81003 URINALYSIS AUTO W/O SCOPE: CPT | Performed by: FAMILY MEDICINE

## 2021-11-02 RX ORDER — LISINOPRIL 20 MG/1
20 TABLET ORAL DAILY
Qty: 90 TABLET | Refills: 3 | Status: SHIPPED | OUTPATIENT
Start: 2021-11-02 | End: 2022-12-01

## 2021-11-02 RX ORDER — ATORVASTATIN CALCIUM 80 MG/1
80 TABLET, FILM COATED ORAL DAILY
Qty: 90 TABLET | Refills: 3 | Status: SHIPPED | OUTPATIENT
Start: 2021-11-02 | End: 2022-11-07

## 2021-11-02 RX ORDER — ASPIRIN 81 MG/1
81 TABLET, COATED ORAL DAILY
Qty: 90 TABLET | Refills: 3 | Status: SHIPPED | OUTPATIENT
Start: 2021-11-02

## 2021-11-02 RX ORDER — OMEPRAZOLE 20 MG/1
20 CAPSULE, DELAYED RELEASE ORAL DAILY
Qty: 90 CAPSULE | Refills: 3 | Status: SHIPPED | OUTPATIENT
Start: 2021-11-02 | End: 2022-04-26

## 2021-11-02 NOTE — PROGRESS NOTES
Chief Complaint  Follow-up, Diabetes, Hypertension, and Med Refill    Subjective          Leilani Ordaz presents to [facility]  History of Present Illness  Patient presents today to follow-up for abdominal pain.  She reports that a few days after she had her CT scan her abdominal symptoms improved.  She is no longer having any abdominal pain nausea or vomiting.  She did have cholelithiasis noted but reports that she rarely has any symptoms from this although sometimes she does have some right upper quadrant pain with spicy foods but denies any recurrent symptoms at this time.  She does have a probable 1.0 cm simple cyst in the right kidney.  We discussed holding off on further evaluation at this time as it is suspected per radiology report to be a simple cyst.  She also has colonic diverticulosis with no evidence of diverticulitis on exam.  Again symptoms have improved.  She was treated with levofloxacin and metronidazole.  She had some side effects from medications and ultimately did not finish the treatment course however no evidence of diverticulitis on CT was done on 10/20/2021.  She is not having any more nausea or vomiting.  She did have some issues with acid reflux that have gotten better taking omeprazole.  She is currently taking omeprazole 20 mg.  She did have lab work that requires repeat.  She did have a slightly low sodium level at 132 with ALIZA noted with the creatinine being 1.47.  I discussed having this lab work repeated today.  Discussed getting a urinalysis as well.  She denies any symptoms of urinary tract infection.  She does need a mammogram as well.  So I discussed with her ordering this as well.  She reports overall doing better.  As far as her blood pressure is concerned I discussed with patient continuing to just take lisinopril at this time.  She reports that after she takes lisinopril her blood pressure will go down in the 110s.  She would like to  "continue to hold off on hydrochlorothiazide at this time.  Her CT also showed leiomyomas.  She is not having any symptoms from this.  Denies any pelvic pain or bleeding.  If symptoms do begin I discussed with her returning for further evaluation and to consider referral to OB/GYN.  At this time she is asymptomatic.  CT also showed a fatty liver.  We discussed lifestyle changes to help improve this.  Objective   Vital Signs:   /74   Pulse 75   Temp 98.5 °F (36.9 °C)   Ht 165.1 cm (65\")   Wt 78.2 kg (172 lb 6.4 oz)   SpO2 99%   BMI 28.69 kg/m²     Physical Exam   General: AAO ×3, no acute distress, pleasant  HEENT: Normocephalic, atraumatic  Cardiovascular: Regular rate and rhythm without appreciable murmur  Respiratory: Clear to auscultation bilaterally no RRW  Gastrointestinal: Soft nontender nondistended with bowel sounds present  extremities: No edema  Neurologic: CN II through XII grossly intact   Psychiatric: Normal mood and affect  Result Review :                 Assessment and Plan    Diagnoses and all orders for this visit:    1. Primary hypertension (Primary)  -     CBC & Differential; Future  -     Comprehensive Metabolic Panel; Future    2. Fatty liver    3. Leiomyoma    4. Calculus of gallbladder without cholecystitis without obstruction    5. Gastroesophageal reflux disease, unspecified whether esophagitis present    6. Nausea and vomiting, intractability of vomiting not specified, unspecified vomiting type    7. ALIZA (acute kidney injury) (HCC)  -     Comprehensive Metabolic Panel; Future    8. Renal cyst, right    9. Diverticulosis    10. Mixed hyperlipidemia  -     Lipid Panel; Future    11. Visit for screening mammogram  -     Mammo Screening Digital Tomosynthesis Bilateral With CAD; Future    12. Type 2 diabetes mellitus with hyperglycemia, without long-term current use of insulin (HCC)    13. Type 2 diabetes mellitus with diabetic neuropathy, without long-term current use of insulin " (HCC)    14. Hyponatremia  -     Comprehensive Metabolic Panel    Other orders  -     Aspirin Low Dose 81 MG EC tablet; Take 1 tablet by mouth Daily.  Dispense: 90 tablet; Refill: 3  -     atorvastatin (LIPITOR) 80 MG tablet; Take 1 tablet by mouth Daily. for cholesterol  Dispense: 90 tablet; Refill: 3  -     lisinopril (PRINIVIL,ZESTRIL) 20 MG tablet; Take 1 tablet by mouth Daily.  Dispense: 90 tablet; Refill: 3  -     metFORMIN (GLUCOPHAGE) 1000 MG tablet; Take 1 tablet by mouth 2 (Two) Times a Day With Meals.  Dispense: 180 tablet; Refill: 3  -     omeprazole (PrilOSEC) 20 MG capsule; Take 1 capsule by mouth Daily.  Dispense: 90 capsule; Refill: 3  -     Urinalysis With Microscopic If Indicated (No Culture) - Urine, Clean Catch  -     Hemoglobin A1c; Future    Patient's abdominal pain has resolved.  I suspect that she had diverticulitis.  No indication for further antibiotics at this time.  Plan as documented above otherwise.  We discussed continue current management of hypertension.  Discussed lifestyle changes to help improve fatty liver.  As far as her leiomyomas are concerned she is asymptomatic at this time.  Discussed monitoring for now.  If she does develop symptoms she is instructed to call or return.  She denies any significant symptoms with cholelithiasis.  Discussed holding off on referral at this time.  She does have gastroesophageal reflux which has improved taking 20 mg of omeprazole daily.  We discussed continue current management.  Labs are due today.  Mammogram has been ordered.  I will see her back in 3 months or sooner if needed.  Patient struck to call with any questions or concerns.    Follow Up   Return in about 3 months (around 2/2/2022) for diabetes, hld.  Patient was given instructions and counseling regarding her condition or for health maintenance advice. Please see specific information pulled into the AVS if appropriate.

## 2021-11-06 DIAGNOSIS — N64.4 PAIN OF BOTH BREASTS: Primary | ICD-10-CM

## 2021-12-01 ENCOUNTER — HOSPITAL ENCOUNTER (OUTPATIENT)
Dept: ULTRASOUND IMAGING | Facility: HOSPITAL | Age: 66
Discharge: HOME | End: 2021-12-01

## 2021-12-01 ENCOUNTER — HOSPITAL ENCOUNTER (OUTPATIENT)
Dept: MAMMOGRAPHY | Facility: HOSPITAL | Age: 66
Discharge: HOME | End: 2021-12-01

## 2021-12-01 DIAGNOSIS — N64.4 PAIN OF BOTH BREASTS: ICD-10-CM

## 2021-12-01 PROCEDURE — 77066 DX MAMMO INCL CAD BI: CPT

## 2021-12-01 PROCEDURE — G0279 TOMOSYNTHESIS, MAMMO: HCPCS

## 2022-04-21 ENCOUNTER — LAB (OUTPATIENT)
Dept: FAMILY MEDICINE CLINIC | Facility: CLINIC | Age: 67
End: 2022-04-21

## 2022-04-21 DIAGNOSIS — I10 PRIMARY HYPERTENSION: ICD-10-CM

## 2022-04-21 DIAGNOSIS — N17.9 AKI (ACUTE KIDNEY INJURY): ICD-10-CM

## 2022-04-21 DIAGNOSIS — E78.2 MIXED HYPERLIPIDEMIA: ICD-10-CM

## 2022-04-21 LAB
ALBUMIN SERPL-MCNC: 4.3 G/DL (ref 3.5–5.2)
ALBUMIN/GLOB SERPL: 1.5 G/DL
ALP SERPL-CCNC: 91 U/L (ref 39–117)
ALT SERPL W P-5'-P-CCNC: 15 U/L (ref 1–33)
ANION GAP SERPL CALCULATED.3IONS-SCNC: 12.6 MMOL/L (ref 5–15)
AST SERPL-CCNC: 17 U/L (ref 1–32)
BASOPHILS # BLD AUTO: 0.05 10*3/MM3 (ref 0–0.2)
BASOPHILS NFR BLD AUTO: 0.5 % (ref 0–1.5)
BILIRUB SERPL-MCNC: 0.6 MG/DL (ref 0–1.2)
BUN SERPL-MCNC: 13 MG/DL (ref 8–23)
BUN/CREAT SERPL: 11.5 (ref 7–25)
CALCIUM SPEC-SCNC: 10.1 MG/DL (ref 8.6–10.5)
CHLORIDE SERPL-SCNC: 104 MMOL/L (ref 98–107)
CHOLEST SERPL-MCNC: 122 MG/DL (ref 0–200)
CO2 SERPL-SCNC: 22.4 MMOL/L (ref 22–29)
CREAT SERPL-MCNC: 1.13 MG/DL (ref 0.57–1)
DEPRECATED RDW RBC AUTO: 39.7 FL (ref 37–54)
EGFRCR SERPLBLD CKD-EPI 2021: 53.8 ML/MIN/1.73
EOSINOPHIL # BLD AUTO: 0.28 10*3/MM3 (ref 0–0.4)
EOSINOPHIL NFR BLD AUTO: 2.9 % (ref 0.3–6.2)
ERYTHROCYTE [DISTWIDTH] IN BLOOD BY AUTOMATED COUNT: 12.5 % (ref 12.3–15.4)
GLOBULIN UR ELPH-MCNC: 2.9 GM/DL
GLUCOSE SERPL-MCNC: 111 MG/DL (ref 65–99)
HBA1C MFR BLD: 6.1 % (ref 4.8–5.6)
HCT VFR BLD AUTO: 41 % (ref 34–46.6)
HDLC SERPL-MCNC: 36 MG/DL (ref 40–60)
HGB BLD-MCNC: 13.6 G/DL (ref 12–15.9)
IMM GRANULOCYTES # BLD AUTO: 0.03 10*3/MM3 (ref 0–0.05)
IMM GRANULOCYTES NFR BLD AUTO: 0.3 % (ref 0–0.5)
LDLC SERPL CALC-MCNC: 64 MG/DL (ref 0–100)
LDLC/HDLC SERPL: 1.71 {RATIO}
LYMPHOCYTES # BLD AUTO: 2.61 10*3/MM3 (ref 0.7–3.1)
LYMPHOCYTES NFR BLD AUTO: 26.7 % (ref 19.6–45.3)
MCH RBC QN AUTO: 29.3 PG (ref 26.6–33)
MCHC RBC AUTO-ENTMCNC: 33.2 G/DL (ref 31.5–35.7)
MCV RBC AUTO: 88.4 FL (ref 79–97)
MONOCYTES # BLD AUTO: 0.64 10*3/MM3 (ref 0.1–0.9)
MONOCYTES NFR BLD AUTO: 6.5 % (ref 5–12)
NEUTROPHILS NFR BLD AUTO: 6.17 10*3/MM3 (ref 1.7–7)
NEUTROPHILS NFR BLD AUTO: 63.1 % (ref 42.7–76)
NRBC BLD AUTO-RTO: 0 /100 WBC (ref 0–0.2)
PLATELET # BLD AUTO: 295 10*3/MM3 (ref 140–450)
PMV BLD AUTO: 11.1 FL (ref 6–12)
POTASSIUM SERPL-SCNC: 5 MMOL/L (ref 3.5–5.2)
PROT SERPL-MCNC: 7.2 G/DL (ref 6–8.5)
RBC # BLD AUTO: 4.64 10*6/MM3 (ref 3.77–5.28)
SODIUM SERPL-SCNC: 139 MMOL/L (ref 136–145)
TRIGL SERPL-MCNC: 122 MG/DL (ref 0–150)
VLDLC SERPL-MCNC: 22 MG/DL (ref 5–40)
WBC NRBC COR # BLD: 9.78 10*3/MM3 (ref 3.4–10.8)

## 2022-04-21 PROCEDURE — 80061 LIPID PANEL: CPT | Performed by: FAMILY MEDICINE

## 2022-04-21 PROCEDURE — 83036 HEMOGLOBIN GLYCOSYLATED A1C: CPT | Performed by: FAMILY MEDICINE

## 2022-04-21 PROCEDURE — 36415 COLL VENOUS BLD VENIPUNCTURE: CPT | Performed by: FAMILY MEDICINE

## 2022-04-21 PROCEDURE — 85025 COMPLETE CBC W/AUTO DIFF WBC: CPT | Performed by: FAMILY MEDICINE

## 2022-04-21 PROCEDURE — 80053 COMPREHEN METABOLIC PANEL: CPT | Performed by: FAMILY MEDICINE

## 2022-04-26 ENCOUNTER — OFFICE VISIT (OUTPATIENT)
Dept: FAMILY MEDICINE CLINIC | Facility: CLINIC | Age: 67
End: 2022-04-26

## 2022-04-26 VITALS
SYSTOLIC BLOOD PRESSURE: 122 MMHG | WEIGHT: 159.9 LBS | TEMPERATURE: 98.1 F | DIASTOLIC BLOOD PRESSURE: 80 MMHG | HEART RATE: 71 BPM | HEIGHT: 64 IN | OXYGEN SATURATION: 98 % | BODY MASS INDEX: 27.3 KG/M2

## 2022-04-26 DIAGNOSIS — I10 ESSENTIAL HYPERTENSION: ICD-10-CM

## 2022-04-26 DIAGNOSIS — E11.65 TYPE 2 DIABETES MELLITUS WITH HYPERGLYCEMIA, WITHOUT LONG-TERM CURRENT USE OF INSULIN: Primary | ICD-10-CM

## 2022-04-26 DIAGNOSIS — N18.31 STAGE 3A CHRONIC KIDNEY DISEASE: ICD-10-CM

## 2022-04-26 DIAGNOSIS — J30.9 ALLERGIC RHINITIS, UNSPECIFIED SEASONALITY, UNSPECIFIED TRIGGER: ICD-10-CM

## 2022-04-26 DIAGNOSIS — E78.2 MIXED HYPERLIPIDEMIA: ICD-10-CM

## 2022-04-26 DIAGNOSIS — Z51.81 MEDICATION MONITORING ENCOUNTER: ICD-10-CM

## 2022-04-26 PROBLEM — Z12.11 COLON CANCER SCREENING: Status: ACTIVE | Noted: 2022-04-26

## 2022-04-26 PROCEDURE — 99214 OFFICE O/P EST MOD 30 MIN: CPT | Performed by: FAMILY MEDICINE

## 2022-04-26 RX ORDER — LORATADINE 10 MG/1
10 TABLET ORAL DAILY
Qty: 90 TABLET | Refills: 3 | Status: SHIPPED | OUTPATIENT
Start: 2022-04-26

## 2022-04-26 NOTE — PROGRESS NOTES
"Chief Complaint  Diabetes  Hypertension    Subjective          Leilani Ordaz presents to [facility]  History of Present Illness  Patient presents today to follow-up for diabetes and hypertension.  Her blood pressure has been adequately controlled taking 20 mg of lisinopril.  Blood pressure has been doing better now that she is retired.  Her depression screening is negative at 0 points.  She did have labs done prior to the appointment which we reviewed today.  She does have a lipid panel that overall looks good with the LDL at 64.  HDL is 36.  I discussed with her exercise to help improve her HDL.  Her A1c is 6.10%.  This is an improvement from the previous 7.68%.  She continues to take metformin 1000 mg twice a day.  We discussed continue current management.  Her CMP shows a creatinine of 1.13.  She does have chronic kidney disease stage IIIa.  Her CBC shows no anemia with normal white blood cell count and platelets.  Allergies have been acting up for the past couple weeks with the spring season.  She is requesting a refill of Claritin.  Objective   Vital Signs:   /80   Pulse 71   Temp 98.1 °F (36.7 °C)   Ht 162.6 cm (64\")   Wt 72.5 kg (159 lb 14.4 oz)   SpO2 98%   BMI 27.45 kg/m²     Physical Exam   General: AAO ×3, no acute distress, pleasant  HEENT: Normocephalic, atraumatic  Cardiovascular: Regular rate and rhythm without appreciable murmur  Respiratory: Clear to auscultation bilaterally no RRW  Gastrointestinal: Soft nontender nondistended with bowel sounds present  extremities: No edema  Neurologic: CN II through XII grossly intact   Psychiatric: Normal mood and affect  Result Review :                 Assessment and Plan    Diagnoses and all orders for this visit:    1. Type 2 diabetes mellitus with hyperglycemia, without long-term current use of insulin (HCC) (Primary)  -     CBC & Differential; Future  -     Comprehensive Metabolic Panel; Future  -     " Hemoglobin A1c; Future  -     Microalbumin / Creatinine Urine Ratio - Urine, Clean Catch; Future    2. Mixed hyperlipidemia  -     Lipid Panel; Future    3. Allergic rhinitis, unspecified seasonality, unspecified trigger    4. Stage 3a chronic kidney disease (HCC)  -     Comprehensive Metabolic Panel; Future    5. Essential hypertension  -     CBC & Differential; Future  -     Comprehensive Metabolic Panel; Future    6. Medication monitoring encounter  -     CBC & Differential; Future  -     Comprehensive Metabolic Panel; Future  -     Hemoglobin A1c; Future  -     Lipid Panel; Future  -     Microalbumin / Creatinine Urine Ratio - Urine, Clean Catch; Future    Other orders  -     loratadine (Claritin) 10 MG tablet; Take 1 tablet by mouth Daily.  Dispense: 90 tablet; Refill: 3    I discussed with patient continue current management for type 2 diabetes.  We discussed exercise to help improve her HDL.  I discussed with patient continue current management for hypertension.  Plan to have labs repeated when she returns for follow-up.  Patient instructed to call with any questions or concerns.           Follow Up   Return in about 6 months (around 10/26/2022) for diabetes.  Patient was given instructions and counseling regarding her condition or for health maintenance advice. Please see specific information pulled into the AVS if appropriate.

## 2022-10-21 ENCOUNTER — LAB (OUTPATIENT)
Dept: FAMILY MEDICINE CLINIC | Facility: CLINIC | Age: 67
End: 2022-10-21

## 2022-10-21 DIAGNOSIS — E11.65 TYPE 2 DIABETES MELLITUS WITH HYPERGLYCEMIA, WITHOUT LONG-TERM CURRENT USE OF INSULIN: ICD-10-CM

## 2022-10-21 DIAGNOSIS — Z51.81 MEDICATION MONITORING ENCOUNTER: ICD-10-CM

## 2022-10-21 DIAGNOSIS — E78.2 MIXED HYPERLIPIDEMIA: ICD-10-CM

## 2022-10-21 DIAGNOSIS — N18.31 STAGE 3A CHRONIC KIDNEY DISEASE: ICD-10-CM

## 2022-10-21 DIAGNOSIS — I10 ESSENTIAL HYPERTENSION: ICD-10-CM

## 2022-10-21 LAB
ALBUMIN SERPL-MCNC: 4.4 G/DL (ref 3.5–5.2)
ALBUMIN/GLOB SERPL: 1.8 G/DL
ALP SERPL-CCNC: 83 U/L (ref 39–117)
ALT SERPL W P-5'-P-CCNC: 20 U/L (ref 1–33)
ANION GAP SERPL CALCULATED.3IONS-SCNC: 11.2 MMOL/L (ref 5–15)
AST SERPL-CCNC: 25 U/L (ref 1–32)
BASOPHILS # BLD AUTO: 0.05 10*3/MM3 (ref 0–0.2)
BASOPHILS NFR BLD AUTO: 0.6 % (ref 0–1.5)
BILIRUB SERPL-MCNC: 0.5 MG/DL (ref 0–1.2)
BUN SERPL-MCNC: 12 MG/DL (ref 8–23)
BUN/CREAT SERPL: 11.7 (ref 7–25)
CALCIUM SPEC-SCNC: 9.9 MG/DL (ref 8.6–10.5)
CHLORIDE SERPL-SCNC: 106 MMOL/L (ref 98–107)
CHOLEST SERPL-MCNC: 153 MG/DL (ref 0–200)
CO2 SERPL-SCNC: 25.8 MMOL/L (ref 22–29)
CREAT SERPL-MCNC: 1.03 MG/DL (ref 0.57–1)
DEPRECATED RDW RBC AUTO: 37.6 FL (ref 37–54)
EGFRCR SERPLBLD CKD-EPI 2021: 59.7 ML/MIN/1.73
EOSINOPHIL # BLD AUTO: 0.2 10*3/MM3 (ref 0–0.4)
EOSINOPHIL NFR BLD AUTO: 2.3 % (ref 0.3–6.2)
ERYTHROCYTE [DISTWIDTH] IN BLOOD BY AUTOMATED COUNT: 12.1 % (ref 12.3–15.4)
GLOBULIN UR ELPH-MCNC: 2.5 GM/DL
GLUCOSE SERPL-MCNC: 108 MG/DL (ref 65–99)
HCT VFR BLD AUTO: 40 % (ref 34–46.6)
HDLC SERPL-MCNC: 43 MG/DL (ref 40–60)
HGB BLD-MCNC: 13.6 G/DL (ref 12–15.9)
IMM GRANULOCYTES # BLD AUTO: 0.03 10*3/MM3 (ref 0–0.05)
IMM GRANULOCYTES NFR BLD AUTO: 0.3 % (ref 0–0.5)
LDLC SERPL CALC-MCNC: 90 MG/DL (ref 0–100)
LDLC/HDLC SERPL: 2.05 {RATIO}
LYMPHOCYTES # BLD AUTO: 3.12 10*3/MM3 (ref 0.7–3.1)
LYMPHOCYTES NFR BLD AUTO: 35.9 % (ref 19.6–45.3)
MCH RBC QN AUTO: 28.9 PG (ref 26.6–33)
MCHC RBC AUTO-ENTMCNC: 34 G/DL (ref 31.5–35.7)
MCV RBC AUTO: 85.1 FL (ref 79–97)
MONOCYTES # BLD AUTO: 0.61 10*3/MM3 (ref 0.1–0.9)
MONOCYTES NFR BLD AUTO: 7 % (ref 5–12)
NEUTROPHILS NFR BLD AUTO: 4.68 10*3/MM3 (ref 1.7–7)
NEUTROPHILS NFR BLD AUTO: 53.9 % (ref 42.7–76)
NRBC BLD AUTO-RTO: 0 /100 WBC (ref 0–0.2)
PLATELET # BLD AUTO: 265 10*3/MM3 (ref 140–450)
PMV BLD AUTO: 11 FL (ref 6–12)
POTASSIUM SERPL-SCNC: 4.7 MMOL/L (ref 3.5–5.2)
PROT SERPL-MCNC: 6.9 G/DL (ref 6–8.5)
RBC # BLD AUTO: 4.7 10*6/MM3 (ref 3.77–5.28)
SODIUM SERPL-SCNC: 143 MMOL/L (ref 136–145)
TRIGL SERPL-MCNC: 110 MG/DL (ref 0–150)
VLDLC SERPL-MCNC: 20 MG/DL (ref 5–40)
WBC NRBC COR # BLD: 8.69 10*3/MM3 (ref 3.4–10.8)

## 2022-10-21 PROCEDURE — 83036 HEMOGLOBIN GLYCOSYLATED A1C: CPT | Performed by: FAMILY MEDICINE

## 2022-10-21 PROCEDURE — 80053 COMPREHEN METABOLIC PANEL: CPT | Performed by: FAMILY MEDICINE

## 2022-10-21 PROCEDURE — 80061 LIPID PANEL: CPT | Performed by: FAMILY MEDICINE

## 2022-10-21 PROCEDURE — 85025 COMPLETE CBC W/AUTO DIFF WBC: CPT | Performed by: FAMILY MEDICINE

## 2022-10-21 PROCEDURE — 82570 ASSAY OF URINE CREATININE: CPT | Performed by: FAMILY MEDICINE

## 2022-10-21 PROCEDURE — 82043 UR ALBUMIN QUANTITATIVE: CPT | Performed by: FAMILY MEDICINE

## 2022-10-21 PROCEDURE — 36415 COLL VENOUS BLD VENIPUNCTURE: CPT | Performed by: NURSE PRACTITIONER

## 2022-10-22 LAB
ALBUMIN UR-MCNC: <1.2 MG/DL
CREAT UR-MCNC: 117.1 MG/DL
HBA1C MFR BLD: 6.4 % (ref 4.8–5.6)
MICROALBUMIN/CREAT UR: NORMAL MG/G{CREAT}

## 2022-10-24 ENCOUNTER — OFFICE VISIT (OUTPATIENT)
Dept: FAMILY MEDICINE CLINIC | Facility: CLINIC | Age: 67
End: 2022-10-24

## 2022-10-24 VITALS
HEIGHT: 64 IN | OXYGEN SATURATION: 97 % | BODY MASS INDEX: 27.5 KG/M2 | DIASTOLIC BLOOD PRESSURE: 80 MMHG | SYSTOLIC BLOOD PRESSURE: 146 MMHG | HEART RATE: 83 BPM | WEIGHT: 161.1 LBS | TEMPERATURE: 98 F

## 2022-10-24 DIAGNOSIS — E11.65 TYPE 2 DIABETES MELLITUS WITH HYPERGLYCEMIA, WITHOUT LONG-TERM CURRENT USE OF INSULIN: ICD-10-CM

## 2022-10-24 DIAGNOSIS — I10 ESSENTIAL HYPERTENSION: Primary | ICD-10-CM

## 2022-10-24 DIAGNOSIS — R10.30 LOWER ABDOMINAL PAIN: ICD-10-CM

## 2022-10-24 DIAGNOSIS — E78.2 MIXED HYPERLIPIDEMIA: ICD-10-CM

## 2022-10-24 DIAGNOSIS — Z51.81 MEDICATION MONITORING ENCOUNTER: ICD-10-CM

## 2022-10-24 DIAGNOSIS — N18.31 STAGE 3A CHRONIC KIDNEY DISEASE: ICD-10-CM

## 2022-10-24 DIAGNOSIS — Z12.31 VISIT FOR SCREENING MAMMOGRAM: ICD-10-CM

## 2022-10-24 PROCEDURE — 99214 OFFICE O/P EST MOD 30 MIN: CPT | Performed by: FAMILY MEDICINE

## 2022-10-24 NOTE — PROGRESS NOTES
"Chief Complaint  Hypertension  Type II diabetes    Subjective        Leilani Ordaz presents to [facility]  History of Present Illness  Patient presents today to follow-up for diabetes and hypertension.  Her blood pressure has been adequately controlled with systolics at home running in the 120s.  She is more elevated today at 146/80 but admits to being more nervous when coming in to be seen.  She is currently taking lisinopril 20 mg daily.  She is taking metformin 1000 mg twice daily for type 2 diabetes.  Her last A1c was on 10/21/2022 which was 6.4%.  Lipid panel done recently showed her LDL at 90 with HDL of 43 and triglycerides at 110.  She does have borderline chronic kidney disease stage IIIa.  We discussed continuing with lisinopril and staying well-hydrated.  She will be due for mammogram in December.  Denies any breast complaints today.  She is up-to-date on her colonoscopy and will need a follow-up in July 2023.  She had some mild abdominal pain last night.  She was bowling yesterday.  She may have strained a muscle.  She has previously had diverticulitis.  Describes the pain in the lower abdomen bilaterally.  Objective   Vital Signs:  /80   Pulse 83   Temp 98 °F (36.7 °C)   Ht 162.6 cm (64\")   Wt 73.1 kg (161 lb 1.6 oz)   SpO2 97%   BMI 27.65 kg/m²   Estimated body mass index is 27.65 kg/m² as calculated from the following:    Height as of this encounter: 162.6 cm (64\").    Weight as of this encounter: 73.1 kg (161 lb 1.6 oz).          Physical Exam   General: AAO ×3, no acute distress, pleasant  HEENT: Normocephalic, atraumatic  Cardiovascular: Regular rate and rhythm without appreciable murmur  Respiratory: Clear to auscultation bilaterally no RRW  Gastrointestinal: Soft nontender nondistended with bowel sounds present  extremities: No edema  Neurologic: CN II through XII grossly intact   Psychiatric: Normal mood and affect  Result Review :              "   Assessment and Plan   Diagnoses and all orders for this visit:    1. Essential hypertension (Primary)  -     CBC & Differential; Future  -     Comprehensive Metabolic Panel; Future    2. Type 2 diabetes mellitus with hyperglycemia, without long-term current use of insulin (HCC)  -     CBC & Differential; Future  -     Comprehensive Metabolic Panel; Future  -     Hemoglobin A1c; Future    3. Medication monitoring encounter  -     CBC & Differential; Future  -     Comprehensive Metabolic Panel; Future  -     Hemoglobin A1c; Future  -     Lipid Panel; Future  -     Urinalysis With Microscopic If Indicated (No Culture) - Urine, Clean Catch; Future    4. Mixed hyperlipidemia  -     Lipid Panel; Future    5. Stage 3a chronic kidney disease (HCC)  -     Urinalysis With Microscopic If Indicated (No Culture) - Urine, Clean Catch; Future    6. Visit for screening mammogram  -     Mammo Screening Digital Tomosynthesis Bilateral With CAD; Future    7. Lower abdominal pain    I discussed with patient continue current management for hypertension and type 2 diabetes.  We discussed continuing to take metformin 1000 mg twice daily.  We discussed continuing atorvastatin 80 mg daily.  She does have some mild generalized lower abdominal pain today.  She reports stooling normally.  I discussed with her staying well-hydrated and doing a clear liquid diet.  She has previously had diverticulitis.  She denies any fever or chills.  Should symptoms worsen she is instructed to call or return.         Follow Up   Return in about 6 months (around 4/24/2023) for diabetes.  Patient was given instructions and counseling regarding her condition or for health maintenance advice. Please see specific information pulled into the AVS if appropriate.

## 2022-10-28 ENCOUNTER — OFFICE VISIT (OUTPATIENT)
Dept: PODIATRY | Facility: CLINIC | Age: 67
End: 2022-10-28

## 2022-10-28 VITALS
HEART RATE: 79 BPM | HEIGHT: 64 IN | WEIGHT: 168 LBS | BODY MASS INDEX: 28.68 KG/M2 | DIASTOLIC BLOOD PRESSURE: 74 MMHG | TEMPERATURE: 96.9 F | SYSTOLIC BLOOD PRESSURE: 145 MMHG | OXYGEN SATURATION: 100 %

## 2022-10-28 DIAGNOSIS — E11.9 NON-INSULIN DEPENDENT TYPE 2 DIABETES MELLITUS: ICD-10-CM

## 2022-10-28 DIAGNOSIS — E11.8 DIABETIC FOOT: Primary | ICD-10-CM

## 2022-10-28 PROCEDURE — G8404 LOW EXTEMITY NEUR EXAM DOCUM: HCPCS | Performed by: PODIATRIST

## 2022-10-28 PROCEDURE — 99213 OFFICE O/P EST LOW 20 MIN: CPT | Performed by: PODIATRIST

## 2022-10-28 NOTE — PROGRESS NOTES
[facility] - PODIATRY    Today's Date: 10/28/22    Patient Name: Leilani Ordaz  MRN: [id number]  CSN: [id number]  PCP: Js Tavares DO, Last PCP Visit: 24 October 2022  Referring Provider: No ref. provider found    SUBJECTIVE     Chief Complaint   Patient presents with   • Left Foot - Follow-up, Annual Exam, Diabetes, Numbness     Shooting pains in toes sometimes    • Right Foot - Follow-up, Annual Exam, Diabetes, Numbness     HPI: Leilani Ordaz, a 67 y.o.female, presents to clinic for a diabetic foot evaluation.    New, Established, New Problem: Established    Duration:  2011    Onset:  insidious    Nature:  NIDDM    Stable, worsening, improving:  stable    Patient controlling diabetes via:  Oral meds    Patient states that they are not required to check her blood sugars regularly.    Patient denies any fevers, chills, nausea, vomiting, shortness of breath, nor any other constitutional signs nor symptoms.    Medical changes:  none.    Past Medical History:   Diagnosis Date   • Arthritis    • Broken bones    • Cataract    • Diabetes ([facility])    • Diverticulosis    • Esophageal reflux    • Ganglion cyst 12/18/2018   • Hemorrhoids    • HLD (hyperlipidemia)    • HTN (hypertension) 12/18/2018   • Hyperlipemia    • Limb swelling    • Migraine    • Neuropathy in diabetes ([facility]) 07/01/2019    OR SOONER   • Night sweats    • Seasonal allergies    • Seizure ([facility])    • Sinus trouble    • Skin disease    • Type 2 diabetes mellitus with stage 3 chronic kidney disease, without long-term current use of insulin ([facility]) 12/18/2018     Past Surgical History:   Procedure Laterality Date   • COLONOSCOPY  2018    FOLLOW UP IN 5 YEARS (2023)     Family History   Problem Relation Age of Onset   • Heart disease Mother    • Diabetes Mother    • Arthritis Mother    • Heart disease Father    • Diabetes Father    • Arthritis Father    • Rashes / Skin problems Father    • Arthritis Sister    • Heart disease Other    •  Diabetes Other    • Heart disease Other    • Diabetes Other    • Heart disease Other    • Diabetes Other    • Diabetes Daughter    • Diabetes Son      Social History     Socioeconomic History   • Marital status:    Tobacco Use   • Smoking status: Never   • Smokeless tobacco: Never   Vaping Use   • Vaping Use: Never used   Substance and Sexual Activity   • Alcohol use: Yes     Comment: LIQUOR BUT VERY RARELY   • Drug use: Not Currently   • Sexual activity: Yes     Partners: Female     Birth control/protection: None, Same-sex partner     No Known Allergies  Current Outpatient Medications   Medication Sig Dispense Refill   • Aspirin Low Dose 81 MG EC tablet Take 1 tablet by mouth Daily. 90 tablet 3   • atorvastatin (LIPITOR) 80 MG tablet Take 1 tablet by mouth Daily. for cholesterol 90 tablet 3   • lisinopril (PRINIVIL,ZESTRIL) 20 MG tablet Take 1 tablet by mouth Daily. 90 tablet 3   • loratadine (Claritin) 10 MG tablet Take 1 tablet by mouth Daily. 90 tablet 3   • metFORMIN (GLUCOPHAGE) 1000 MG tablet Take 1 tablet by mouth 2 (Two) Times a Day With Meals. 180 tablet 3     No current facility-administered medications for this visit.     Review of Systems   Constitutional: Negative.    Neurological: Positive for numbness.   All other systems reviewed and are negative.      OBJECTIVE     Vitals:    10/28/22 0918   BP: 145/74   Pulse: 79   Temp: 96.9 °F (36.1 °C)   SpO2: 100%       Body mass index is 28.84 kg/m².    Lab Results   Component Value Date    HGBA1C 6.40 (H) 10/21/2022       Lab Results   Component Value Date    GLUCOSE 108 (H) 10/21/2022    CALCIUM 9.9 10/21/2022     10/21/2022    K 4.7 10/21/2022    CO2 25.8 10/21/2022     10/21/2022    BUN 12 10/21/2022    CREATININE 1.03 (H) 10/21/2022    EGFRIFNONA 47 (L) 11/02/2021    BCR 11.7 10/21/2022    ANIONGAP 11.2 10/21/2022       Patient seen in no apparent distress.      PHYSICAL EXAM:     Foot/Ankle Exam:       General:   Appearance: appears  stated age and healthy and elderly    Orientation: AAOx3    Affect: appropriate    Gait: unimpaired    Shoe Gear:  Casual shoes    VASCULAR      Right Foot Vascularity   Normal vascular exam    Dorsalis pedis:  2+  Posterior tibial:  2+  Skin Temperature: warm    Edema Grading:  None  CFT:  < 3 seconds  Pedal Hair Growth:  Absent  Varicosities: mild varicosities       Left Foot Vascularity   Normal vascular exam    Dorsalis pedis:  2+  Posterior tibial:  2+  Skin Temperature: warm    Edema Grading:  None  CFT:  < 3 seconds  Pedal Hair Growth:  Absent  Varicosities: mild varicosities        NEUROLOGIC     Right Foot Neurologic   Light touch sensation:  Diminished  Vibratory sensation:  Diminished  Hot/Cold sensation: diminished    Protective Sensation using [employer] Monofilament:  6     Left Foot Neurologic   Light touch sensation:  Diminished  Vibratory sensation:  Diminished  Hot/cold sensation: diminished    Protective Sensation using [employer] Monofilament:  6     MUSCLE STRENGTH     Right Foot Muscle Strength   Foot dorsiflexion:  4  Foot plantar flexion:  4  Foot inversion:  4  Foot eversion:  4     Left Foot Muscle Strength   Foot dorsiflexion:  4  Foot plantar flexion:  4  Foot inversion:  4  Foot eversion:  4     RANGE OF MOTION      Right Foot Range of Motion   Foot and ankle ROM within normal limits       Left Foot Range of Motion   Foot and ankle ROM within normal limits       DERMATOLOGIC     Right Foot Dermatologic   Skin: skin intact    Nails: normal       Left Foot Dermatologic   Skin: skin intact    Nails: normal        Diabetic Foot Exam Performed      ASSESSMENT/PLAN     Diagnoses and all orders for this visit:    1. Diabetic foot (HCC) (Primary)    2. Non-insulin dependent type 2 diabetes mellitus (HCC)    Medications and allergies reviewed.  Reviewed available blood glucose and HgB A1C lab values along with other pertinent labs.  These were discussed with the patient as to their  importance of diabetic maintenance.    Comprehensive lower extremity examination and evaluation was performed.    Discussed findings and treatment plan including risks, benefits, and treatment options with patient in detail. Patient agreed with treatment plan.    Diabetic foot exam performed and documented this date, compliant with CQM required standards. Detail of findings as noted in physical exam.  Lower extremity Neurologic exam for diabetic patient performed and documented this date, compliant with PQRS required standards. Detail of findings as noted in physical exam.  Advised patient importance of good routine lower extremity hygiene. Advised patient importance of evaluating for intact skin and pain free nail borders.  Advised patient to use mirror to evaluate plantar/ soles of feet for better visualization. Advised patient monitor and phone office to be seen if any cracking to skin, open lesions, painful nail borders or if nails become elongated prior to next visit. Advised patient importance of daily cleansing of lower extremities, followed by good skin cream to maintain normal hydration of skin. Also advised patient importance of close daily monitoring of blood sugar. Advised to regulate diet and medications to maintain control of blood sugar in optimal range. Contact primary care provider if difficulties maintaining blood sugar levels.  Advised Patient of presence of Diabetes Mellitus condition.  Advised Patient risk of progression and worsening or improvement, then return of condition.  Will monitor condition for any change in future. Treat with most appropriate treatment pending status of condition.  Counseled and advised patient extensively on nature and ramifications of diabetes. Standard instructions given to patient for good diabetic foot care and maintenance. Advised importance of careful monitoring to avoid break down and complications secondary to diabetes. Advised patient importance of strict  maintenance of blood sugar control. Advised patient of possible ominous results from neglect of condition, i.e.: amputation/ loss of digits, feet and legs, or even death.  Patient states understands counseling, will monitor closely, continue good hygiene and routine diabetic foot care. Patient will contact office is questions or problems.      An After Visit Summary was printed and given to the patient at discharge, including (if requested) any available informative/educational handouts regarding diagnosis, treatment, or medications. All questions were answered to patient/family satisfaction. Should symptoms fail to improve or worsen they agree to call or return to clinic or to go to the Emergency Department. Discussed the importance of following up with any needed screening tests/labs/specialist appointments and any requested follow-up recommended by me today. Importance of maintaining follow-up discussed and patient accepts that missed appointments can delay diagnosis and potentially lead to worsening of conditions.    Return in about 1 year (around 10/28/2023) for Podiatry Diabetic Foot Exam., or sooner if acute issues arise.    This document has been electronically signed by Dandre Rebolledo DPM on October 28, 2022 09:58 EDT

## 2022-11-07 RX ORDER — ATORVASTATIN CALCIUM 80 MG/1
TABLET, FILM COATED ORAL
Qty: 90 TABLET | Refills: 3 | Status: SHIPPED | OUTPATIENT
Start: 2022-11-07

## 2022-12-01 RX ORDER — LISINOPRIL 20 MG/1
20 TABLET ORAL DAILY
Qty: 90 TABLET | Refills: 3 | Status: SHIPPED | OUTPATIENT
Start: 2022-12-01

## 2023-04-13 ENCOUNTER — HOSPITAL ENCOUNTER (OUTPATIENT)
Dept: MAMMOGRAPHY | Facility: HOSPITAL | Age: 68
Discharge: HOME | End: 2023-04-13
Admitting: FAMILY MEDICINE
Payer: MEDICARE

## 2023-04-13 DIAGNOSIS — Z12.31 VISIT FOR SCREENING MAMMOGRAM: ICD-10-CM

## 2023-04-13 PROCEDURE — 77063 BREAST TOMOSYNTHESIS BI: CPT

## 2023-04-13 PROCEDURE — 77067 SCR MAMMO BI INCL CAD: CPT

## 2023-04-20 ENCOUNTER — CLINICAL SUPPORT (OUTPATIENT)
Dept: FAMILY MEDICINE CLINIC | Facility: CLINIC | Age: 68
End: 2023-04-20
Payer: MEDICARE

## 2023-04-20 DIAGNOSIS — I10 ESSENTIAL HYPERTENSION: ICD-10-CM

## 2023-04-20 DIAGNOSIS — E11.65 TYPE 2 DIABETES MELLITUS WITH HYPERGLYCEMIA, WITHOUT LONG-TERM CURRENT USE OF INSULIN: ICD-10-CM

## 2023-04-20 DIAGNOSIS — N18.31 STAGE 3A CHRONIC KIDNEY DISEASE: ICD-10-CM

## 2023-04-20 DIAGNOSIS — E78.2 MIXED HYPERLIPIDEMIA: ICD-10-CM

## 2023-04-20 DIAGNOSIS — Z51.81 MEDICATION MONITORING ENCOUNTER: ICD-10-CM

## 2023-04-20 LAB
ALBUMIN SERPL-MCNC: 4.4 G/DL (ref 3.5–5.2)
ALBUMIN/GLOB SERPL: 1.8 G/DL
ALP SERPL-CCNC: 78 U/L (ref 39–117)
ALT SERPL W P-5'-P-CCNC: 14 U/L (ref 1–33)
ANION GAP SERPL CALCULATED.3IONS-SCNC: 10 MMOL/L (ref 5–15)
AST SERPL-CCNC: 23 U/L (ref 1–32)
BACTERIA UR QL AUTO: ABNORMAL /HPF
BASOPHILS # BLD AUTO: 0.04 10*3/MM3 (ref 0–0.2)
BASOPHILS NFR BLD AUTO: 0.5 % (ref 0–1.5)
BILIRUB SERPL-MCNC: 0.6 MG/DL (ref 0–1.2)
BILIRUB UR QL STRIP: NEGATIVE
BUN SERPL-MCNC: 16 MG/DL (ref 8–23)
BUN/CREAT SERPL: 12.5 (ref 7–25)
CALCIUM SPEC-SCNC: 9.9 MG/DL (ref 8.6–10.5)
CHLORIDE SERPL-SCNC: 106 MMOL/L (ref 98–107)
CHOLEST SERPL-MCNC: 123 MG/DL (ref 0–200)
CLARITY UR: ABNORMAL
CO2 SERPL-SCNC: 25 MMOL/L (ref 22–29)
COLOR UR: YELLOW
CREAT SERPL-MCNC: 1.28 MG/DL (ref 0.57–1)
DEPRECATED RDW RBC AUTO: 37.4 FL (ref 37–54)
EGFRCR SERPLBLD CKD-EPI 2021: 46 ML/MIN/1.73
EOSINOPHIL # BLD AUTO: 0.17 10*3/MM3 (ref 0–0.4)
EOSINOPHIL NFR BLD AUTO: 2 % (ref 0.3–6.2)
ERYTHROCYTE [DISTWIDTH] IN BLOOD BY AUTOMATED COUNT: 12.2 % (ref 12.3–15.4)
GLOBULIN UR ELPH-MCNC: 2.5 GM/DL
GLUCOSE SERPL-MCNC: 93 MG/DL (ref 65–99)
GLUCOSE UR STRIP-MCNC: NEGATIVE MG/DL
HBA1C MFR BLD: 6.3 % (ref 4.8–5.6)
HCT VFR BLD AUTO: 40.2 % (ref 34–46.6)
HDLC SERPL-MCNC: 34 MG/DL (ref 40–60)
HGB BLD-MCNC: 13.5 G/DL (ref 12–15.9)
HGB UR QL STRIP.AUTO: NEGATIVE
HYALINE CASTS UR QL AUTO: ABNORMAL /LPF
IMM GRANULOCYTES # BLD AUTO: 0.04 10*3/MM3 (ref 0–0.05)
IMM GRANULOCYTES NFR BLD AUTO: 0.5 % (ref 0–0.5)
KETONES UR QL STRIP: ABNORMAL
LDLC SERPL CALC-MCNC: 70 MG/DL (ref 0–100)
LDLC/HDLC SERPL: 2.03 {RATIO}
LEUKOCYTE ESTERASE UR QL STRIP.AUTO: ABNORMAL
LYMPHOCYTES # BLD AUTO: 3.2 10*3/MM3 (ref 0.7–3.1)
LYMPHOCYTES NFR BLD AUTO: 37.3 % (ref 19.6–45.3)
MCH RBC QN AUTO: 28.5 PG (ref 26.6–33)
MCHC RBC AUTO-ENTMCNC: 33.6 G/DL (ref 31.5–35.7)
MCV RBC AUTO: 84.8 FL (ref 79–97)
MONOCYTES # BLD AUTO: 0.6 10*3/MM3 (ref 0.1–0.9)
MONOCYTES NFR BLD AUTO: 7 % (ref 5–12)
NEUTROPHILS NFR BLD AUTO: 4.52 10*3/MM3 (ref 1.7–7)
NEUTROPHILS NFR BLD AUTO: 52.7 % (ref 42.7–76)
NITRITE UR QL STRIP: NEGATIVE
NRBC BLD AUTO-RTO: 0 /100 WBC (ref 0–0.2)
PH UR STRIP.AUTO: 5.5 [PH] (ref 5–8)
PLATELET # BLD AUTO: 272 10*3/MM3 (ref 140–450)
PMV BLD AUTO: 10.6 FL (ref 6–12)
POTASSIUM SERPL-SCNC: 4.7 MMOL/L (ref 3.5–5.2)
PROT SERPL-MCNC: 6.9 G/DL (ref 6–8.5)
PROT UR QL STRIP: ABNORMAL
RBC # BLD AUTO: 4.74 10*6/MM3 (ref 3.77–5.28)
RBC # UR STRIP: ABNORMAL /HPF
REF LAB TEST METHOD: ABNORMAL
SODIUM SERPL-SCNC: 141 MMOL/L (ref 136–145)
SP GR UR STRIP: 1.03 (ref 1–1.03)
SQUAMOUS #/AREA URNS HPF: ABNORMAL /HPF
TRIGL SERPL-MCNC: 100 MG/DL (ref 0–150)
UROBILINOGEN UR QL STRIP: ABNORMAL
VLDLC SERPL-MCNC: 19 MG/DL (ref 5–40)
WBC # UR STRIP: ABNORMAL /HPF
WBC NRBC COR # BLD: 8.57 10*3/MM3 (ref 3.4–10.8)

## 2023-04-20 PROCEDURE — 80061 LIPID PANEL: CPT | Performed by: FAMILY MEDICINE

## 2023-04-20 PROCEDURE — 80053 COMPREHEN METABOLIC PANEL: CPT | Performed by: FAMILY MEDICINE

## 2023-04-20 PROCEDURE — 85025 COMPLETE CBC W/AUTO DIFF WBC: CPT | Performed by: FAMILY MEDICINE

## 2023-04-20 PROCEDURE — 83036 HEMOGLOBIN GLYCOSYLATED A1C: CPT | Performed by: FAMILY MEDICINE

## 2023-04-20 PROCEDURE — 81001 URINALYSIS AUTO W/SCOPE: CPT | Performed by: FAMILY MEDICINE

## 2023-04-20 PROCEDURE — 36415 COLL VENOUS BLD VENIPUNCTURE: CPT | Performed by: FAMILY MEDICINE

## 2023-04-21 ENCOUNTER — TELEPHONE (OUTPATIENT)
Dept: FAMILY MEDICINE CLINIC | Facility: CLINIC | Age: 68
End: 2023-04-21
Payer: MEDICARE

## 2023-04-21 RX ORDER — NITROFURANTOIN 25; 75 MG/1; MG/1
100 CAPSULE ORAL 2 TIMES DAILY
Qty: 14 CAPSULE | Refills: 0 | Status: SHIPPED | OUTPATIENT
Start: 2023-04-21 | End: 2023-04-28

## 2023-04-24 ENCOUNTER — OFFICE VISIT (OUTPATIENT)
Dept: FAMILY MEDICINE CLINIC | Facility: CLINIC | Age: 68
End: 2023-04-24
Payer: MEDICARE

## 2023-04-24 VITALS
SYSTOLIC BLOOD PRESSURE: 122 MMHG | WEIGHT: 168 LBS | DIASTOLIC BLOOD PRESSURE: 74 MMHG | HEART RATE: 76 BPM | OXYGEN SATURATION: 99 % | BODY MASS INDEX: 27.99 KG/M2 | HEIGHT: 65 IN | TEMPERATURE: 98.1 F

## 2023-04-24 DIAGNOSIS — N18.31 STAGE 3A CHRONIC KIDNEY DISEASE: ICD-10-CM

## 2023-04-24 DIAGNOSIS — Z23 NEED FOR TDAP VACCINATION: Primary | ICD-10-CM

## 2023-04-24 DIAGNOSIS — R23.2 HOT FLASHES: ICD-10-CM

## 2023-04-24 DIAGNOSIS — E78.2 MIXED HYPERLIPIDEMIA: ICD-10-CM

## 2023-04-24 DIAGNOSIS — R06.02 SHORTNESS OF BREATH: ICD-10-CM

## 2023-04-24 DIAGNOSIS — N39.0 URINARY TRACT INFECTION WITHOUT HEMATURIA, SITE UNSPECIFIED: ICD-10-CM

## 2023-04-24 DIAGNOSIS — I10 ESSENTIAL HYPERTENSION: ICD-10-CM

## 2023-04-24 DIAGNOSIS — Z23 NEED FOR PNEUMOCOCCAL VACCINE: ICD-10-CM

## 2023-04-24 DIAGNOSIS — E11.65 TYPE 2 DIABETES MELLITUS WITH HYPERGLYCEMIA, WITHOUT LONG-TERM CURRENT USE OF INSULIN: ICD-10-CM

## 2023-04-24 DIAGNOSIS — Z51.81 MEDICATION MONITORING ENCOUNTER: ICD-10-CM

## 2023-04-24 RX ORDER — ALBUTEROL SULFATE 90 UG/1
2 AEROSOL, METERED RESPIRATORY (INHALATION) EVERY 4 HOURS PRN
Qty: 8 G | Refills: 1 | Status: SHIPPED | OUTPATIENT
Start: 2023-04-24

## 2023-04-24 NOTE — PROGRESS NOTES
"Chief Complaint  Diabetes    Subjective        Leilani Ordaz presents to [facility]  History of Present Illness  Patient presents today to follow-up for diabetes.  She had labs done prior to the appointment.  Her urinalysis showed a urinary tract infection.  She was symptomatic as well.  The symptoms are improving now.  She reports having had left flank pain.  The lipid panel showed her LDL at 70.  She is currently taking atorvastatin 80 mg daily.  Her A1c is 6.3%.  She is currently taking metformin at 1000 mg twice daily.  CMP showed an elevated creatinine at 1.28.  Her GFR was 46.  I did discuss with her monitoring at this time.  She is currently taking lisinopril 20 mg daily.  Her CBC showed no anemia with normal white blood cell count and platelets.  She has had some issues lately with fatigue and shortness of breath with exertion.  She denies any chest pain.  She has no prior smoking history.  This has been going on for about 2 months.  She denies any cough.  I did discuss with patient getting a chest x-ray as well as lung function testing.  She does admit to not exercising regularly.  We did discuss doing some physical activity regularly such as walking.  She is also had some hot flashes periodically.  They have not been as bothersome.  She is not interested in any treatment at this time.  She denies any fevers.  Blood pressure has been adequately controlled taking lisinopril 20 mg daily.  Objective   Vital Signs:  /74 (BP Location: Left arm, Patient Position: Sitting)   Pulse 76   Temp 98.1 °F (36.7 °C) (Oral)   Ht 165.1 cm (65\")   Wt 76.2 kg (168 lb)   SpO2 99%   BMI 27.96 kg/m²   Estimated body mass index is 27.96 kg/m² as calculated from the following:    Height as of this encounter: 165.1 cm (65\").    Weight as of this encounter: 76.2 kg (168 lb).             Physical Exam   General: AAO ×3, no acute distress, pleasant  HEENT: Normocephalic, " atraumatic  Cardiovascular: Regular rate and rhythm without appreciable murmur  Respiratory: Clear to auscultation bilaterally no RRW  Gastrointestinal: Soft nontender nondistended with bowel sounds present  extremities: No edema  Neurologic: CN II through XII grossly intact   Psychiatric: Normal mood and affect  Result Review :                   Assessment and Plan   Diagnoses and all orders for this visit:    1. Need for Tdap vaccination (Primary)  -     Tdap Vaccine Greater Than or Equal To 6yo IM    2. Need for pneumococcal vaccine  -     Pneumococcal Conjugate Vaccine 20-Valent (PCV20)    3. Urinary tract infection without hematuria, site unspecified    4. Type 2 diabetes mellitus with hyperglycemia, without long-term current use of insulin  -     CBC & Differential; Future  -     Comprehensive Metabolic Panel; Future  -     Hemoglobin A1c; Future  -     Microalbumin / Creatinine Urine Ratio - Urine, Clean Catch; Future    5. Medication monitoring encounter    6. Essential hypertension    7. Stage 3a chronic kidney disease    8. Shortness of breath  -     XR Chest PA & Lateral; Future  -     Full Pulmonary Function Test With Bronchodilator; Future    9. Hot flashes    10. Mixed hyperlipidemia  -     Lipid Panel; Future    Other orders  -     albuterol sulfate  (90 Base) MCG/ACT inhaler; Inhale 2 puffs Every 4 (Four) Hours As Needed for Wheezing.  Dispense: 8 g; Refill: 1    We discussed continue current management for type 2 diabetes.  Plan to have her labs repeated when she returns for follow-up.  We discussed continue current management for hypertension.  I have asked for her to get a chest x-ray done.  She denies any chest pain.  We did discuss working on her endurance.  As far as her hot flashes are concerned I discussed with patient monitoring at this time.  Should her symptoms worsen she is instructed to call or return.         Follow Up   Return in about 6 months (around 10/24/2023) for  hypertension.  Patient was given instructions and counseling regarding her condition or for health maintenance advice. Please see specific information pulled into the AVS if appropriate.

## 2023-05-10 ENCOUNTER — HOSPITAL ENCOUNTER (OUTPATIENT)
Dept: GENERAL RADIOLOGY | Facility: HOSPITAL | Age: 68
Discharge: HOME | End: 2023-05-10
Payer: MEDICARE

## 2023-05-10 ENCOUNTER — HOSPITAL ENCOUNTER (OUTPATIENT)
Dept: RESPIRATORY THERAPY | Facility: HOSPITAL | Age: 68
Discharge: HOME | End: 2023-05-10
Payer: MEDICARE

## 2023-05-10 DIAGNOSIS — R06.02 SHORTNESS OF BREATH: ICD-10-CM

## 2023-05-10 DIAGNOSIS — R06.02 SHORTNESS OF BREATH: Primary | ICD-10-CM

## 2023-05-10 DIAGNOSIS — D71 GRANULOMATOUS DISEASE: ICD-10-CM

## 2023-05-10 PROCEDURE — 94729 DIFFUSING CAPACITY: CPT

## 2023-05-10 PROCEDURE — 71046 X-RAY EXAM CHEST 2 VIEWS: CPT

## 2023-05-10 PROCEDURE — 94060 EVALUATION OF WHEEZING: CPT

## 2023-05-10 PROCEDURE — 94726 PLETHYSMOGRAPHY LUNG VOLUMES: CPT

## 2023-05-10 RX ORDER — LEVALBUTEROL INHALATION SOLUTION 1.25 MG/3ML
1.25 SOLUTION RESPIRATORY (INHALATION) ONCE
Status: COMPLETED | OUTPATIENT
Start: 2023-05-10 | End: 2023-05-10

## 2023-05-10 RX ADMIN — LEVALBUTEROL HYDROCHLORIDE 1.25 MG: 1.25 SOLUTION RESPIRATORY (INHALATION) at 10:29

## 2023-06-05 RX ORDER — LORATADINE 10 MG/1
10 TABLET ORAL DAILY
Qty: 90 TABLET | Refills: 3 | Status: SHIPPED | OUTPATIENT
Start: 2023-06-05

## 2023-06-06 ENCOUNTER — HOSPITAL ENCOUNTER (OUTPATIENT)
Dept: CT IMAGING | Facility: HOSPITAL | Age: 68
Discharge: HOME | End: 2023-06-06
Admitting: FAMILY MEDICINE
Payer: MEDICARE

## 2023-06-06 DIAGNOSIS — D71 GRANULOMATOUS DISEASE: ICD-10-CM

## 2023-06-06 DIAGNOSIS — R06.02 SHORTNESS OF BREATH: ICD-10-CM

## 2023-06-06 PROCEDURE — 71250 CT THORAX DX C-: CPT

## 2023-06-14 ENCOUNTER — OFFICE VISIT (OUTPATIENT)
Dept: FAMILY MEDICINE CLINIC | Facility: CLINIC | Age: 68
End: 2023-06-14
Payer: MEDICARE

## 2023-06-14 VITALS
WEIGHT: 167.5 LBS | DIASTOLIC BLOOD PRESSURE: 78 MMHG | TEMPERATURE: 98.2 F | SYSTOLIC BLOOD PRESSURE: 130 MMHG | BODY MASS INDEX: 27.91 KG/M2 | OXYGEN SATURATION: 100 % | HEIGHT: 65 IN | HEART RATE: 71 BPM

## 2023-06-14 DIAGNOSIS — R06.02 SHORTNESS OF BREATH: ICD-10-CM

## 2023-06-14 DIAGNOSIS — K80.20 CALCULUS OF GALLBLADDER WITHOUT CHOLECYSTITIS WITHOUT OBSTRUCTION: ICD-10-CM

## 2023-06-14 DIAGNOSIS — I10 ESSENTIAL HYPERTENSION: Primary | ICD-10-CM

## 2023-06-14 DIAGNOSIS — I35.9 AORTIC VALVE CALCIFICATION: ICD-10-CM

## 2023-06-14 DIAGNOSIS — I77.9 RIGHT-SIDED CAROTID ARTERY DISEASE, UNSPECIFIED TYPE: ICD-10-CM

## 2023-06-14 DIAGNOSIS — E11.65 TYPE 2 DIABETES MELLITUS WITH HYPERGLYCEMIA, WITHOUT LONG-TERM CURRENT USE OF INSULIN: ICD-10-CM

## 2023-06-14 DIAGNOSIS — E78.2 MIXED HYPERLIPIDEMIA: ICD-10-CM

## 2023-06-14 DIAGNOSIS — I65.21 OCCLUSION AND STENOSIS OF RIGHT CAROTID ARTERY: ICD-10-CM

## 2023-06-14 DIAGNOSIS — E04.1 THYROID NODULE: ICD-10-CM

## 2023-06-14 DIAGNOSIS — Z78.0 POSTMENOPAUSAL: ICD-10-CM

## 2023-06-14 DIAGNOSIS — Z51.81 MEDICATION MONITORING ENCOUNTER: ICD-10-CM

## 2023-06-14 DIAGNOSIS — D71 GRANULOMATOUS DISEASE: ICD-10-CM

## 2023-06-14 DIAGNOSIS — I25.10 CORONARY ARTERY DISEASE INVOLVING NATIVE CORONARY ARTERY OF NATIVE HEART WITHOUT ANGINA PECTORIS: ICD-10-CM

## 2023-06-14 NOTE — PROGRESS NOTES
"Chief Complaint  Hypertension  Shortness of breath      Subjective        Leilani Ordaz presents to [facility]  History of Present Illness  Patient presents today to follow-up for hypertension.  Blood pressure has been adequately controlled taking lisinopril 20 mg daily.  She has had issues with shortness of breath for the past few months.  Symptoms do seem to be improving.  In the meantime we have been evaluating this.  She did have a chest x-ray done which showed old granulomatous disease.  Her PFT was normal.  She had a chest CT completed which showed mild aortic valve calcification as well as coronary artery calcification.  I did discuss with her getting further evaluation in this regard.  We discussed getting an echo as well as a stress test.  Her CT of the chest also showed a 1.2 cm nodule in the left thyroid lobe so she will need a dedicated ultrasound to further evaluate.  She is also noted to have cholelithiasis.  She is not reporting any symptoms of biliary colic.  I did discuss with her the option of monitoring this time.  She did have a screening through her insurance done which showed that she was at risk for osteoporosis we discussed getting a DEXA scan.  There was also concern about plaque buildup in the right carotid artery so we discussed getting dedicated ultrasound of the carotids.  She will be due for labs when she returns for follow-up.  Her last A1c was 6.3%.  She is currently taking metformin 1000 mg twice daily.  Objective   Vital Signs:  /78   Pulse 71   Temp 98.2 °F (36.8 °C)   Ht 165.1 cm (65\")   Wt 76 kg (167 lb 8 oz)   SpO2 100%   BMI 27.87 kg/m²   Estimated body mass index is 27.87 kg/m² as calculated from the following:    Height as of this encounter: 165.1 cm (65\").    Weight as of this encounter: 76 kg (167 lb 8 oz).             Physical Exam   General: AAO ×3, no acute distress, pleasant  HEENT: Normocephalic, " atraumatic  Cardiovascular: Regular rate and rhythm without appreciable murmur  Respiratory: Clear to auscultation bilaterally no RRW  Gastrointestinal: Soft nontender nondistended with bowel sounds present  extremities: No edema  Neurologic: CN II through XII grossly intact   Psychiatric: Normal mood and affect  Result Review :                   Assessment and Plan   Diagnoses and all orders for this visit:    1. Essential hypertension (Primary)    2. Type 2 diabetes mellitus with hyperglycemia, without long-term current use of insulin    3. Medication monitoring encounter    4. Shortness of breath  -     Adult Transthoracic Echo Complete W/ Cont if Necessary Per Protocol; Future  -     Treadmill Stress Test; Future    5. Mixed hyperlipidemia    6. Granulomatous disease    7. Coronary artery disease involving native coronary artery of native heart without angina pectoris  -     Adult Transthoracic Echo Complete W/ Cont if Necessary Per Protocol; Future  -     Treadmill Stress Test; Future    8. Aortic valve calcification    9. Thyroid nodule  -     US Thyroid; Future    10. Calculus of gallbladder without cholecystitis without obstruction    11. Postmenopausal  -     DEXA Bone Density Axial; Future    12. Right-sided carotid artery disease, unspecified type  -     US Carotid Bilateral; Future    13. Occlusion and stenosis of right carotid artery  -     US Carotid Bilateral; Future    Plan as documented above.  I discussed with patient further evaluation.  Further recommendations to follow once results return.  I have asked for her to get her labs done prior to next appointment.  Patient instructed to call with any questions or concerns.         Follow Up   Return if symptoms worsen or fail to improve.  Patient was given instructions and counseling regarding her condition or for health maintenance advice. Please see specific information pulled into the AVS if appropriate.       Answers submitted by the patient for  this visit:  Other (Submitted on 6/13/2023)  Please describe your symptoms.: Tired, breathless  Have you had these symptoms before?: No  How long have you been having these symptoms?: Greater than 2 weeks  Primary Reason for Visit (Submitted on 6/13/2023)  What is the primary reason for your visit?: Other

## 2023-06-16 DIAGNOSIS — I77.9 RIGHT-SIDED CAROTID ARTERY DISEASE, UNSPECIFIED TYPE: Primary | ICD-10-CM

## 2023-06-16 DIAGNOSIS — I65.21 OCCLUSION AND STENOSIS OF RIGHT CAROTID ARTERY: ICD-10-CM

## 2023-07-25 ENCOUNTER — HOSPITAL ENCOUNTER (OUTPATIENT)
Dept: BONE DENSITY | Facility: HOSPITAL | Age: 68
Discharge: HOME | End: 2023-07-25
Payer: MEDICARE

## 2023-07-25 ENCOUNTER — HOSPITAL ENCOUNTER (OUTPATIENT)
Dept: ULTRASOUND IMAGING | Facility: HOSPITAL | Age: 68
Discharge: HOME | End: 2023-07-25
Payer: MEDICARE

## 2023-07-25 ENCOUNTER — HOSPITAL ENCOUNTER (OUTPATIENT)
Dept: CARDIOLOGY | Facility: HOSPITAL | Age: 68
Discharge: HOME | End: 2023-07-25
Payer: MEDICARE

## 2023-07-25 DIAGNOSIS — E04.1 THYROID NODULE: Primary | ICD-10-CM

## 2023-07-25 DIAGNOSIS — I77.9 RIGHT-SIDED CAROTID ARTERY DISEASE, UNSPECIFIED TYPE: ICD-10-CM

## 2023-07-25 DIAGNOSIS — E04.1 THYROID NODULE: ICD-10-CM

## 2023-07-25 DIAGNOSIS — I65.21 OCCLUSION AND STENOSIS OF RIGHT CAROTID ARTERY: ICD-10-CM

## 2023-07-25 DIAGNOSIS — Z78.0 POSTMENOPAUSAL: ICD-10-CM

## 2023-07-25 LAB
BH CV XLRA MEAS LEFT CAROTID BULB EDV: 9 CM/SEC
BH CV XLRA MEAS LEFT CAROTID BULB PSV: 44 CM/SEC
BH CV XLRA MEAS LEFT DIST CCA EDV: 21 CM/SEC
BH CV XLRA MEAS LEFT DIST CCA PSV: 75 CM/SEC
BH CV XLRA MEAS LEFT DIST ICA EDV: 26 CM/SEC
BH CV XLRA MEAS LEFT DIST ICA PSV: 75 CM/SEC
BH CV XLRA MEAS LEFT ICA/CCA RATIO: 0.93
BH CV XLRA MEAS LEFT MID ICA EDV: 22 CM/SEC
BH CV XLRA MEAS LEFT MID ICA PSV: 71 CM/SEC
BH CV XLRA MEAS LEFT PROX CCA EDV: 19 CM/SEC
BH CV XLRA MEAS LEFT PROX CCA PSV: 73 CM/SEC
BH CV XLRA MEAS LEFT PROX ECA EDV: 11 CM/SEC
BH CV XLRA MEAS LEFT PROX ECA PSV: 74 CM/SEC
BH CV XLRA MEAS LEFT PROX ICA EDV: 21 CM/SEC
BH CV XLRA MEAS LEFT PROX ICA PSV: 70 CM/SEC
BH CV XLRA MEAS LEFT VERTEBRAL A EDV: 16 CM/SEC
BH CV XLRA MEAS LEFT VERTEBRAL A PSV: 48 CM/SEC
BH CV XLRA MEAS RIGHT CAROTID BULB EDV: 17 CM/SEC
BH CV XLRA MEAS RIGHT CAROTID BULB PSV: 91 CM/SEC
BH CV XLRA MEAS RIGHT DIST CCA EDV: 22 CM/SEC
BH CV XLRA MEAS RIGHT DIST CCA PSV: 84 CM/SEC
BH CV XLRA MEAS RIGHT DIST ICA EDV: 31 CM/SEC
BH CV XLRA MEAS RIGHT DIST ICA PSV: 86 CM/SEC
BH CV XLRA MEAS RIGHT ICA/CCA RATIO: 0.83
BH CV XLRA MEAS RIGHT MID ICA EDV: 24 CM/SEC
BH CV XLRA MEAS RIGHT MID ICA PSV: 75 CM/SEC
BH CV XLRA MEAS RIGHT PROX CCA EDV: 17 CM/SEC
BH CV XLRA MEAS RIGHT PROX CCA PSV: 64 CM/SEC
BH CV XLRA MEAS RIGHT PROX ECA EDV: 11 CM/SEC
BH CV XLRA MEAS RIGHT PROX ECA PSV: 83 CM/SEC
BH CV XLRA MEAS RIGHT PROX ICA EDV: 17 CM/SEC
BH CV XLRA MEAS RIGHT PROX ICA PSV: 70 CM/SEC
BH CV XLRA MEAS RIGHT VERTEBRAL A EDV: 11 CM/SEC
BH CV XLRA MEAS RIGHT VERTEBRAL A PSV: 39 CM/SEC
LEFT ARM BP: NORMAL MMHG
RIGHT ARM BP: NORMAL MMHG

## 2023-07-25 PROCEDURE — 77080 DXA BONE DENSITY AXIAL: CPT

## 2023-07-25 PROCEDURE — 93880 EXTRACRANIAL BILAT STUDY: CPT

## 2023-07-25 PROCEDURE — 76536 US EXAM OF HEAD AND NECK: CPT

## 2023-08-02 ENCOUNTER — TELEPHONE (OUTPATIENT)
Dept: FAMILY MEDICINE CLINIC | Facility: CLINIC | Age: 68
End: 2023-08-02

## 2023-08-02 NOTE — TELEPHONE ENCOUNTER
Caller: Leilani Ordaz    Relationship: Self    Best call back number: [phone]    What is the best time to reach you: ANYTIME    Who are you requesting to speak with (clinical staff, provider,  specific staff member): NURSE     What was the call regarding: PATIENT IS CALLING WANTING TO CHECK ON WHEN SHE WILL HAVE THE BIOPSY DONE ON THYROID HAS NOT HEAR ANYTHING AS OF TODAY

## 2023-08-10 ENCOUNTER — HOSPITAL ENCOUNTER (OUTPATIENT)
Dept: ULTRASOUND IMAGING | Facility: HOSPITAL | Age: 68
Discharge: HOME | End: 2023-08-10
Payer: MEDICARE

## 2023-08-10 DIAGNOSIS — E04.1 THYROID NODULE: ICD-10-CM

## 2023-08-10 PROCEDURE — 88173 CYTOPATH EVAL FNA REPORT: CPT | Performed by: FAMILY MEDICINE

## 2023-08-10 PROCEDURE — 0 LIDOCAINE 1 % SOLUTION: Performed by: FAMILY MEDICINE

## 2023-08-10 RX ORDER — LIDOCAINE HYDROCHLORIDE 10 MG/ML
10 INJECTION, SOLUTION INFILTRATION; PERINEURAL ONCE
Status: COMPLETED | OUTPATIENT
Start: 2023-08-10 | End: 2023-08-10

## 2023-08-10 RX ADMIN — LIDOCAINE HYDROCHLORIDE 10 ML: 10 INJECTION, SOLUTION INFILTRATION; PERINEURAL at 10:25

## 2023-08-11 LAB
CYTO UR: NORMAL
LAB AP CASE REPORT: NORMAL
LAB AP CLINICAL INFORMATION: NORMAL
PATH REPORT.FINAL DX SPEC: NORMAL
PATH REPORT.GROSS SPEC: NORMAL

## 2023-08-14 DIAGNOSIS — E04.1 THYROID NODULE: Primary | ICD-10-CM

## 2023-08-16 ENCOUNTER — OFFICE VISIT (OUTPATIENT)
Dept: FAMILY MEDICINE CLINIC | Facility: CLINIC | Age: 68
End: 2023-08-16
Payer: MEDICARE

## 2023-08-16 VITALS
RESPIRATION RATE: 14 BRPM | HEART RATE: 70 BPM | OXYGEN SATURATION: 98 % | HEIGHT: 66 IN | DIASTOLIC BLOOD PRESSURE: 78 MMHG | SYSTOLIC BLOOD PRESSURE: 146 MMHG | WEIGHT: 166.5 LBS | BODY MASS INDEX: 26.76 KG/M2 | TEMPERATURE: 97.8 F

## 2023-08-16 DIAGNOSIS — Z98.890 S/P THYROID BIOPSY: Primary | ICD-10-CM

## 2023-08-16 RX ORDER — PHENOL 1.4 %
600 AEROSOL, SPRAY (ML) MUCOUS MEMBRANE DAILY
COMMUNITY

## 2023-08-16 RX ORDER — MELATONIN
1000 DAILY
COMMUNITY

## 2023-08-16 NOTE — PROGRESS NOTES
"Chief Complaint  Neck Pain (Had thyroid bx last week on Thursday woke up today with neck stiffness and some pain)    Subjective      Leilani Ordaz is a 67 year old female that presents to [facility] with c/o swelling and some stiffness in the left side of the neck that started this morning. She states that she had a thyroid biopsy last week. She did initially have a little discomfort after the procedure. Today when she woke up she describes the pain as a 9 on the pain scale. She states that as the day has progressed, symptoms have greatly improved and she doesn't feel swollen anymore. She did have a mild discomfort in the left ear as well. She denies fever, body aches or chills. No open areas or drainage from the biopsy site. She did call the radiologist that performed the procedure and was advised to see her pcp.         History of Present Illness    Current Outpatient Medications   Medication Instructions    Aspirin Low Dose 81 mg, Oral, Daily    atorvastatin (LIPITOR) 80 MG tablet TAKE ONE TABLET BY MOUTH DAILY FOR CHOLESTEROL    calcium carbonate (OS-DIANE) 600 mg, Oral, Daily    cholecalciferol (VITAMIN D3) 1,000 Units, Oral, Daily    lisinopril (PRINIVIL,ZESTRIL) 20 mg, Oral, Daily    loratadine (CLARITIN) 10 mg, Oral, Daily    metFORMIN (GLUCOPHAGE) 1000 MG tablet TAKE ONE TABLET BY MOUTH TWICE DAILY       The following portions of the patient's history were reviewed and updated as appropriate: allergies, current medications, past family history, past medical history, past social history, past surgical history, and problem list.    Objective   Vital Signs:   /78   Pulse 70   Temp 97.8 øF (36.6 øC)   Resp 14   Ht 167.6 cm (66\")   Wt 75.5 kg (166 lb 8 oz)   SpO2 98%   BMI 26.87 kg/mý     Wt Readings from Last 3 Encounters:   08/16/23 75.5 kg (166 lb 8 oz)   06/26/23 74.8 kg (165 lb)   06/14/23 76 kg (167 lb 8 oz)     BP Readings from Last 3 Encounters:   08/16/23 " 146/78   06/14/23 130/78   04/24/23 122/74     Physical Exam  Vitals reviewed.   Constitutional:       Appearance: Normal appearance. She is well-developed.   HENT:      Head: Normocephalic and atraumatic.      Left Ear: Tympanic membrane, ear canal and external ear normal.   Eyes:      Conjunctiva/sclera: Conjunctivae normal.      Pupils: Pupils are equal, round, and reactive to light.   Neck:      Thyroid: No thyroid tenderness.      Comments: No open areas in the skin.   Cardiovascular:      Rate and Rhythm: Normal rate and regular rhythm.      Heart sounds: Normal heart sounds. No murmur heard.  Pulmonary:      Effort: Pulmonary effort is normal.      Breath sounds: Normal breath sounds.   Lymphadenopathy:      Cervical: No cervical adenopathy.   Skin:     General: Skin is warm and dry.   Neurological:      Mental Status: She is alert and oriented to person, place, and time.   Psychiatric:         Mood and Affect: Affect normal.        Result Review :  The following data was reviewed by: RUEL Paula on 08/16/2023:            Lab Results (last 72 hours)       ** No results found for the last 72 hours. **               US Thyroid    Result Date: 7/25/2023    1. 1.7 centimeter left thyroid nodule is solid and heterogeneous with some hypoechoic components.  It may be a T rads 4 nodule.  Recommend biopsy. 2. Small 0.5 centimeter T rads 4 right thyroid nodule.  No other thyroid nodules.     SAMMI VILLARREAL MD       Electronically Signed and Approved By: SAMMI VILLARREAL MD on 7/25/2023 at 11:00             US Fine Needle Aspiration BX 1st Lesion    Result Date: 8/10/2023   Successful ultrasound-guided thyroid biopsy of left thyroid nodule without evidence of complication.      SAMMI VILLARREAL MD       Electronically Signed and Approved By: SAMMI VILLARREAL MD on 8/10/2023 at 12:41               Lab Results   Component Value Date    BILIRUBINUR Negative 04/20/2023       Procedures        Assessment  and Plan   Diagnoses and all orders for this visit:    1. Neck pain on left side (Primary)    2. S/P thyroid biopsy      Advised to monitor for any signs/symptoms of infection including but not limited to fever, body aches chills, redness at the site, etc. If these develop, return to the office.     There are no discontinued medications.       Follow Up   No follow-ups on file.  Patient was given instructions and counseling regarding her condition or for health maintenance advice. Please see specific information pulled into the AVS if appropriate.     Can apply a warm compress to the area for comfort if needed.     Josefa Oleary, RUEL  08/24/23  08:43 EDT

## 2023-09-28 ENCOUNTER — HOSPITAL ENCOUNTER (OUTPATIENT)
Dept: ULTRASOUND IMAGING | Facility: HOSPITAL | Age: 68
Discharge: HOME | End: 2023-09-28
Payer: MEDICARE

## 2023-09-28 DIAGNOSIS — E04.1 THYROID NODULE: ICD-10-CM

## 2023-09-28 PROCEDURE — 0 LIDOCAINE 1 % SOLUTION: Performed by: FAMILY MEDICINE

## 2023-09-28 RX ORDER — LIDOCAINE HYDROCHLORIDE 10 MG/ML
10 INJECTION, SOLUTION INFILTRATION; PERINEURAL ONCE
Status: COMPLETED | OUTPATIENT
Start: 2023-09-28 | End: 2023-09-28

## 2023-09-28 RX ADMIN — LIDOCAINE HYDROCHLORIDE 10 ML: 10 INJECTION, SOLUTION INFILTRATION; PERINEURAL at 10:35

## 2023-10-02 DIAGNOSIS — E04.1 THYROID NODULE: Primary | ICD-10-CM

## 2023-10-04 DIAGNOSIS — E04.1 THYROID NODULE: Primary | ICD-10-CM

## 2023-10-09 ENCOUNTER — LAB (OUTPATIENT)
Dept: LAB | Facility: HOSPITAL | Age: 68
End: 2023-10-09
Payer: MEDICARE

## 2023-10-09 DIAGNOSIS — E04.1 THYROID NODULE: ICD-10-CM

## 2023-10-09 LAB — TSH SERPL DL<=0.05 MIU/L-ACNC: 2.58 UIU/ML (ref 0.27–4.2)

## 2023-10-09 PROCEDURE — 84443 ASSAY THYROID STIM HORMONE: CPT

## 2023-10-09 PROCEDURE — 36415 COLL VENOUS BLD VENIPUNCTURE: CPT

## 2023-10-19 ENCOUNTER — LAB (OUTPATIENT)
Dept: LAB | Facility: HOSPITAL | Age: 68
End: 2023-10-19
Payer: MEDICARE

## 2023-10-19 DIAGNOSIS — E11.65 TYPE 2 DIABETES MELLITUS WITH HYPERGLYCEMIA, WITHOUT LONG-TERM CURRENT USE OF INSULIN: ICD-10-CM

## 2023-10-19 DIAGNOSIS — E78.2 MIXED HYPERLIPIDEMIA: ICD-10-CM

## 2023-10-19 LAB
ALBUMIN SERPL-MCNC: 4.2 G/DL (ref 3.5–5.2)
ALBUMIN UR-MCNC: 1.3 MG/DL
ALBUMIN/GLOB SERPL: 1.6 G/DL
ALP SERPL-CCNC: 67 U/L (ref 39–117)
ALT SERPL W P-5'-P-CCNC: 12 U/L (ref 1–33)
ANION GAP SERPL CALCULATED.3IONS-SCNC: 9 MMOL/L (ref 5–15)
AST SERPL-CCNC: 19 U/L (ref 1–32)
BASOPHILS # BLD AUTO: 0.05 10*3/MM3 (ref 0–0.2)
BASOPHILS NFR BLD AUTO: 0.6 % (ref 0–1.5)
BILIRUB SERPL-MCNC: 0.4 MG/DL (ref 0–1.2)
BUN SERPL-MCNC: 17 MG/DL (ref 8–23)
BUN/CREAT SERPL: 14.5 (ref 7–25)
CALCIUM SPEC-SCNC: 9.8 MG/DL (ref 8.6–10.5)
CHLORIDE SERPL-SCNC: 105 MMOL/L (ref 98–107)
CHOLEST SERPL-MCNC: 140 MG/DL (ref 0–200)
CO2 SERPL-SCNC: 25 MMOL/L (ref 22–29)
CREAT SERPL-MCNC: 1.17 MG/DL (ref 0.57–1)
CREAT UR-MCNC: 139.2 MG/DL
DEPRECATED RDW RBC AUTO: 38.6 FL (ref 37–54)
EGFRCR SERPLBLD CKD-EPI 2021: 50.9 ML/MIN/1.73
EOSINOPHIL # BLD AUTO: 0.04 10*3/MM3 (ref 0–0.4)
EOSINOPHIL NFR BLD AUTO: 0.5 % (ref 0.3–6.2)
ERYTHROCYTE [DISTWIDTH] IN BLOOD BY AUTOMATED COUNT: 12.2 % (ref 12.3–15.4)
GLOBULIN UR ELPH-MCNC: 2.6 GM/DL
GLUCOSE SERPL-MCNC: 106 MG/DL (ref 65–99)
HBA1C MFR BLD: 6.3 % (ref 4.8–5.6)
HCT VFR BLD AUTO: 39.8 % (ref 34–46.6)
HDLC SERPL-MCNC: 41 MG/DL (ref 40–60)
HGB BLD-MCNC: 13.1 G/DL (ref 12–15.9)
IMM GRANULOCYTES # BLD AUTO: 0.03 10*3/MM3 (ref 0–0.05)
IMM GRANULOCYTES NFR BLD AUTO: 0.3 % (ref 0–0.5)
LDLC SERPL CALC-MCNC: 77 MG/DL (ref 0–100)
LDLC/HDLC SERPL: 1.83 {RATIO}
LYMPHOCYTES # BLD AUTO: 2.82 10*3/MM3 (ref 0.7–3.1)
LYMPHOCYTES NFR BLD AUTO: 31.9 % (ref 19.6–45.3)
MCH RBC QN AUTO: 28.9 PG (ref 26.6–33)
MCHC RBC AUTO-ENTMCNC: 32.9 G/DL (ref 31.5–35.7)
MCV RBC AUTO: 87.7 FL (ref 79–97)
MICROALBUMIN/CREAT UR: 9.3 MG/G (ref 0–29)
MONOCYTES # BLD AUTO: 0.55 10*3/MM3 (ref 0.1–0.9)
MONOCYTES NFR BLD AUTO: 6.2 % (ref 5–12)
NEUTROPHILS NFR BLD AUTO: 5.34 10*3/MM3 (ref 1.7–7)
NEUTROPHILS NFR BLD AUTO: 60.5 % (ref 42.7–76)
NRBC BLD AUTO-RTO: 0.1 /100 WBC (ref 0–0.2)
PLATELET # BLD AUTO: 241 10*3/MM3 (ref 140–450)
PMV BLD AUTO: 11.5 FL (ref 6–12)
POTASSIUM SERPL-SCNC: 4.4 MMOL/L (ref 3.5–5.2)
PROT SERPL-MCNC: 6.8 G/DL (ref 6–8.5)
RBC # BLD AUTO: 4.54 10*6/MM3 (ref 3.77–5.28)
SODIUM SERPL-SCNC: 139 MMOL/L (ref 136–145)
TRIGL SERPL-MCNC: 119 MG/DL (ref 0–150)
VLDLC SERPL-MCNC: 22 MG/DL (ref 5–40)
WBC NRBC COR # BLD: 8.83 10*3/MM3 (ref 3.4–10.8)

## 2023-10-19 PROCEDURE — 85025 COMPLETE CBC W/AUTO DIFF WBC: CPT

## 2023-10-19 PROCEDURE — 82043 UR ALBUMIN QUANTITATIVE: CPT

## 2023-10-19 PROCEDURE — 83036 HEMOGLOBIN GLYCOSYLATED A1C: CPT

## 2023-10-19 PROCEDURE — 82570 ASSAY OF URINE CREATININE: CPT

## 2023-10-19 PROCEDURE — 80061 LIPID PANEL: CPT

## 2023-10-19 PROCEDURE — 80053 COMPREHEN METABOLIC PANEL: CPT

## 2023-10-24 ENCOUNTER — OFFICE VISIT (OUTPATIENT)
Dept: FAMILY MEDICINE CLINIC | Facility: CLINIC | Age: 68
End: 2023-10-24
Payer: MEDICARE

## 2023-10-24 VITALS
TEMPERATURE: 98.1 F | SYSTOLIC BLOOD PRESSURE: 132 MMHG | DIASTOLIC BLOOD PRESSURE: 66 MMHG | HEART RATE: 78 BPM | WEIGHT: 166.4 LBS | BODY MASS INDEX: 27.72 KG/M2 | OXYGEN SATURATION: 98 % | HEIGHT: 65 IN

## 2023-10-24 DIAGNOSIS — D71 GRANULOMATOUS DISEASE: ICD-10-CM

## 2023-10-24 DIAGNOSIS — R06.02 SHORTNESS OF BREATH: ICD-10-CM

## 2023-10-24 DIAGNOSIS — I35.9 AORTIC VALVE CALCIFICATION: ICD-10-CM

## 2023-10-24 DIAGNOSIS — M25.561 CHRONIC PAIN OF RIGHT KNEE: ICD-10-CM

## 2023-10-24 DIAGNOSIS — N18.31 STAGE 3A CHRONIC KIDNEY DISEASE: ICD-10-CM

## 2023-10-24 DIAGNOSIS — J30.9 ALLERGIC RHINITIS, UNSPECIFIED SEASONALITY, UNSPECIFIED TRIGGER: ICD-10-CM

## 2023-10-24 DIAGNOSIS — I51.7 LVH (LEFT VENTRICULAR HYPERTROPHY): ICD-10-CM

## 2023-10-24 DIAGNOSIS — G89.29 CHRONIC PAIN OF RIGHT KNEE: ICD-10-CM

## 2023-10-24 DIAGNOSIS — Z51.81 MEDICATION MONITORING ENCOUNTER: ICD-10-CM

## 2023-10-24 DIAGNOSIS — E11.65 TYPE 2 DIABETES MELLITUS WITH HYPERGLYCEMIA, WITHOUT LONG-TERM CURRENT USE OF INSULIN: ICD-10-CM

## 2023-10-24 DIAGNOSIS — M85.80 OSTEOPENIA, UNSPECIFIED LOCATION: ICD-10-CM

## 2023-10-24 DIAGNOSIS — E78.2 MIXED HYPERLIPIDEMIA: ICD-10-CM

## 2023-10-24 DIAGNOSIS — E04.1 THYROID NODULE: Primary | ICD-10-CM

## 2023-10-24 RX ORDER — FLUTICASONE PROPIONATE 50 MCG
2 SPRAY, SUSPENSION (ML) NASAL DAILY
Qty: 16 G | Refills: 1 | Status: SHIPPED | OUTPATIENT
Start: 2023-10-24

## 2023-10-24 NOTE — PROGRESS NOTES
Chief Complaint  Follow up for thyroid nodule  Ears feel full  Right knee pain      Subjective        Leilani Ordaz presents to [facility]  History of Present Illness  Patient presents today to follow-up for a left thyroid nodule that has been biopsied on 2 separate occasions.  The first fine-needle aspiration path report in August showed that this was a nondiagnostic specimen Palmer category 1.  He repeats was favored to be Palmer category 2 with an indeterminate follicular lesion.  I did discuss with patient findings and the need for monitoring through ENT.  Patient does have an appointment to see Dr. Nicole in November and she is encouraged to keep this appointment.  We did check her TSH level which was normal 2 weeks ago.  She did have a DEXA scan completed which showed osteopenia.  We did discuss calcium and vitamin D supplementation.  Her carotid ultrasound showed that she had no significant narrowing in the left or right internal carotid arteries.  We did discuss continue with aspirin 81 mg as well as atorvastatin 80 mg.  Patient reports that she has had right knee pain for the last several months.  She denies any injury.  She does have pain anteriorly.  We did discuss getting an x-ray for further evaluation.  She does have mild chronic kidney disease which we are monitoring at this time.  She is already taking lisinopril 20 mg daily.  She had labs drawn prior to the appointment so we reviewed these.  Lipid panel showed her LDL at 77.  We discussed continuing with atorvastatin 80 mg daily.  Her A1c is 6.3% which is stable.  She is taking metformin at 1000 mg twice daily.  Plan to continue current management.  CMP showed stable renal function with normal LFTs.  CBC showed no anemia with normal white blood cell count and platelets.  We had previously discussed issues with shortness of breath over the past few months.  Her symptoms have improved.  Chest x-ray was done that  "showed old granulomatous disease.  Her PFT was normal.  Chest CT showed mild aortic valve calcification as well as coronary artery calcification.  She did get an echo as well as a stress test for further evaluation.  Treadmill stress test was limited due to protocol and shortness of breath.  There was no ischemic EKG changes.  Her echocardiogram showed left ventricular hypertrophy with normal left ventricular systolic function.  It is noted that she has calcification of the aortic valve.  I did discuss with patient monitoring at this time with plans to repeat her echocardiogram in 2 years as clinically indicated.  We did discuss the calcified granulomatous findings noted on her chest CT.  I did offer referral to pulmonology but she would like to hold off at this time.  This is sequela from a previous granulomatous infection with no active pulmonary abnormality noted.  She has had ear fullness bilaterally the left feeling worse in the right side.  She does have issues with allergies.  Objective   Vital Signs:  /66 (BP Location: Left arm, Patient Position: Sitting)   Pulse 78   Temp 98.1 °F (36.7 °C) (Oral)   Ht 165.1 cm (65\")   Wt 75.5 kg (166 lb 6.4 oz)   SpO2 98%   BMI 27.69 kg/m²   Estimated body mass index is 27.69 kg/m² as calculated from the following:    Height as of this encounter: 165.1 cm (65\").    Weight as of this encounter: 75.5 kg (166 lb 6.4 oz).               Physical Exam  Vitals reviewed.   Constitutional:       Appearance: Normal appearance.   HENT:      Head: Normocephalic and atraumatic.      Ears:      Comments: TM intact bilaterally with no erythema.  No fluid in the external auditory canal bilaterally.     Nose: Congestion present.      Mouth/Throat:      Pharynx: No oropharyngeal exudate.   Eyes:      Conjunctiva/sclera: Conjunctivae normal.   Cardiovascular:      Rate and Rhythm: Normal rate and regular rhythm.      Heart sounds: No murmur heard.     No friction rub. No gallop. "   Pulmonary:      Effort: Pulmonary effort is normal.      Breath sounds: Normal breath sounds. No wheezing or rhonchi.   Abdominal:      General: Bowel sounds are normal. There is no distension.      Palpations: Abdomen is soft.      Tenderness: There is no abdominal tenderness.   Musculoskeletal:      Comments: Right knee demonstrates negative valgus and varus stress testing, negative Guerrero, negative anterior and posterior drawer testing.   Skin:     General: Skin is warm and dry.   Neurological:      Mental Status: She is alert and oriented to person, place, and time.      Cranial Nerves: No cranial nerve deficit.   Psychiatric:         Mood and Affect: Mood and affect normal.         Behavior: Behavior normal.         Thought Content: Thought content normal.         Judgment: Judgment normal.        Result Review :                   Assessment and Plan   Diagnoses and all orders for this visit:    1. Thyroid nodule (Primary)    2. Osteopenia, unspecified location    3. LVH (left ventricular hypertrophy)    4. Aortic valve calcification    5. Chronic pain of right knee  -     XR Knee 3 View Right; Future    6. Allergic rhinitis, unspecified seasonality, unspecified trigger    7. Type 2 diabetes mellitus with hyperglycemia, without long-term current use of insulin    8. Medication monitoring encounter    9. Mixed hyperlipidemia    10. Stage 3a chronic kidney disease    11. Shortness of breath    12. Granulomatous disease    Other orders  -     fluticasone (FLONASE) 50 MCG/ACT nasal spray; 2 sprays into the nostril(s) as directed by provider Daily.  Dispense: 16 g; Refill: 1  -     Diclofenac Sodium (VOLTAREN) 1 % gel gel; Apply 4 g topically to the appropriate area as directed 4 (Four) Times a Day As Needed (joint pain).  Dispense: 100 g; Refill: 1    Plan as documented above.  She is encouraged to keep appointment with ENT for further monitoring for her thyroid nodule.  We did discuss getting an x-ray of the  right knee for further evaluation.  I suspect this is arthritis related.  I will send in Voltaren gel.  Flonase will be sent in to help out with allergies as well as nasal congestion.  She is encouraged to continue taking Claritin.  We discussed continuing current management for type 2 diabetes.  Patient to return in 2 months for Medicare annual wellness visit.         Follow Up   Return in about 8 weeks (around 12/19/2023) for Medicare Wellness.  Patient was given instructions and counseling regarding her condition or for health maintenance advice. Please see specific information pulled into the AVS if appropriate.

## 2023-10-25 ENCOUNTER — HOSPITAL ENCOUNTER (OUTPATIENT)
Dept: GENERAL RADIOLOGY | Facility: HOSPITAL | Age: 68
Discharge: HOME | End: 2023-10-25
Admitting: FAMILY MEDICINE
Payer: MEDICARE

## 2023-10-25 DIAGNOSIS — G89.29 CHRONIC PAIN OF RIGHT KNEE: ICD-10-CM

## 2023-10-25 DIAGNOSIS — M25.561 CHRONIC PAIN OF RIGHT KNEE: ICD-10-CM

## 2023-10-25 PROCEDURE — 73562 X-RAY EXAM OF KNEE 3: CPT

## 2023-11-06 ENCOUNTER — OFFICE VISIT (OUTPATIENT)
Dept: PODIATRY | Facility: CLINIC | Age: 68
End: 2023-11-06
Payer: MEDICARE

## 2023-11-06 VITALS
OXYGEN SATURATION: 97 % | DIASTOLIC BLOOD PRESSURE: 85 MMHG | TEMPERATURE: 98.5 F | SYSTOLIC BLOOD PRESSURE: 142 MMHG | BODY MASS INDEX: 27.62 KG/M2 | WEIGHT: 166 LBS | HEART RATE: 74 BPM

## 2023-11-06 DIAGNOSIS — E11.9 NON-INSULIN DEPENDENT TYPE 2 DIABETES MELLITUS: ICD-10-CM

## 2023-11-06 DIAGNOSIS — E11.8 DIABETIC FOOT: Primary | ICD-10-CM

## 2023-11-06 PROCEDURE — 99213 OFFICE O/P EST LOW 20 MIN: CPT | Performed by: PODIATRIST

## 2023-11-06 PROCEDURE — 3079F DIAST BP 80-89 MM HG: CPT | Performed by: PODIATRIST

## 2023-11-06 PROCEDURE — 1160F RVW MEDS BY RX/DR IN RCRD: CPT | Performed by: PODIATRIST

## 2023-11-06 PROCEDURE — 1159F MED LIST DOCD IN RCRD: CPT | Performed by: PODIATRIST

## 2023-11-06 PROCEDURE — 3077F SYST BP >= 140 MM HG: CPT | Performed by: PODIATRIST

## 2023-11-06 PROCEDURE — G8404 LOW EXTEMITY NEUR EXAM DOCUM: HCPCS | Performed by: PODIATRIST

## 2023-11-06 NOTE — PROGRESS NOTES
[facility] - PODIATRY    Today's Date: 11/06/23    Patient Name: Leilani Ordaz  MRN: [id number]  CSN: [id number]  PCP: Js Tavares DO, Last PCP Visit: 24 November 2023  Referring Provider: No ref. provider found    SUBJECTIVE     Chief Complaint   Patient presents with    Left Foot - Follow-up, Diabetes    Right Foot - Follow-up, Diabetes     HPI: Leilani Ordaz, a 68 y.o.female, presents to clinic for a diabetic foot evaluation.    New, Established, New Problem: Established    Duration:  2011    Onset:  insidious    Nature:  NIDDM    Stable, worsening, improving:  stable    Patient controlling diabetes via:  Oral meds    Patient states they are unsure of the most recent blood glucose reading.      Patient denies any fevers, chills, nausea, vomiting, shortness of breath, nor any other constitutional signs nor symptoms.    Medical changes:  no changes    Past Medical History:   Diagnosis Date    Allergic     Arthritis     Broken bones     Cataract     Cholelithiasis     Diabetes     Diverticulosis     Esophageal reflux     Fibromyalgia, primary     Ganglion cyst 12/18/2018    Hemorrhoids     HLD (hyperlipidemia)     HTN (hypertension) 12/18/2018    Hyperlipemia     Limb swelling     Low back pain     Migraine     Neuropathy in diabetes 07/01/2019    OR SOONER    Night sweats     Seasonal allergies     Seizure     Sinus trouble     Skin disease     Syncope     Type 2 diabetes mellitus with stage 3 chronic kidney disease, without long-term current use of insulin 12/18/2018     Past Surgical History:   Procedure Laterality Date    COLONOSCOPY  2018    FOLLOW UP IN 5 YEARS (2023)    EYE SURGERY       Family History   Problem Relation Age of Onset    Heart disease Mother     Diabetes Mother     Arthritis Mother     Heart disease Father     Diabetes Father     Arthritis Father     Rashes / Skin problems Father     Arthritis Sister     Heart disease Other     Diabetes Other     Heart disease  Other     Diabetes Other     Heart disease Other     Diabetes Other     Diabetes Daughter     Diabetes Son      Social History     Socioeconomic History    Marital status:    Tobacco Use    Smoking status: Never    Smokeless tobacco: Never   Vaping Use    Vaping Use: Never used   Substance and Sexual Activity    Alcohol use: Yes     Comment: LIQUOR BUT VARY RARELY    Drug use: Not Currently    Sexual activity: Yes     Partners: Male     Birth control/protection: None     No Known Allergies  Current Outpatient Medications   Medication Sig Dispense Refill    Aspirin Low Dose 81 MG EC tablet Take 1 tablet by mouth Daily. 90 tablet 3    atorvastatin (LIPITOR) 80 MG tablet TAKE ONE TABLET BY MOUTH DAILY FOR CHOLESTEROL 90 tablet 3    calcium carbonate (OS-DIANE) 600 MG tablet Take 1 tablet by mouth Daily.      Cholecalciferol 25 MCG (1000 UT) tablet Take 1 tablet by mouth Daily.      Diclofenac Sodium (VOLTAREN) 1 % gel gel Apply 4 g topically to the appropriate area as directed 4 (Four) Times a Day As Needed (joint pain). 100 g 1    fluticasone (FLONASE) 50 MCG/ACT nasal spray 2 sprays into the nostril(s) as directed by provider Daily. 16 g 1    lisinopril (PRINIVIL,ZESTRIL) 20 MG tablet Take 1 tablet by mouth Daily 90 tablet 3    loratadine (Claritin) 10 MG tablet Take 1 tablet by mouth Daily. 90 tablet 3    metFORMIN (GLUCOPHAGE) 1000 MG tablet TAKE ONE TABLET BY MOUTH TWICE DAILY 180 tablet 3     No current facility-administered medications for this visit.     Review of Systems   Constitutional: Negative.    Neurological:  Positive for numbness.   All other systems reviewed and are negative.      OBJECTIVE     Vitals:    11/06/23 0728   BP: 142/85   Pulse: 74   Temp: 98.5 °F (36.9 °C)   SpO2: 97%       Body mass index is 27.62 kg/m².    Lab Results   Component Value Date    HGBA1C 6.30 (H) 10/19/2023       Lab Results   Component Value Date    GLUCOSE 106 (H) 10/19/2023    CALCIUM 9.8 10/19/2023      10/19/2023    K 4.4 10/19/2023    CO2 25.0 10/19/2023     10/19/2023    BUN 17 10/19/2023    CREATININE 1.17 (H) 10/19/2023    EGFRIFNONA 47 (L) 11/02/2021    BCR 14.5 10/19/2023    ANIONGAP 9.0 10/19/2023       Patient seen in no apparent distress.      PHYSICAL EXAM:     Foot/Ankle Exam    GENERAL  Diabetic foot exam performed    Appearance:  elderly  Orientation:  AAOx3  Affect:  appropriate  Gait:  unimpaired  Assistance:  independent  Right shoe gear: casual shoe  Left shoe gear: casual shoe    VASCULAR     Right Foot Vascularity   Dorsalis pedis:  2+  Posterior tibial:  2+  Skin temperature:  warm  Edema grading:  None  CFT:  < 3 seconds  Pedal hair growth:  Absent  Varicosities:  mild varicosities     Left Foot Vascularity   Dorsalis pedis:  2+  Posterior tibial:  2+  Skin temperature:  warm  Edema grading:  None  CFT:  < 3 seconds  Pedal hair growth:  Absent  Varicosities:  mild varicosities     NEUROLOGIC     Right Foot Neurologic   Light touch sensation: diminished  Vibratory sensation: diminished  Hot/Cold sensation: diminished  Protective Sensation using [place] Monofilament:   Sites intact: 6  Sites tested: 10     Left Foot Neurologic   Light touch sensation: diminished  Vibratory sensation: diminished  Hot/Cold sensation:  diminished  Protective Sensation using [place] Monofilament:   Sites intact: 6  Sites tested: 10    MUSCLE STRENGTH     Right Foot Muscle Strength   Foot dorsiflexion:  4  Foot plantar flexion:  4  Foot inversion:  4  Foot eversion:  4     Left Foot Muscle Strength   Foot dorsiflexion:  4  Foot plantar flexion:  4  Foot inversion:  4  Foot eversion:  4    RANGE OF MOTION     Right Foot Range of Motion   Foot and ankle ROM within normal limits       Left Foot Range of Motion   Foot and ankle ROM within normal limits      DERMATOLOGIC      Right Foot Dermatologic   Skin  Right foot skin is intact.      Left Foot Dermatologic   Skin  Left foot skin is intact.      Diabetic Foot Exam Performed and Monofilament Test Performed    ASSESSMENT/PLAN     Diagnoses and all orders for this visit:    1. Diabetic foot (Primary)    2. Non-insulin dependent type 2 diabetes mellitus    Medications and allergies reviewed.  Reviewed available blood glucose and HgB A1C lab values along with other pertinent labs.  These were discussed with the patient as to their importance of diabetic maintenance.    Comprehensive lower extremity examination and evaluation was performed.    Discussed findings and treatment plan including risks, benefits, and treatment options with patient in detail. Patient agreed with treatment plan.    Diabetic foot exam performed and documented this date, compliant with CQM required standards. Detail of findings as noted in physical exam.  Lower extremity Neurologic exam for diabetic patient performed and documented this date, compliant with PQRS required standards. Detail of findings as noted in physical exam.  Advised patient importance of good routine lower extremity hygiene. Advised patient importance of evaluating for intact skin and pain free nail borders.  Advised patient to use mirror to evaluate plantar/ soles of feet for better visualization. Advised patient monitor and phone office to be seen if any cracking to skin, open lesions, painful nail borders or if nails become elongated prior to next visit. Advised patient importance of daily cleansing of lower extremities, followed by good skin cream to maintain normal hydration of skin. Also advised patient importance of close daily monitoring of blood sugar. Advised to regulate diet and medications to maintain control of blood sugar in optimal range. Contact primary care provider if difficulties maintaining blood sugar levels.  Advised Patient of presence of Diabetes Mellitus condition.  Advised Patient risk of progression and worsening or improvement, then return of condition.  Will monitor condition for any  change in future. Treat with most appropriate treatment pending status of condition.  Counseled and advised patient extensively on nature and ramifications of diabetes. Standard instructions given to patient for good diabetic foot care and maintenance. Advised importance of careful monitoring to avoid break down and complications secondary to diabetes. Advised patient importance of strict maintenance of blood sugar control. Advised patient of possible ominous results from neglect of condition, i.e.: amputation/ loss of digits, feet and legs, or even death.  Patient states understands counseling, will monitor closely, continue good hygiene and routine diabetic foot care. Patient will contact office is questions or problems.      An After Visit Summary was printed and given to the patient at discharge, including (if requested) any available informative/educational handouts regarding diagnosis, treatment, or medications. All questions were answered to patient/family satisfaction. Should symptoms fail to improve or worsen they agree to call or return to clinic or to go to the Emergency Department. Discussed the importance of following up with any needed screening tests/labs/specialist appointments and any requested follow-up recommended by me today. Importance of maintaining follow-up discussed and patient accepts that missed appointments can delay diagnosis and potentially lead to worsening of conditions.    Return in about 1 year (around 11/6/2024) for DFE., or sooner if acute issues arise.    This document has been electronically signed by Dandre Rebolledo DPM on November 6, 2023 07:51 EST

## 2023-11-09 ENCOUNTER — PREP FOR SURGERY (OUTPATIENT)
Dept: OTHER | Facility: HOSPITAL | Age: 68
End: 2023-11-09
Payer: MEDICARE

## 2023-11-09 ENCOUNTER — CLINICAL SUPPORT (OUTPATIENT)
Dept: GASTROENTEROLOGY | Facility: CLINIC | Age: 68
End: 2023-11-09
Payer: MEDICARE

## 2023-11-09 DIAGNOSIS — Z86.010 HISTORY OF COLON POLYPS: ICD-10-CM

## 2023-11-09 DIAGNOSIS — Z12.11 ENCOUNTER FOR SCREENING FOR MALIGNANT NEOPLASM OF COLON: Primary | ICD-10-CM

## 2023-11-09 PROBLEM — Z86.0100 HISTORY OF COLON POLYPS: Status: ACTIVE | Noted: 2023-11-09

## 2023-11-09 RX ORDER — POLYETHYLENE GLYCOL 3350, SODIUM CHLORIDE, POTASSIUM CHLORIDE, SODIUM BICARBONATE, AND SODIUM SULFATE 240; 5.84; 2.98; 6.72; 22.72 G/4L; G/4L; G/4L; G/4L; G/4L
4000 POWDER, FOR SOLUTION ORAL DAILY
Qty: 4000 ML | Refills: 0 | Status: SHIPPED | OUTPATIENT
Start: 2023-11-09

## 2023-11-09 NOTE — PROGRESS NOTES
Leilani Ordaz  1955  68 y.o.    Reason for call: Recall- 5 YR RECALL, HX COLON POLYPS, LAST COLON 2018- [facility]  Prep prescribed: Josie- due to kidney function  Prep instructions reviewed with patient and sent to patient via [email or web address]  Is the patient currently on any injectable medications for weight loss or diabetes? No  Clearance needed? No  If yes, what clearance is needed? N/A  Clearance has been requested from N/A  The patient has been scheduled for: Colonoscopy  Family history of colon cancer? No  If yes, indicate relative: N/A  Family History   Problem Relation Age of Onset    Heart disease Mother     Diabetes Mother     Arthritis Mother     Heart disease Father     Diabetes Father     Arthritis Father     Rashes / Skin problems Father     Arthritis Sister     Diabetes Daughter     Diabetes Son     Heart disease Other     Diabetes Other     Heart disease Other     Diabetes Other     Heart disease Other     Diabetes Other     Colon cancer Neg Hx      Past Medical History:   Diagnosis Date    Allergic     Arthritis     Broken bones     Cataract     Cholelithiasis     Colon polyp     Diabetes     Diverticulosis     Esophageal reflux     Fibromyalgia, primary     Ganglion cyst 12/18/2018    Hemorrhoids     HLD (hyperlipidemia)     HTN (hypertension) 12/18/2018    Hyperlipemia     Limb swelling     Low back pain     Migraine     Neuropathy in diabetes 07/01/2019    OR SOONER    Night sweats     Seasonal allergies     Seizure     Sinus trouble     Skin disease     Syncope     Type 2 diabetes mellitus with stage 3 chronic kidney disease, without long-term current use of insulin 12/18/2018     No Known Allergies  Past Surgical History:   Procedure Laterality Date    COLONOSCOPY  2018    FOLLOW UP IN 5 YEARS (2023)    EYE SURGERY       Social History     Socioeconomic History    Marital status:    Tobacco Use    Smoking status: Never    Smokeless tobacco: Never   Vaping Use    Vaping Use: Never used    Substance and Sexual Activity    Alcohol use: Not Currently     Comment: LIQUOR BUT VARY RARELY    Drug use: Not Currently    Sexual activity: Yes     Partners: Male     Birth control/protection: None       Current Outpatient Medications:     Aspirin Low Dose 81 MG EC tablet, Take 1 tablet by mouth Daily., Disp: 90 tablet, Rfl: 3    atorvastatin (LIPITOR) 80 MG tablet, TAKE ONE TABLET BY MOUTH DAILY FOR CHOLESTEROL, Disp: 90 tablet, Rfl: 3    calcium carbonate (OS-DIANE) 600 MG tablet, Take 1 tablet by mouth Daily., Disp: , Rfl:     Cholecalciferol 25 MCG (1000 UT) tablet, Take 1 tablet by mouth Daily., Disp: , Rfl:     Diclofenac Sodium (VOLTAREN) 1 % gel gel, Apply 4 g topically to the appropriate area as directed 4 (Four) Times a Day As Needed (joint pain)., Disp: 100 g, Rfl: 1    fluticasone (FLONASE) 50 MCG/ACT nasal spray, 2 sprays into the nostril(s) as directed by provider Daily., Disp: 16 g, Rfl: 1    lisinopril (PRINIVIL,ZESTRIL) 20 MG tablet, Take 1 tablet by mouth Daily, Disp: 90 tablet, Rfl: 3    loratadine (Claritin) 10 MG tablet, Take 1 tablet by mouth Daily., Disp: 90 tablet, Rfl: 3    metFORMIN (GLUCOPHAGE) 1000 MG tablet, TAKE ONE TABLET BY MOUTH TWICE DAILY, Disp: 180 tablet, Rfl: 3

## 2023-11-30 ENCOUNTER — OFFICE VISIT (OUTPATIENT)
Dept: OTOLARYNGOLOGY | Facility: CLINIC | Age: 68
End: 2023-11-30
Payer: MEDICARE

## 2023-11-30 ENCOUNTER — PREP FOR SURGERY (OUTPATIENT)
Dept: OTHER | Facility: HOSPITAL | Age: 68
End: 2023-11-30
Payer: MEDICARE

## 2023-11-30 VITALS
HEART RATE: 72 BPM | DIASTOLIC BLOOD PRESSURE: 93 MMHG | HEIGHT: 65 IN | BODY MASS INDEX: 27.62 KG/M2 | SYSTOLIC BLOOD PRESSURE: 166 MMHG | TEMPERATURE: 97.4 F

## 2023-11-30 DIAGNOSIS — E11.40 CONTROLLED TYPE 2 DIABETES MELLITUS WITH NEUROPATHY: ICD-10-CM

## 2023-11-30 DIAGNOSIS — E04.1 LEFT THYROID NODULE: Primary | ICD-10-CM

## 2023-11-30 DIAGNOSIS — J30.9 ALLERGIC RHINITIS, UNSPECIFIED SEASONALITY, UNSPECIFIED TRIGGER: ICD-10-CM

## 2023-11-30 DIAGNOSIS — D49.7 FOLLICULAR NEOPLASM OF THYROID: ICD-10-CM

## 2023-11-30 RX ORDER — POLYETHYLENE GLYCOL-3350 AND ELECTROLYTES 236; 6.74; 5.86; 2.97; 22.74 G/274.31G; G/274.31G; G/274.31G; G/274.31G; G/274.31G
POWDER, FOR SOLUTION ORAL
COMMUNITY
Start: 2023-11-09 | End: 2023-12-04

## 2023-11-30 RX ORDER — METHYLPREDNISOLONE 4 MG/1
TABLET ORAL
COMMUNITY
Start: 2023-11-17 | End: 2023-12-04

## 2023-11-30 RX ORDER — MELOXICAM 15 MG/1
15 TABLET ORAL DAILY
COMMUNITY
Start: 2023-11-17

## 2023-11-30 NOTE — PROGRESS NOTES
Patient Name: Leilani Ordaz   Visit Date: 11/30/2023   Patient ID: [id number]  Provider: Drew Nicole MD    Sex: female  Location: [facility]   YOB: 1955  Location Address: [address] [place] [address], [address] [place] [address],?[place]?[id number]    Primary Care Provider Js Tavares DO  Location Phone: [phone]    Referring Provider: No ref. provider found        Chief Complaint  THYROID NODULE    History of Present Illness  Leilani Ordaz is a 68 y.o. female who presents to [facility] for THYROID NODULE.  Patient this summer had a shortness of breath on exertion and had a work-up which included thyroid ultrasound.  Ultrasound revealed a left thyroid nodule and then subsequently she had a biopsy performed with ultrasound guidance.  Pathology evaluation on the cytology showed indeterminate nodule but was a follicular lesion.  Therefore patient was sent here for further evaluation and management.  Otherwise patient had extensive work-up but that was all negative and regarding her shortness of breath have resolved.  She suspected it may be due to dehydration.  Patient has diabetes type 2 and has some neuropathy associated with it.  But she gets eye exam and does not have any retinopathy.    Past Medical History:   Diagnosis Date    Allergic     Arthritis     Broken bones     Cataract     Cholelithiasis     Colon polyp     Diabetes     Diverticulosis     Esophageal reflux     Fibromyalgia, primary     Ganglion cyst 12/18/2018    Hemorrhoids     HLD (hyperlipidemia)     HTN (hypertension) 12/18/2018    Hyperlipemia     Limb swelling     Low back pain     Migraine     Neuropathy in diabetes 07/01/2019    OR SOONER    Night sweats     Seasonal allergies     Seizure     Sinus trouble     Skin disease     Syncope     Type 2 diabetes mellitus with stage 3 chronic kidney disease, without long-term current use of insulin 12/18/2018       Past Surgical History:  "  Procedure Laterality Date    COLONOSCOPY  2018    FOLLOW UP IN 5 YEARS (2023)    EYE SURGERY           Current Outpatient Medications:     Aspirin Low Dose 81 MG EC tablet, Take 1 tablet by mouth Daily., Disp: 90 tablet, Rfl: 3    atorvastatin (LIPITOR) 80 MG tablet, TAKE ONE TABLET BY MOUTH DAILY FOR CHOLESTEROL, Disp: 90 tablet, Rfl: 3    calcium carbonate (OS-DIANE) 600 MG tablet, Take 1 tablet by mouth Daily., Disp: , Rfl:     Cholecalciferol 25 MCG (1000 UT) tablet, Take 1 tablet by mouth Daily., Disp: , Rfl:     Diclofenac Sodium (VOLTAREN) 1 % gel gel, Apply 4 g topically to the appropriate area as directed 4 (Four) Times a Day As Needed (joint pain)., Disp: 100 g, Rfl: 1    fluticasone (FLONASE) 50 MCG/ACT nasal spray, 2 sprays into the nostril(s) as directed by provider Daily., Disp: 16 g, Rfl: 1    GaviLyte-G 236 g solution, , Disp: , Rfl:     lisinopril (PRINIVIL,ZESTRIL) 20 MG tablet, Take 1 tablet by mouth Daily, Disp: 90 tablet, Rfl: 3    loratadine (Claritin) 10 MG tablet, Take 1 tablet by mouth Daily., Disp: 90 tablet, Rfl: 3    meloxicam (MOBIC) 15 MG tablet, , Disp: , Rfl:     metFORMIN (GLUCOPHAGE) 1000 MG tablet, TAKE ONE TABLET BY MOUTH TWICE DAILY, Disp: 180 tablet, Rfl: 3    methylPREDNISolone (MEDROL) 4 MG tablet, , Disp: , Rfl:     polyethylene glycol with electrolytes (GOLYTELY) 240 g reconstituted solution solution, Take 4,000 mL by mouth Daily. Take as directed on package label., Disp: 4000 mL, Rfl: 0     No Known Allergies    Social History     Tobacco Use    Smoking status: Never    Smokeless tobacco: Never   Vaping Use    Vaping Use: Never used   Substance Use Topics    Alcohol use: Not Currently     Comment: LIQUOR BUT VARY RARELY    Drug use: Not Currently        Objective     Vital Signs:   Vitals:    11/30/23 0758   BP: 166/93   BP Location: Left arm   Patient Position: Sitting   Cuff Size: Adult   Pulse: 72   Temp: 97.4 °F (36.3 °C)   TempSrc: Temporal   Height: 165.1 cm (65\") "       Tobacco Use: Low Risk  (11/30/2023)    Patient History     Smoking Tobacco Use: Never     Smokeless Tobacco Use: Never     Passive Exposure: Not on file         Physical Exam    Constitutional   Appearance  well developed, well-nourished, alert and in no acute distress, voice clear and strong    Head   Inspection  no deformities or lesions      Face   Inspection  no facial lesions; House-Brackmann I/VI bilaterally   Palpation  no TMJ crepitus nor  muscle tenderness bilaterally     Eyes/Vision   Visual Fields  extraocular movements are intact, no spontaneous or gaze-induced nystagmus  Conjunctivae  clear   Sclerae  clear   Pupils and Irises  pupils equal, round, and reactive to light.   Nystagmus  not present     Ears, Nose, Mouth and Throat  Ears  External Ears  appearance within normal limits, no lesions present   Otoscopic Examination  tympanic membrane appearance within normal limits bilaterally without perforations, well-aerated middle ears   Hearing  intact to conversational voice both ears   Tunning fork testing    Rinne:  Lee:    Nose  External Nose  appearance normal   Intranasal Exam  mucosa within normal limits, vestibules normal, no intranasal lesions present, septum midline, sinuses non tender to percussion   Modified [place] Test:    Oral Cavity  Oral Mucosa  oral mucosa normal without pallor or cyanosis   Lips  lip appearance normal   Teeth  normal dentition for age   Gums  gums pink, non-swollen, no bleeding present   Tongue  tongue appearance normal; normal mobility   Palate  hard palate normal, soft palate appearance normal with symmetric mobility     Throat  Oropharynx  no inflammation or lesions present, tonsils within normal limits   Hypopharynx  appearance within normal limits   Larynx  voice normal     Neck  Inspection/Palpation  normal appearance, no masses or tenderness, trachea midline; thyroid size normal, nontender, no nodules or masses present on palpation      Lymphatic  Neck  no lymphadenopathy present   Supraclavicular Nodes  no lymphadenopathy present   Preauricular Nodes  no lymphadenopathy present     Respiratory  Respiratory Effort  breathing unlabored   Inspection of Chest  normal appearance, no retractions     Musculoskeletal   Cervical back: Normal range of motion and neck supple.      Skin and Subcutaneous Tissue  General Inspection  Regarding face and neck - there are no rashes present, no lesions present, and no areas of discoloration     Neurologic  Cranial Nerves  cranial nerves II-XII are grossly intact bilaterally   Gait and Station  normal gait, able to stand without diffculty    Psychiatric  Judgement and Insight  judgment and insight intact   Mood and Affect  mood normal, affect appropriate       RESULTS REVIEWED    I have reviewed the following information:   [x]  Previous Internal Note  []  Previous External Note:   [x]  Ordered Tests & Results:      Pathology:   Lab Results   Component Value Date    CASEREPORT  09/28/2023     Medical Cytology Report                           Case: [id number]                                  Authorizing Provider:  Js Tavares DO      Collected:           09/28/2023 10:40 AM          Ordering Location:     [facility]   Received:            09/29/2023 08:52 AM          Pathologist:           Ryan Mccurdy MD                                                            Specimen:    Thyroid, Left Dr Foy 1040                                                                CLININFO  09/28/2023     Thyroid nodule.  Repeat non-diagnostic thyroid FNA ([id number]).      FINALDX  09/28/2023     Thyroid gland, left lobe, FNA:   - Indeterminate follicular lesion (low cellularity/minimal to no colloid), favor benign (Favor [place] category II: Benign)        GROSSDES  09/28/2023     1. Thyroid.  Fine Needle Aspiration, thyroid, left lobe       38 cc total volume in cytofixative received and 8 prepared  smears received in ETOH (1 cytospin prep prepared in addition to the 8 prepared smears received).        MICRO  09/28/2023     Microscopic examination performed.         TSH   Date Value Ref Range Status   10/09/2023 2.580 0.270 - 4.200 uIU/mL Final     Calcium   Date Value Ref Range Status   10/19/2023 9.8 8.6 - 10.5 mg/dL Final     25 Hydroxy, Vitamin D   Date Value Ref Range Status   05/02/2019 35.2 30.0 - 100.0 ng/mL Final     Comment:     Vitamin D Status          Range  ---------------------------------------  Deficiency                <10 ng/mL  Insufficiency             10-30 ng/mL  Sufficiency                ng/mL  Toxicity                  >100 ng/mL         XR Knee 3 View Right    Result Date: 10/25/2023    Tricompartmental osteoarthritis with moderate joint space narrowing.       DEBBY RATLIFF MD       Electronically Signed and Approved By: DEBBY RATLIFF MD on 10/25/2023 at 13:59             US Fine Needle Aspiration BX 1st Lesion    Result Date: 9/28/2023    1. Technically successful fine needle aspiration of the suspected 1.7 cm nodular density in the inferior left thyroid lobe.      Paul Foy M.D.       Electronically Signed and Approved By: Paul Foy M.D. on 9/28/2023 at 11:37             US Fine Needle Aspiration BX 1st Lesion    Result Date: 8/10/2023   Successful ultrasound-guided thyroid biopsy of left thyroid nodule without evidence of complication.      SAMMI VILLARREAL MD       Electronically Signed and Approved By: SAMMI VILLARREAL MD on 8/10/2023 at 12:41               I have discussed the interpretation of the above results with the patient.    Procedures          Assessment and Plan   Diagnoses and all orders for this visit:    1. Left thyroid nodule (Primary)    2. Follicular neoplasm of thyroid    3. Allergic rhinitis, unspecified seasonality, unspecified trigger    4. Controlled type 2 diabetes mellitus with neuropathy        Leilani GURWINDER Ordaz  reports that she has never  smoked. She has never used smokeless tobacco. I have educated her on the risk of diseases from using tobacco products such as Cancer, COPD & Heart Disease.     Plan:  Patient Instructions   1.  Patient has a left thyroid nodule which upon fine-needle aspirate biopsy revealed follicular neoplasm but certainly given the pathology from the cytology is unable to tell whether this is a follicular adenoma or follicular carcinoma.  All the risk is not very high for follicular carcinoma I explained the situation to the patient and given her options of continued observation versus left thyroidectomy and she has chosen to proceed with surgical treatment.  I have explained the risk and benefits as well as alternatives to the procedure proposed.  THYROIDECTOMY: A thyroidectomy was recommended. The risks and benefits were explained including but not limited to bleeding, infection, persistent and/or recurrent disease, risks of the general anesthesia, pain, recurrent laryngeal nerve injury with hoarseness and airway loss, parathyroid injury and hypocalcemia. Operative possibilities including hemithyroidectomy, total thyroidectomy and jignesh dissections were discussed. Possibilities for delayed need for total thyroidectomy pending pathologic diagnosis were also discussed. Alternatives were discussed. No guarantees were made or implied. Questions were asked appropriately answered.        45 minutes were spent caring for Leilani on this date of service. This time spent by me includes preparing for the visit, reviewing tests, obtaining/reviewing separately obtained history, performing medically appropriate exam/evaluation, counseling/educating the patient/family/caregiver, ordering medications/tests/procedures, referring/communicating with other health care professionals, documenting information in the medical record, independently interpreting results and communicating that with the patient/family/caregiver and/or care coordination.        Follow Up   Return 1 week postop appointment for sutures.  Patient was given instructions and counseling regarding her condition or for health maintenance advice. Please see specific information pulled into the AVS if appropriate.

## 2023-11-30 NOTE — PATIENT INSTRUCTIONS
1.  Patient has a left thyroid nodule which upon fine-needle aspirate biopsy revealed follicular neoplasm but certainly given the pathology from the cytology is unable to tell whether this is a follicular adenoma or follicular carcinoma.  All the risk is not very high for follicular carcinoma I explained the situation to the patient and given her options of continued observation versus left thyroidectomy and she has chosen to proceed with surgical treatment.  I have explained the risk and benefits as well as alternatives to the procedure proposed.  THYROIDECTOMY: A thyroidectomy was recommended. The risks and benefits were explained including but not limited to bleeding, infection, persistent and/or recurrent disease, risks of the general anesthesia, pain, recurrent laryngeal nerve injury with hoarseness and airway loss, parathyroid injury and hypocalcemia. Operative possibilities including hemithyroidectomy, total thyroidectomy and jignesh dissections were discussed. Possibilities for delayed need for total thyroidectomy pending pathologic diagnosis were also discussed. Alternatives were discussed. No guarantees were made or implied. Questions were asked appropriately answered.

## 2023-12-01 ENCOUNTER — TELEPHONE (OUTPATIENT)
Dept: FAMILY MEDICINE CLINIC | Facility: CLINIC | Age: 68
End: 2023-12-01

## 2023-12-01 NOTE — TELEPHONE ENCOUNTER
Caller: Leilani Ordaz    Relationship: Self    Best call back number: [phone]       What was the call regarding: PATIENT REPORTS SHE WENT TO ENT 11/30 - AND SHE  IS NOW SCHEDULED FOR SURGERY ON 12/05 TO REMOVE NODULE / THYROID ON LEFT SIDE

## 2023-12-04 NOTE — PRE-PROCEDURE INSTRUCTIONS
IMPORTANT INSTRUCTIONS - PRE-ADMISSION TESTING  DO NOT EAT OR CHEW anything after midnight the night before your procedure.    You may have CLEAR liquids up to __2_ hours prior to ARRIVAL time.   Take the following medications the morning of your procedure with JUST A SIP OF WATER:  _CLARITIN__________  DO NOT BRING your medications to the hospital with you, UNLESS something has changed since your PRE-Admission Testing appointment.  Hold all vitamins, supplements, and NSAIDS (Non- steroidal anti-inflammatory meds) for one week prior to surgery (you MAY take Tylenol or Acetaminophen).  If you are diabetic, check your blood sugar the morning of your procedure. If it is less than 70 or if you are feeling symptomatic, call the following number for further instructions: [phone].  Use your inhalers/nebulizers as usual, the morning of your procedure. BRING YOUR INHALERS with you.   Bring your CPAP or BIPAP to hospital, ONLY IF YOU WILL BE SPENDING THE NIGHT.   Make sure you have a ride home and have someone who will stay with you the day of your procedure after you go home.  If you have any questions, please call your Pre-Admission Testing Nurse, BOLIVAR _ at [phone]_____.   Per anesthesia request, do not smoke for 24 hours before your procedure or as instructed by your surgeon.

## 2023-12-05 ENCOUNTER — HOSPITAL ENCOUNTER (OUTPATIENT)
Facility: HOSPITAL | Age: 68
Setting detail: OBSERVATION
Discharge: HOME | End: 2023-12-06
Attending: OTOLARYNGOLOGY | Admitting: OTOLARYNGOLOGY
Payer: MEDICARE

## 2023-12-05 ENCOUNTER — ANESTHESIA EVENT (OUTPATIENT)
Dept: PERIOP | Facility: HOSPITAL | Age: 68
End: 2023-12-05
Payer: MEDICARE

## 2023-12-05 ENCOUNTER — ANESTHESIA (OUTPATIENT)
Dept: PERIOP | Facility: HOSPITAL | Age: 68
End: 2023-12-05
Payer: MEDICARE

## 2023-12-05 DIAGNOSIS — D49.7 FOLLICULAR NEOPLASM OF THYROID: ICD-10-CM

## 2023-12-05 DIAGNOSIS — E04.1 LEFT THYROID NODULE: ICD-10-CM

## 2023-12-05 DIAGNOSIS — G89.18 POSTOPERATIVE PAIN: Primary | ICD-10-CM

## 2023-12-05 LAB
ANION GAP SERPL CALCULATED.3IONS-SCNC: 13.2 MMOL/L (ref 5–15)
BUN SERPL-MCNC: 16 MG/DL (ref 8–23)
BUN/CREAT SERPL: 11.9 (ref 7–25)
CALCIUM SPEC-SCNC: 10.1 MG/DL (ref 8.6–10.5)
CHLORIDE SERPL-SCNC: 100 MMOL/L (ref 98–107)
CO2 SERPL-SCNC: 24.8 MMOL/L (ref 22–29)
CREAT SERPL-MCNC: 1.35 MG/DL (ref 0.57–1)
EGFRCR SERPLBLD CKD-EPI 2021: 42.9 ML/MIN/1.73
GLUCOSE SERPL-MCNC: 139 MG/DL (ref 65–99)
POTASSIUM SERPL-SCNC: 4.1 MMOL/L (ref 3.5–5.2)
PTH-INTACT SERPL-MCNC: 22.4 PG/ML (ref 15–65)
SODIUM SERPL-SCNC: 138 MMOL/L (ref 136–145)

## 2023-12-05 PROCEDURE — A9270 NON-COVERED ITEM OR SERVICE: HCPCS | Performed by: OTOLARYNGOLOGY

## 2023-12-05 PROCEDURE — 63710000001 LISINOPRIL 20 MG TABLET: Performed by: OTOLARYNGOLOGY

## 2023-12-05 PROCEDURE — 94761 N-INVAS EAR/PLS OXIMETRY MLT: CPT

## 2023-12-05 PROCEDURE — 25010000002 ESMOLOL 100 MG/10ML SOLUTION: Performed by: NURSE ANESTHETIST, CERTIFIED REGISTERED

## 2023-12-05 PROCEDURE — A9270 NON-COVERED ITEM OR SERVICE: HCPCS | Performed by: NURSE ANESTHETIST, CERTIFIED REGISTERED

## 2023-12-05 PROCEDURE — 88307 TISSUE EXAM BY PATHOLOGIST: CPT | Performed by: OTOLARYNGOLOGY

## 2023-12-05 PROCEDURE — 25810000003 LACTATED RINGERS PER 1000 ML: Performed by: ANESTHESIOLOGY

## 2023-12-05 PROCEDURE — 25010000002 MIDAZOLAM PER 1MG: Performed by: ANESTHESIOLOGY

## 2023-12-05 PROCEDURE — 63710000001 FLUTICASONE 50 MCG/ACT SUSPENSION 16 G BOTTLE: Performed by: OTOLARYNGOLOGY

## 2023-12-05 PROCEDURE — 63710000001 OXYCODONE 5 MG TABLET: Performed by: NURSE ANESTHETIST, CERTIFIED REGISTERED

## 2023-12-05 PROCEDURE — 88331 PATH CONSLTJ SURG 1 BLK 1SPC: CPT | Performed by: OTOLARYNGOLOGY

## 2023-12-05 PROCEDURE — 83970 ASSAY OF PARATHORMONE: CPT | Performed by: OTOLARYNGOLOGY

## 2023-12-05 PROCEDURE — 63710000001 CHOLECALCIFEROL 25 MCG (1000 UT) TABLET: Performed by: OTOLARYNGOLOGY

## 2023-12-05 PROCEDURE — 25010000002 PROPOFOL 10 MG/ML EMULSION: Performed by: NURSE ANESTHETIST, CERTIFIED REGISTERED

## 2023-12-05 PROCEDURE — 94799 UNLISTED PULMONARY SVC/PX: CPT

## 2023-12-05 PROCEDURE — 25010000002 PHENYLEPHRINE 10 MG/ML SOLUTION 1 ML VIAL: Performed by: NURSE ANESTHETIST, CERTIFIED REGISTERED

## 2023-12-05 PROCEDURE — 63710000001 CETIRIZINE 10 MG TABLET: Performed by: OTOLARYNGOLOGY

## 2023-12-05 PROCEDURE — 80048 BASIC METABOLIC PNL TOTAL CA: CPT | Performed by: OTOLARYNGOLOGY

## 2023-12-05 PROCEDURE — 25010000002 FENTANYL CITRATE (PF) 50 MCG/ML SOLUTION: Performed by: NURSE ANESTHETIST, CERTIFIED REGISTERED

## 2023-12-05 PROCEDURE — 63710000001 HYDROCODONE-ACETAMINOPHEN 7.5-325 MG TABLET: Performed by: OTOLARYNGOLOGY

## 2023-12-05 PROCEDURE — G0378 HOSPITAL OBSERVATION PER HR: HCPCS

## 2023-12-05 PROCEDURE — 63710000001 CALCIUM CARBONATE 1500 (600 CA) MG TABLET: Performed by: OTOLARYNGOLOGY

## 2023-12-05 PROCEDURE — 25010000002 DEXAMETHASONE PER 1 MG: Performed by: NURSE ANESTHETIST, CERTIFIED REGISTERED

## 2023-12-05 PROCEDURE — 25010000002 HYDROMORPHONE 1 MG/ML SOLUTION: Performed by: NURSE ANESTHETIST, CERTIFIED REGISTERED

## 2023-12-05 PROCEDURE — 25810000003 SODIUM CHLORIDE 0.9 % SOLUTION 250 ML FLEX CONT: Performed by: NURSE ANESTHETIST, CERTIFIED REGISTERED

## 2023-12-05 DEVICE — LIGACLIP MCA MULTIPLE CLIP APPLIERS, 20 SMALL CLIPS
Type: IMPLANTABLE DEVICE | Site: THYROID | Status: FUNCTIONAL
Brand: LIGACLIP

## 2023-12-05 RX ORDER — MIDAZOLAM HYDROCHLORIDE 2 MG/2ML
2 INJECTION, SOLUTION INTRAMUSCULAR; INTRAVENOUS ONCE
Status: COMPLETED | OUTPATIENT
Start: 2023-12-05 | End: 2023-12-05

## 2023-12-05 RX ORDER — ACETAMINOPHEN 500 MG
1000 TABLET ORAL ONCE
Status: COMPLETED | OUTPATIENT
Start: 2023-12-05 | End: 2023-12-05

## 2023-12-05 RX ORDER — PROMETHAZINE HYDROCHLORIDE 12.5 MG/1
25 TABLET ORAL ONCE AS NEEDED
Status: DISCONTINUED | OUTPATIENT
Start: 2023-12-05 | End: 2023-12-05 | Stop reason: HOSPADM

## 2023-12-05 RX ORDER — CETIRIZINE HYDROCHLORIDE 10 MG/1
10 TABLET ORAL DAILY
Status: DISCONTINUED | OUTPATIENT
Start: 2023-12-05 | End: 2023-12-06 | Stop reason: HOSPADM

## 2023-12-05 RX ORDER — ROCURONIUM BROMIDE 10 MG/ML
INJECTION, SOLUTION INTRAVENOUS AS NEEDED
Status: DISCONTINUED | OUTPATIENT
Start: 2023-12-05 | End: 2023-12-05 | Stop reason: SURG

## 2023-12-05 RX ORDER — SODIUM CHLORIDE, SODIUM LACTATE, POTASSIUM CHLORIDE, CALCIUM CHLORIDE 600; 310; 30; 20 MG/100ML; MG/100ML; MG/100ML; MG/100ML
9 INJECTION, SOLUTION INTRAVENOUS CONTINUOUS PRN
Status: DISCONTINUED | OUTPATIENT
Start: 2023-12-05 | End: 2023-12-05 | Stop reason: HOSPADM

## 2023-12-05 RX ORDER — SODIUM CHLORIDE 0.9 % (FLUSH) 0.9 %
10 SYRINGE (ML) INJECTION AS NEEDED
Status: DISCONTINUED | OUTPATIENT
Start: 2023-12-05 | End: 2023-12-06 | Stop reason: HOSPADM

## 2023-12-05 RX ORDER — SODIUM CHLORIDE 9 MG/ML
40 INJECTION, SOLUTION INTRAVENOUS AS NEEDED
Status: DISCONTINUED | OUTPATIENT
Start: 2023-12-05 | End: 2023-12-06 | Stop reason: HOSPADM

## 2023-12-05 RX ORDER — ESMOLOL HYDROCHLORIDE 10 MG/ML
INJECTION INTRAVENOUS AS NEEDED
Status: DISCONTINUED | OUTPATIENT
Start: 2023-12-05 | End: 2023-12-05 | Stop reason: SURG

## 2023-12-05 RX ORDER — POLYETHYLENE GLYCOL 3350 17 G/17G
17 POWDER, FOR SOLUTION ORAL DAILY PRN
Status: DISCONTINUED | OUTPATIENT
Start: 2023-12-05 | End: 2023-12-06 | Stop reason: HOSPADM

## 2023-12-05 RX ORDER — CHLORAL HYDRATE 500 MG
1000 CAPSULE ORAL 2 TIMES DAILY WITH MEALS
COMMUNITY

## 2023-12-05 RX ORDER — AMOXICILLIN 250 MG
2 CAPSULE ORAL 2 TIMES DAILY
Status: DISCONTINUED | OUTPATIENT
Start: 2023-12-05 | End: 2023-12-06 | Stop reason: HOSPADM

## 2023-12-05 RX ORDER — PROMETHAZINE HYDROCHLORIDE 12.5 MG/1
12.5 SUPPOSITORY RECTAL EVERY 6 HOURS PRN
Status: DISCONTINUED | OUTPATIENT
Start: 2023-12-05 | End: 2023-12-06 | Stop reason: HOSPADM

## 2023-12-05 RX ORDER — LISINOPRIL 20 MG/1
20 TABLET ORAL DAILY
Status: DISCONTINUED | OUTPATIENT
Start: 2023-12-05 | End: 2023-12-06 | Stop reason: HOSPADM

## 2023-12-05 RX ORDER — HYDROCODONE BITARTRATE AND ACETAMINOPHEN 7.5; 325 MG/1; MG/1
1 TABLET ORAL EVERY 4 HOURS PRN
Status: DISCONTINUED | OUTPATIENT
Start: 2023-12-05 | End: 2023-12-06 | Stop reason: HOSPADM

## 2023-12-05 RX ORDER — FLUTICASONE PROPIONATE 50 MCG
2 SPRAY, SUSPENSION (ML) NASAL DAILY
Status: DISCONTINUED | OUTPATIENT
Start: 2023-12-05 | End: 2023-12-06 | Stop reason: HOSPADM

## 2023-12-05 RX ORDER — MELATONIN
1000 DAILY
Status: DISCONTINUED | OUTPATIENT
Start: 2023-12-05 | End: 2023-12-06 | Stop reason: HOSPADM

## 2023-12-05 RX ORDER — PROMETHAZINE HYDROCHLORIDE 25 MG/1
25 SUPPOSITORY RECTAL ONCE AS NEEDED
Status: DISCONTINUED | OUTPATIENT
Start: 2023-12-05 | End: 2023-12-05 | Stop reason: HOSPADM

## 2023-12-05 RX ORDER — LIDOCAINE HYDROCHLORIDE 20 MG/ML
INJECTION, SOLUTION EPIDURAL; INFILTRATION; INTRACAUDAL; PERINEURAL AS NEEDED
Status: DISCONTINUED | OUTPATIENT
Start: 2023-12-05 | End: 2023-12-05 | Stop reason: SURG

## 2023-12-05 RX ORDER — MAGNESIUM HYDROXIDE 1200 MG/15ML
LIQUID ORAL AS NEEDED
Status: DISCONTINUED | OUTPATIENT
Start: 2023-12-05 | End: 2023-12-05 | Stop reason: HOSPADM

## 2023-12-05 RX ORDER — GINSENG 100 MG
CAPSULE ORAL AS NEEDED
Status: DISCONTINUED | OUTPATIENT
Start: 2023-12-05 | End: 2023-12-05 | Stop reason: HOSPADM

## 2023-12-05 RX ORDER — PROMETHAZINE HYDROCHLORIDE 12.5 MG/1
12.5 TABLET ORAL EVERY 6 HOURS PRN
Status: DISCONTINUED | OUTPATIENT
Start: 2023-12-05 | End: 2023-12-06 | Stop reason: HOSPADM

## 2023-12-05 RX ORDER — SUCCINYLCHOLINE/SOD CL,ISO/PF 100 MG/5ML
SYRINGE (ML) INTRAVENOUS AS NEEDED
Status: DISCONTINUED | OUTPATIENT
Start: 2023-12-05 | End: 2023-12-05 | Stop reason: SURG

## 2023-12-05 RX ORDER — BISACODYL 5 MG/1
5 TABLET, DELAYED RELEASE ORAL DAILY PRN
Status: DISCONTINUED | OUTPATIENT
Start: 2023-12-05 | End: 2023-12-06 | Stop reason: HOSPADM

## 2023-12-05 RX ORDER — CALCIUM CARBONATE 500 MG/1
1 TABLET, CHEWABLE ORAL 3 TIMES DAILY PRN
Status: DISCONTINUED | OUTPATIENT
Start: 2023-12-05 | End: 2023-12-06 | Stop reason: HOSPADM

## 2023-12-05 RX ORDER — FENTANYL CITRATE 50 UG/ML
INJECTION, SOLUTION INTRAMUSCULAR; INTRAVENOUS AS NEEDED
Status: DISCONTINUED | OUTPATIENT
Start: 2023-12-05 | End: 2023-12-05 | Stop reason: SURG

## 2023-12-05 RX ORDER — OXYCODONE HYDROCHLORIDE 5 MG/1
5 TABLET ORAL
Status: DISCONTINUED | OUTPATIENT
Start: 2023-12-05 | End: 2023-12-05 | Stop reason: HOSPADM

## 2023-12-05 RX ORDER — SODIUM CHLORIDE 0.9 % (FLUSH) 0.9 %
10 SYRINGE (ML) INJECTION EVERY 12 HOURS SCHEDULED
Status: DISCONTINUED | OUTPATIENT
Start: 2023-12-05 | End: 2023-12-06 | Stop reason: HOSPADM

## 2023-12-05 RX ORDER — SODIUM CHLORIDE 450 MG/100ML
75 INJECTION, SOLUTION INTRAVENOUS CONTINUOUS
Status: DISCONTINUED | OUTPATIENT
Start: 2023-12-05 | End: 2023-12-06 | Stop reason: HOSPADM

## 2023-12-05 RX ORDER — DEXMEDETOMIDINE HYDROCHLORIDE 100 UG/ML
INJECTION, SOLUTION INTRAVENOUS AS NEEDED
Status: DISCONTINUED | OUTPATIENT
Start: 2023-12-05 | End: 2023-12-05 | Stop reason: SURG

## 2023-12-05 RX ORDER — ATORVASTATIN CALCIUM 40 MG/1
80 TABLET, FILM COATED ORAL DAILY
Status: DISCONTINUED | OUTPATIENT
Start: 2023-12-05 | End: 2023-12-06 | Stop reason: HOSPADM

## 2023-12-05 RX ORDER — BISACODYL 10 MG
10 SUPPOSITORY, RECTAL RECTAL DAILY PRN
Status: DISCONTINUED | OUTPATIENT
Start: 2023-12-05 | End: 2023-12-06 | Stop reason: HOSPADM

## 2023-12-05 RX ORDER — PROPOFOL 10 MG/ML
VIAL (ML) INTRAVENOUS AS NEEDED
Status: DISCONTINUED | OUTPATIENT
Start: 2023-12-05 | End: 2023-12-05 | Stop reason: SURG

## 2023-12-05 RX ORDER — MEPERIDINE HYDROCHLORIDE 25 MG/ML
12.5 INJECTION INTRAMUSCULAR; INTRAVENOUS; SUBCUTANEOUS
Status: DISCONTINUED | OUTPATIENT
Start: 2023-12-05 | End: 2023-12-05 | Stop reason: HOSPADM

## 2023-12-05 RX ORDER — LIDOCAINE HYDROCHLORIDE AND EPINEPHRINE 10; 10 MG/ML; UG/ML
INJECTION, SOLUTION INFILTRATION; PERINEURAL AS NEEDED
Status: DISCONTINUED | OUTPATIENT
Start: 2023-12-05 | End: 2023-12-05 | Stop reason: HOSPADM

## 2023-12-05 RX ORDER — ONDANSETRON 2 MG/ML
4 INJECTION INTRAMUSCULAR; INTRAVENOUS ONCE AS NEEDED
Status: DISCONTINUED | OUTPATIENT
Start: 2023-12-05 | End: 2023-12-05 | Stop reason: HOSPADM

## 2023-12-05 RX ORDER — DEXAMETHASONE SODIUM PHOSPHATE 4 MG/ML
INJECTION, SOLUTION INTRA-ARTICULAR; INTRALESIONAL; INTRAMUSCULAR; INTRAVENOUS; SOFT TISSUE AS NEEDED
Status: DISCONTINUED | OUTPATIENT
Start: 2023-12-05 | End: 2023-12-05 | Stop reason: SURG

## 2023-12-05 RX ADMIN — FENTANYL CITRATE 50 MCG: 50 INJECTION, SOLUTION INTRAMUSCULAR; INTRAVENOUS at 08:38

## 2023-12-05 RX ADMIN — ROCURONIUM BROMIDE 10 MG: 50 INJECTION INTRAVENOUS at 07:44

## 2023-12-05 RX ADMIN — SODIUM CHLORIDE 75 ML/HR: 4.5 INJECTION, SOLUTION INTRAVENOUS at 13:07

## 2023-12-05 RX ADMIN — PHENYLEPHRINE HYDROCHLORIDE 0.2 MCG/KG/MIN: 10 INJECTION INTRAVENOUS at 08:16

## 2023-12-05 RX ADMIN — DEXAMETHASONE SODIUM PHOSPHATE 4 MG: 4 INJECTION, SOLUTION INTRAMUSCULAR; INTRAVENOUS at 08:06

## 2023-12-05 RX ADMIN — Medication 600 MG: at 14:27

## 2023-12-05 RX ADMIN — HYDROMORPHONE HYDROCHLORIDE 0.5 MG: 1 INJECTION, SOLUTION INTRAMUSCULAR; INTRAVENOUS; SUBCUTANEOUS at 10:53

## 2023-12-05 RX ADMIN — ACETAMINOPHEN 1000 MG: 500 TABLET ORAL at 07:30

## 2023-12-05 RX ADMIN — DEXMEDETOMIDINE 15 MCG: 100 INJECTION, SOLUTION, CONCENTRATE INTRAVENOUS at 08:42

## 2023-12-05 RX ADMIN — SODIUM CHLORIDE, POTASSIUM CHLORIDE, SODIUM LACTATE AND CALCIUM CHLORIDE 9 ML/HR: 600; 310; 30; 20 INJECTION, SOLUTION INTRAVENOUS at 07:30

## 2023-12-05 RX ADMIN — OXYCODONE HYDROCHLORIDE 5 MG: 5 TABLET ORAL at 10:53

## 2023-12-05 RX ADMIN — FENTANYL CITRATE 50 MCG: 50 INJECTION, SOLUTION INTRAMUSCULAR; INTRAVENOUS at 08:25

## 2023-12-05 RX ADMIN — PROPOFOL 130 MG: 10 INJECTION, EMULSION INTRAVENOUS at 07:44

## 2023-12-05 RX ADMIN — LISINOPRIL 20 MG: 20 TABLET ORAL at 14:27

## 2023-12-05 RX ADMIN — DEXMEDETOMIDINE 15 MCG: 100 INJECTION, SOLUTION, CONCENTRATE INTRAVENOUS at 09:22

## 2023-12-05 RX ADMIN — Medication 100 MG: at 07:44

## 2023-12-05 RX ADMIN — CETIRIZINE HYDROCHLORIDE 10 MG: 10 TABLET, FILM COATED ORAL at 14:27

## 2023-12-05 RX ADMIN — Medication 10 ML: at 21:27

## 2023-12-05 RX ADMIN — Medication 1000 UNITS: at 14:27

## 2023-12-05 RX ADMIN — MIDAZOLAM HYDROCHLORIDE 2 MG: 1 INJECTION, SOLUTION INTRAMUSCULAR; INTRAVENOUS at 07:30

## 2023-12-05 RX ADMIN — FENTANYL CITRATE 50 MCG: 50 INJECTION, SOLUTION INTRAMUSCULAR; INTRAVENOUS at 07:44

## 2023-12-05 RX ADMIN — DEXMEDETOMIDINE 10 MCG: 100 INJECTION, SOLUTION, CONCENTRATE INTRAVENOUS at 08:28

## 2023-12-05 RX ADMIN — LIDOCAINE HYDROCHLORIDE 60 MG: 20 INJECTION, SOLUTION EPIDURAL; INFILTRATION; INTRACAUDAL; PERINEURAL at 07:44

## 2023-12-05 RX ADMIN — HYDROCODONE BITARTRATE AND ACETAMINOPHEN 1 TABLET: 7.5; 325 TABLET ORAL at 15:09

## 2023-12-05 RX ADMIN — HYDROMORPHONE HYDROCHLORIDE 0.5 MG: 1 INJECTION, SOLUTION INTRAMUSCULAR; INTRAVENOUS; SUBCUTANEOUS at 10:15

## 2023-12-05 RX ADMIN — FLUTICASONE PROPIONATE 2 SPRAY: 50 SPRAY, METERED NASAL at 14:27

## 2023-12-05 RX ADMIN — ESMOLOL HYDROCHLORIDE 10 MG: 100 INJECTION, SOLUTION INTRAVENOUS at 08:27

## 2023-12-05 RX ADMIN — HYDROCODONE BITARTRATE AND ACETAMINOPHEN 1 TABLET: 7.5; 325 TABLET ORAL at 19:38

## 2023-12-05 RX ADMIN — FENTANYL CITRATE 50 MCG: 50 INJECTION, SOLUTION INTRAMUSCULAR; INTRAVENOUS at 07:47

## 2023-12-05 NOTE — H&P
PRIMARY CARE PROVIDER: Js Tavares DO  REFERRING PROVIDER: Drew Nicole MD    CHIEF COMPLAINT:  Preoperative evaluation for surgery    Subjective   History of Present Illness:  Leilani Ordaz is a  68 y.o.  female who is here for the following problems:    Left thyroid nodule    Follicular neoplasm of thyroid      She is scheduled for LEFT THYROIDECTOMY WITH FROZEN SECTION, POSSIBLE TOTAL, RECURRENT LARYNGEAL NERVE MONITORING (Bilateral). There has been no significant change in the history since the preoperative office evaluation.     Review of Systems:  CONSTITUTIONAL: no fever or chills  PULMONARY: no cough or shortness of breath  GI: no nausea or vomiting    Past History:  Medical History: has a past medical history of Allergic, Arthritis, Broken bones, Cataract, Cholelithiasis, Colon polyp, Coronary artery disease, DDD (degenerative disc disease), lumbar, Diabetes, Diverticulosis, Esophageal reflux, Fibromyalgia, primary, Ganglion cyst (12/18/2018), Hemorrhoids, HLD (hyperlipidemia), HTN (hypertension) (12/18/2018), Hyperlipemia, Limb swelling, Low back pain, Migraine, Neuropathy in diabetes (07/01/2019), Night sweats, Seasonal allergies, Sinus congestion, Sinus trouble, Skin disease, Syncope, and Type 2 diabetes mellitus with stage 3 chronic kidney disease, without long-term current use of insulin (12/18/2018).   Surgical History: has a past surgical history that includes Colonoscopy (2018); Eye surgery; Cyst Removal; and Hand surgery.   Family History: family history includes Arthritis in her father, mother, and sister; Diabetes in her daughter, father, mother, son, and other family members; Heart disease in her father, mother, and other family members; Rashes / Skin problems in her father.   Social History: reports that she has never smoked. She has never used smokeless tobacco. She reports that she does not currently use alcohol. She reports that she does not currently use drugs.  Home  Medications:  Diclofenac Sodium, aspirin, atorvastatin, calcium carbonate, cholecalciferol, fish oil, fluticasone, lisinopril, loratadine, meloxicam, and metFORMIN     Allergies: Patient has no known allergies.     Objective     Vital Signs:  Temp:  [97.2 °F (36.2 °C)] 97.2 °F (36.2 °C)  Heart Rate:  [72] 72  Resp:  [18] 18  BP: (160)/(67) 160/67    Physical Exam:  CONSTITUTIONAL: well nourished, well-developed, alert, oriented, in no acute distress   COMMUNICATION AND VOICE: able to communicate normally, normal voice quality  HEAD: normocephalic, no lesions, atraumatic, no tenderness, no masses   FACE: appearance normal, no lesions, no tenderness, no deformities, facial motion symmetric  EYES: ocular motility normal, eyelids normal, orbits normal, no proptosis, conjunctiva normal , pupils equal, round   EARS:  Hearing: hearing to conversational voice intact bilaterally   External Ears: normal bilaterally, no lesions  NOSE:  External Nose: external nasal structure normal, no tenderness on palpation, no nasal discharge, no lesions, no evidence of trauma, nostrils patent   ORAL:  Lips: upper and lower lips without lesion   NECK:  Inspection and Palpation: neck appearance normal, no masses or tenderness  CHEST/RESPIRATORY: normal respiratory effort   CARDIOVASCULAR: no cyanosis or edema   NEUROLOGICAL/PSYCHIATRIC: oriented to time, place and person, mood normal, affect appropriate, CN II-XII intact grossly      ASSESSMENT:    Left thyroid nodule    Follicular neoplasm of thyroid    PLAN:  LEFT THYROIDECTOMY WITH FROZEN SECTION, POSSIBLE TOTAL, RECURRENT LARYNGEAL NERVE MONITORING (Bilateral)    THYROIDECTOMY: A thyroidectomy was recommended. The risks and benefits were explained including but not limited to bleeding, infection, persistent and/or recurrent disease, risks of the general anesthesia, pain, recurrent laryngeal nerve injury with hoarseness and airway loss, parathyroid injury and hypocalcemia. Operative  possibilities including hemithyroidectomy, total thyroidectomy and jignesh dissections were discussed. Possibilities for delayed need for total thyroidectomy pending pathologic diagnosis were also discussed. Alternatives were discussed. No guarantees were made or implied. Questions were asked appropriately answered.       Drew Nicoel MD  12/05/23  07:22 EST

## 2023-12-05 NOTE — PLAN OF CARE
Goal Outcome Evaluation:         VSS.  UOP.  Pain controlled per patient; see MAR.  Status post-thyroidectomy today.  Incision clean, dry, intact.  Possible d/c in AM.

## 2023-12-05 NOTE — OP NOTE
THYROIDECTOMY  Procedure Report    Patient Name:  Leilani Ordaz  YOB: 1955    Date of Surgery:  12/5/2023     Indications: Janis Ordaz is a 68-year-old female with left thyroid nodule which had been biopsied with fine-needle aspiration.  Biopsy results revealed indeterminate nodule suspecting follicular neoplasm.  Therefore patient has been recommended LEFT THYROIDECTOMY WITH ISTHMUSECTOMY AND FROZEN SECTION, RECURRENT LARYNGEAL NERVE MONITORING.  All the risks as well as benefits and alternatives have been discussed with the patient.  She understands and wants to proceed with the planned procedures.    Pre-op Diagnosis:   Left thyroid nodule [E04.1]  Follicular neoplasm of thyroid [D49.7]    Post-Op Diagnosis Codes:     * Left thyroid nodule [E04.1]     * Follicular neoplasm of thyroid [D49.7]      * Colloid thyroid nodule [248692]     Procedure/CPT® Codes:    LEFT THYROIDECTOMY WITH ISTHMUSECTOMY AND  FROZEN SECTION  RECURRENT LARYNGEAL NERVE MONITORING (1 HOUR)    Surgeon(s):  Drew Nicole MD    Staff:  Circulator: Talia Multani RN  Scrub Person: Leilani Ray  Assistant: Estela Santiago CSA  Other: Catalina Sue RN     Assistant: Estela Santiago CSA  was responsible for performing the following activities: Retraction, Suction, Irrigation and Suturing and their skilled assistance was necessary for the success of this case.     Anesthesia: General    Estimated Blood Loss: 5 ML    Implants:    Implant Name Type Inv. Item Serial No.  Lot No. LRB No. Used Action   CLIPAPPLR M/ ENDO LIGACLIP 9 3/8IN  - [id number] Implant CLIPAPPLR M/ ENDO LIGACLIP 9 3/8IN   [employer]  DIV OF J AND J [id number]  2 Implanted       Specimen:          Specimens       ID Source Type Tests Collected By Collected At Frozen?    A Thyroid Tissue TISSUE PATHOLOGY EXAM   Drew Nicole MD 12/5/23 0826 Yes    Description: left thyroid  with isthmus    Comment: Frozen section  or 2  # 1315                  Drains:   Closed/Suction Drain Superior;Midline Neck 10 Fr. (Active)   Dressing Status Clean;Dry;Intact 12/05/23 0944   Drainage Appearance Bloody 12/05/23 0944   Status To bulb suction 12/05/23 0944       Findings:     1.  Left thyroid with isthmus total measurement 4 cm x 5 cm with nodule 2.4 cm x 2.0 cm.  Frozen section revealed colloid nodule.  2.  Right thyroid with no palpable nodules.    3.  Left recurrent laryngeal nerve identified and confirmed with nerve probe then preserved.  4.  Left superior parathyroid gland identified and preserved.  5.  Left inferior parathyroid gland not identified.  6.  Right thyroid and parathyroid glands were not disturbed.  7.  10 [place] Hemovac drain placed with grenade suction  8.  Postop voice normal    Complications:   none    Procedure Description:    Patient was taken to the operating room and general endotracheal anesthesia was performed in supine position using special endotracheal tube with electrodes in place for continuous nerve monitoring.  After patient had been adequately anesthetized and endotracheal tube secured, patient was placed in hyperextension with shoulder roll in place.  Neck was then prepped with Betadine and draped in the usual sterile manner.  Incision line was drawn preoperatively and it was marked again and subsequently infiltrated with 1% Xylocaine with 1-100,000 epinephrine.  Grounding electrodes were placed and hooked up to nerve monitor for continuous nerve monitoring.      Incision was then carried out with a 15 blade followed by electrocautery for hemostasis.  Subplatysmal flap was elevated superiorly and inferiorly.  Midline incision was made through the median raphae and strap muscles were retracted laterally.  Isthmus was identified and dissected in the right lateral isthmus was divided using LigaSure to include the isthmus with the specimen of the left thyroid.  And then subsequently rest of the remaining  thyroid on the left was further dissected superiorly and inferiorly in a blunt fashion.  Inferior pole vessels were ligated and divided using titanium ligaclips were appropriate.  Superior pole vessels were also ligated and divided using titanium ligaclips as well.  Subsequently with the thyroid pulled through the incision and lateral to medial dissection was performed identifying the recurrent laryngeal nerve and confirmed with nerve probe and preserved.  Also superior parathyroid gland was identified and preserved with its vascular supply.  Inferior parathyroid gland was not readily visible however with the delivery of the left thyroid with single nodule was sent for frozen section.  Measurements revealed entire left thyroid with isthmus was measuring 4.0 cm x 1.0 cm with a main nodule being 2.4 cm x 2.0 cm.      While waiting for frozen section, right thyroid was explored and noted that it is without palpable nodules.  Therefore it was decided not to perform right thyroid surgery when the frozen section returned as a benign colloid nodule.  10 [place] drain was placed looped around left side of the neck.  And then midline incision was closed with 4-0 Vicryl in running fashion followed by subcutaneous layer closure using 4-0 Vicryl and 5-0 Vicryl interrupted manner.  And was closed with 6-0 Prolene.  Drain was sutured in place with 2-0 nylon.  Drain was then hooked up to Hemovac grenade suction.  Patient was subsequently extubated and transported to recovery room in good condition.    Drew Nicole MD     Date: 12/5/2023  Time: 09:57 EST

## 2023-12-05 NOTE — ANESTHESIA POSTPROCEDURE EVALUATION
Patient: Leilani Ordaz    Procedure Summary       Date: 12/05/23 Room / Location: [facility] OR 02 / [facility] MAIN OR    Anesthesia Start: 0737 Anesthesia Stop: 0938    Procedure: LEFT THYROIDECTOMY with isthmusectomy and FROZEN SECTION, , RECURRENT LARYNGEAL NERVE MONITORING (Bilateral: Neck) Diagnosis:       Left thyroid nodule      Follicular neoplasm of thyroid      Colloid thyroid nodule      (Left thyroid nodule [E04.1])      (Follicular neoplasm of thyroid [D49.7])    Surgeons: Drew Nicole MD Provider: Martir Echevarria MD    Anesthesia Type: general ASA Status: 3            Anesthesia Type: general    Vitals  Vitals Value Taken Time   /59 12/05/23 1023   Temp 36.1 °C (97 °F) 12/05/23 0940   Pulse 64 12/05/23 1023   Resp 16 12/05/23 0955   SpO2 97 % 12/05/23 1023   Vitals shown include unfiled device data.        Anesthesia Post Evaluation

## 2023-12-05 NOTE — ANESTHESIA PREPROCEDURE EVALUATION
Anesthesia Evaluation     Patient summary reviewed and Nursing notes reviewed   no history of anesthetic complications:   NPO Solid Status: > 8 hours  NPO Liquid Status: > 2 hours           Airway   Mallampati: II  TM distance: >3 FB  Neck ROM: full  No difficulty expected  Dental      Pulmonary - negative pulmonary ROS and normal exam    breath sounds clear to auscultation  Cardiovascular - normal exam  Exercise tolerance: good (4-7 METS)    Rhythm: regular  Rate: normal    (+) hypertension, CAD, hyperlipidemia      Neuro/Psych  (+) seizures well controlled, numbness  GI/Hepatic/Renal/Endo    (+) GERD well controlled, renal disease-, diabetes mellitus type 2, thyroid problem hypothyroidism    Musculoskeletal     (+) myalgias  Abdominal    Substance History - negative use     OB/GYN negative ob/gyn ROS         Other - negative ROS       ROS/Med Hx Other: >4METS, HX CAD,HTN,DM. ECHO 7/23 EF 67.2%,TRACE TR, CALCIFIC AV WITHOUT STENOSIS, STRESS 6/23 NEG FOR ISCHEMIA. CT 6/6/23 THYROID NODULE. FOLLOWS PCP, LAST OV 6/14/23 F/U PRN.               Anesthesia Plan    ASA 3     general     (Patient understands anesthesia not responsible for dental damage.)  intravenous induction     Anesthetic plan, risks, benefits, and alternatives have been provided, discussed and informed consent has been obtained with: patient.  Pre-procedure education provided  Plan discussed with CRNA.    CODE STATUS:

## 2023-12-06 ENCOUNTER — READMISSION MANAGEMENT (OUTPATIENT)
Dept: CALL CENTER | Facility: HOSPITAL | Age: 68
End: 2023-12-06
Payer: MEDICARE

## 2023-12-06 VITALS
DIASTOLIC BLOOD PRESSURE: 44 MMHG | HEIGHT: 64 IN | SYSTOLIC BLOOD PRESSURE: 116 MMHG | BODY MASS INDEX: 28.34 KG/M2 | HEART RATE: 59 BPM | WEIGHT: 166.01 LBS | TEMPERATURE: 98.1 F | OXYGEN SATURATION: 97 % | RESPIRATION RATE: 16 BRPM

## 2023-12-06 PROBLEM — E04.1 LEFT THYROID NODULE: Status: RESOLVED | Noted: 2023-12-05 | Resolved: 2023-12-06

## 2023-12-06 LAB
CALCIUM SPEC-SCNC: 9 MG/DL (ref 8.6–10.5)
CYTO UR: NORMAL
HOLD SPECIMEN: NORMAL
LAB AP CASE REPORT: NORMAL
LAB AP CLINICAL INFORMATION: NORMAL
Lab: NORMAL
PATH REPORT.FINAL DX SPEC: NORMAL
PATH REPORT.GROSS SPEC: NORMAL
PTH-INTACT SERPL-MCNC: 27.3 PG/ML (ref 15–65)
WHOLE BLOOD HOLD SPECIMEN: NORMAL

## 2023-12-06 PROCEDURE — A9270 NON-COVERED ITEM OR SERVICE: HCPCS | Performed by: OTOLARYNGOLOGY

## 2023-12-06 PROCEDURE — G0378 HOSPITAL OBSERVATION PER HR: HCPCS

## 2023-12-06 PROCEDURE — 83970 ASSAY OF PARATHORMONE: CPT | Performed by: OTOLARYNGOLOGY

## 2023-12-06 PROCEDURE — 63710000001 HYDROCODONE-ACETAMINOPHEN 7.5-325 MG TABLET: Performed by: OTOLARYNGOLOGY

## 2023-12-06 PROCEDURE — 63710000001 CHOLECALCIFEROL 25 MCG (1000 UT) TABLET: Performed by: OTOLARYNGOLOGY

## 2023-12-06 PROCEDURE — 63710000001 CALCIUM CARBONATE 1500 (600 CA) MG TABLET: Performed by: OTOLARYNGOLOGY

## 2023-12-06 PROCEDURE — 82310 ASSAY OF CALCIUM: CPT | Performed by: OTOLARYNGOLOGY

## 2023-12-06 PROCEDURE — 63710000001 ATORVASTATIN 40 MG TABLET: Performed by: OTOLARYNGOLOGY

## 2023-12-06 PROCEDURE — 63710000001 CETIRIZINE 10 MG TABLET: Performed by: OTOLARYNGOLOGY

## 2023-12-06 RX ORDER — HYDROCODONE BITARTRATE AND ACETAMINOPHEN 7.5; 325 MG/1; MG/1
1 TABLET ORAL EVERY 4 HOURS PRN
Qty: 20 TABLET | Refills: 0 | Status: SHIPPED | OUTPATIENT
Start: 2023-12-06

## 2023-12-06 RX ORDER — MELOXICAM 15 MG/1
15 TABLET ORAL DAILY
Start: 2023-12-13

## 2023-12-06 RX ORDER — ASPIRIN 81 MG/1
81 TABLET, COATED ORAL DAILY
Qty: 90 TABLET | Refills: 3 | Status: SHIPPED | OUTPATIENT
Start: 2023-12-13

## 2023-12-06 RX ADMIN — SODIUM CHLORIDE 75 ML/HR: 4.5 INJECTION, SOLUTION INTRAVENOUS at 01:56

## 2023-12-06 RX ADMIN — Medication 600 MG: at 08:18

## 2023-12-06 RX ADMIN — CETIRIZINE HYDROCHLORIDE 10 MG: 10 TABLET, FILM COATED ORAL at 08:18

## 2023-12-06 RX ADMIN — HYDROCODONE BITARTRATE AND ACETAMINOPHEN 1 TABLET: 7.5; 325 TABLET ORAL at 01:55

## 2023-12-06 RX ADMIN — ATORVASTATIN CALCIUM 80 MG: 40 TABLET, FILM COATED ORAL at 08:17

## 2023-12-06 RX ADMIN — Medication 1000 UNITS: at 08:18

## 2023-12-06 RX ADMIN — FLUTICASONE PROPIONATE 2 SPRAY: 50 SPRAY, METERED NASAL at 08:18

## 2023-12-06 NOTE — PLAN OF CARE
Goal Outcome Evaluation:      VSS. Pt rested well throughout shift in between care. ANNE MARIE drain removed at bedside by Dr. Nicole at 2115. Wound care completed per MD order. Pain controlled with PRN medication, per MD orders see MAR. Pt educated on proper body mechanics and lifting neck when positioning in bed, understanding verified via teach back method.

## 2023-12-06 NOTE — OUTREACH NOTE
Prep Survey      Flowsheet Row Responses   [facility] patient discharged from? [facility]   Is LACE score < 7 ? Yes   Eligibility [facility]   Date of Admission 12/05/23   Date of Discharge 12/06/23   Discharge diagnosis *Left thyroid noduleLEFT THYROIDECTOMY with isthmusectomy and FROZEN SECTION, , RECURRENT LARYNGEAL NERVE MONITORING   Does the patient have one of the following disease processes/diagnoses(primary or secondary)? General Surgery   Does the patient have Home health ordered? No   Is there a DME ordered? No   Prep survey completed? Yes            FOUZIA MCCULLOUGH - Registered Nurse

## 2023-12-06 NOTE — DISCHARGE SUMMARY
Date of Discharge:  12/6/2023    Discharge Diagnosis:   Same as below    Problem List:  Active Hospital Problems   No active problems to display.      Resolved Hospital Problems    Diagnosis Date Resolved POA    **Left thyroid nodule [E04.1] 12/05/2023 Unknown    Left thyroid nodule [E04.1] 12/06/2023 Yes    Follicular neoplasm of thyroid [D49.7] 12/05/2023 Unknown       Presenting Problem/History of Present Illness  Left thyroid nodule [E04.1]  Follicular neoplasm of thyroid [D49.7]    Hospital Course  Patient is a 68 y.o. female presented with above problems and underwent below procedure uneventfully.  The left thyroid mass was benign on frozen section.  Subsequently patient did well but in the evening the drain pulled out and therefore it was removed uneventfully.  On her first postop day drainage site is unremarkable without the drain.  Incision is also unremarkable.  Her voice is good and she Ch[place]ek's negative.  Therefore patient will be discharged to home without the drain.  She will have follow-up in 1 week for sutures and final pathology.  Her calcium and intact PTH is normal.    Procedures Performed    Procedure(s):  LEFT THYROIDECTOMY with isthmusectomy and FROZEN SECTION, , RECURRENT LARYNGEAL NERVE MONITORING  -------------------       Consults:   Consults       No orders found for last 30 day(s).            Pertinent Test Results: Calcium and intact PTH normal    Condition on Discharge: Stable    Vital Signs  Temp:  [97.5 °F (36.4 °C)-98.9 °F (37.2 °C)] 98.1 °F (36.7 °C)  Heart Rate:  [59-97] 59  Resp:  [12-16] 16  BP: (111-177)/(44-73) 116/44    Discharge Disposition  Home or Self Care    Discharge Medications     Discharge Medications        New Medications        Instructions Start Date   HYDROcodone-acetaminophen 7.5-325 MG per tablet  Commonly known as: NORCO   1 tablet, Oral, Every 4 Hours PRN             Changes to Medications        Instructions Start Date   Aspirin Low Dose 81 MG EC  tablet  Generic drug: aspirin  What changed: These instructions start on December 13, 2023. If you are unsure what to do until then, ask your doctor or other care provider.   81 mg, Oral, Daily   Start Date: December 13, 2023     atorvastatin 80 MG tablet  Commonly known as: LIPITOR  What changed:   when to take this  additional instructions   TAKE ONE TABLET BY MOUTH DAILY FOR CHOLESTEROL      Diclofenac Sodium 1 % gel gel  Commonly known as: VOLTAREN  What changed: These instructions start on December 13, 2023. If you are unsure what to do until then, ask your doctor or other care provider.   4 g, Topical, 4 Times Daily PRN   Start Date: December 13, 2023     meloxicam 15 MG tablet  Commonly known as: MOBIC  What changed:   See the new instructions.  These instructions start on December 13, 2023. If you are unsure what to do until then, ask your doctor or other care provider.   15 mg, Oral, Daily   Start Date: December 13, 2023            Continue These Medications        Instructions Start Date   calcium carbonate 600 MG tablet  Commonly known as: OS-DIANE   600 mg, Oral, Daily      cholecalciferol 25 MCG (1000 UT) tablet  Commonly known as: VITAMIN D3   1,000 Units, Oral, Daily      fish oil 1000 MG capsule capsule   1,000 mg, Oral, 2 Times Daily With Meals      fluticasone 50 MCG/ACT nasal spray  Commonly known as: FLONASE   2 sprays, Nasal, Daily      lisinopril 20 MG tablet  Commonly known as: PRINIVIL,ZESTRIL   20 mg, Oral, Daily      loratadine 10 MG tablet  Commonly known as: Claritin   10 mg, Oral, Daily      metFORMIN 1000 MG tablet  Commonly known as: GLUCOPHAGE   TAKE ONE TABLET BY MOUTH TWICE DAILY               Discharge Diet regular      Activity at Discharge  Activity Instructions       Discharge Activity      1) No driving while taking narcotics.   2) Return to school / work as instructed or when ready  3) May shower after drain hole is sealed  4) Do not lift / push / pull more than 10 lbs.             Follow-up Appointments  Future Appointments   Date Time Provider Department Center   12/18/2023  8:30 AM Js Tavares DO [facility] RAD[facility]   11/6/2024  9:30 AM Dandre Rebolledo DPM [facility] POD ETWN [facility]     Additional Instructions for the Follow-ups that You Need to Schedule       Discharge Follow-up with Specified Provider: Dr. Nicole; 1 Week   As directed      To: Dr. Nicole   Follow Up: 1 Week   Follow Up Details: AS SCHEDULED                Test Results Pending at Discharge  Pending Labs       Order Current Status    Tissue Pathology Exam In process            Drew Nicole MD    Time: 20 minutes

## 2023-12-06 NOTE — SIGNIFICANT NOTE
12/06/23 0900   Plan   Plan ALBIN RN met with patient and spouse at bedside.  Patient provides own IADL's.  Good family support.  Reports no financial needs.  Facesheet verified.  Patient plans to return home with spouse and no needs.  Meds to Beds. Possible discharge home today

## 2023-12-07 ENCOUNTER — TRANSITIONAL CARE MANAGEMENT TELEPHONE ENCOUNTER (OUTPATIENT)
Dept: CALL CENTER | Facility: HOSPITAL | Age: 68
End: 2023-12-07
Payer: MEDICARE

## 2023-12-07 NOTE — OUTREACH NOTE
Call Center TCM Note      Flowsheet Row Responses   [facility] patient discharged from? [facility]   Does the patient have one of the following disease processes/diagnoses(primary or secondary)? General Surgery   TCM attempt successful? Yes   Call start time 1405   Call end time 1408   Discharge diagnosis *Left thyroid noduleLEFT THYROIDECTOMY with isthmusectomy and FROZEN SECTION, , RECURRENT LARYNGEAL NERVE MONITORING   Meds reviewed with patient/caregiver? Yes   Is the patient having any side effects they believe may be caused by any medication additions or changes? No   Does the patient have all medications related to this admission filled (includes all antibiotics, pain medications, etc.) Yes   Is the patient taking all medications as directed (includes completed medication regime)? Yes   Comments PCP FU appt on 12/18/23 830 am. Not listed as HOSP DC FU appt.   Does the patient have an appointment with their PCP within 7-14 days of discharge? Yes   Has home health visited the patient within 72 hours of discharge? N/A   Psychosocial issues? No   Did the patient receive a copy of their discharge instructions? Yes   Nursing interventions Reviewed instructions with patient   What is the patient's perception of their health status since discharge? Improving   Nursing interventions Nurse provided patient education   Is the patient /caregiver able to teach back basic post-op care? No tub bath, swimming, or hot tub until instructed by MD, Take showers only when approved by MD-sponge bathe until then, Lifting as instructed by MD in discharge instructions, Drive as instructed by MD in discharge instructions   Is the patient/caregiver able to teach back signs and symptoms of incisional infection? Increased redness, swelling or pain at the incisonal site, Increased drainage or bleeding, Incisional warmth, Pus or odor from incision, Fever   Is the patient/caregiver able to teach back steps to recovery at home? Set small,  achievable goals for return to baseline health, Rest and rebuild strength, gradually increase activity, Eat a well-balance diet   Is the patient/caregiver able to teach back the hierarchy of who to call/visit for symptoms/problems? PCP, Specialist, Home health nurse, Urgent Care, ED, 911 Yes   TCM call completed? Yes   Wrap up additional comments Pt reports she is doing ok at this time. No needs. Pt wishes to only FU for HOSP DC FU on appt that was already scheduled with PCP. Will Route to office.   Call end time [phone]            Akosua Pineda RN    12/7/2023, 14:08 EST

## 2023-12-13 ENCOUNTER — OFFICE VISIT (OUTPATIENT)
Dept: OTOLARYNGOLOGY | Facility: CLINIC | Age: 68
End: 2023-12-13
Payer: MEDICARE

## 2023-12-13 VITALS
HEIGHT: 64 IN | BODY MASS INDEX: 28.49 KG/M2 | TEMPERATURE: 97.2 F | SYSTOLIC BLOOD PRESSURE: 147 MMHG | HEART RATE: 74 BPM | DIASTOLIC BLOOD PRESSURE: 78 MMHG

## 2023-12-13 DIAGNOSIS — E04.1 COLLOID THYROID NODULE: ICD-10-CM

## 2023-12-13 DIAGNOSIS — E03.9 HYPOTHYROIDISM (ACQUIRED): Primary | ICD-10-CM

## 2023-12-13 PROCEDURE — 99212 OFFICE O/P EST SF 10 MIN: CPT | Performed by: OTOLARYNGOLOGY

## 2023-12-13 PROCEDURE — 1160F RVW MEDS BY RX/DR IN RCRD: CPT | Performed by: OTOLARYNGOLOGY

## 2023-12-13 PROCEDURE — 3077F SYST BP >= 140 MM HG: CPT | Performed by: OTOLARYNGOLOGY

## 2023-12-13 PROCEDURE — 3078F DIAST BP <80 MM HG: CPT | Performed by: OTOLARYNGOLOGY

## 2023-12-13 PROCEDURE — 1159F MED LIST DOCD IN RCRD: CPT | Performed by: OTOLARYNGOLOGY

## 2023-12-13 NOTE — PATIENT INSTRUCTIONS
1.  Patient had left thyroidectomy with isthmusectomy and pathology is benign at this time.  2.  I am going to give her about a month for follow-up with labs to further evaluate thyroid function.

## 2023-12-13 NOTE — PROGRESS NOTES
Patient Name: Leilani Ordaz   Visit Date: 12/13/2023   Patient ID: [id number]  Provider: Drew Nicole MD    Sex: female  [place] [facility]   YOB: 1955  Location Address: [address] [place] [address], [address] [place] [address],?[id number]    Primary Care Provider Js Tavares DO  Location Phone: [phone]    Referring Provider: No ref. provider found        Chief Complaint  1 week THYROIDECTOMY    History of Present Illness  Leilani Ordaz is a 68 y.o. female who presents to [facility] for 1 week THYROIDECTOMY.  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  Frozen Section intraoperatively revealed colloid nodule.  Final pathology today reveals that it is indeed benign colloid nodule.  There was no evidence of parathyroid gland present.  Patient was doing well and she is here to have sutures removed.    Past Medical History:   Diagnosis Date    Allergic     Arthritis     Broken bones     Cataract     Cholelithiasis     Colon polyp     Coronary artery disease     PER PCP NOTE. ECHO/STRESS DONE 2023    DDD (degenerative disc disease), lumbar     L4-L5    Diabetes     Diverticulosis     Esophageal reflux     Fibromyalgia, primary     Ganglion cyst 12/18/2018    Hemorrhoids     HLD (hyperlipidemia)     HTN (hypertension) 12/18/2018    Hyperlipemia     Limb swelling     Low back pain     Migraine     Neuropathy in diabetes 07/01/2019    OR SOONER    Night sweats     Seasonal allergies     Sinus congestion     Sinus trouble     Skin disease     Syncope     Type 2 diabetes mellitus with stage 3 chronic kidney disease, without long-term current use of insulin 12/18/2018       Past Surgical History:   Procedure Laterality Date    COLONOSCOPY  2018    FOLLOW UP IN 5 YEARS (2023)    CYST REMOVAL      FROM THROAT    EYE SURGERY      CATARACT SURGERY    HAND  SURGERY      CALCIUM REMOVED FROM THUMB    THYROIDECTOMY Bilateral 12/5/2023    Procedure: LEFT THYROIDECTOMY with isthmusectomy and FROZEN SECTION, , RECURRENT LARYNGEAL NERVE MONITORING;  Surgeon: Drew Nicole MD;  Location: [facility] MAIN OR;  Service: ENT;  Laterality: Bilateral;         Current Outpatient Medications:     Aspirin Low Dose 81 MG EC tablet, Take 1 tablet by mouth Daily., Disp: 90 tablet, Rfl: 3    atorvastatin (LIPITOR) 80 MG tablet, TAKE ONE TABLET BY MOUTH DAILY FOR CHOLESTEROL (Patient taking differently: Take 1 tablet by mouth Every Night.), Disp: 90 tablet, Rfl: 3    calcium carbonate (OS-DIAEN) 600 MG tablet, Take 1 tablet by mouth Daily., Disp: , Rfl:     Cholecalciferol 25 MCG (1000 UT) tablet, Take 1 tablet by mouth Daily., Disp: , Rfl:     Diclofenac Sodium (VOLTAREN) 1 % gel gel, Apply 4 g topically to the appropriate area as directed 4 (Four) Times a Day As Needed (joint pain)., Disp: 100 g, Rfl: 1    fluticasone (FLONASE) 50 MCG/ACT nasal spray, 2 sprays into the nostril(s) as directed by provider Daily., Disp: 16 g, Rfl: 1    lisinopril (PRINIVIL,ZESTRIL) 20 MG tablet, Take 1 tablet by mouth Daily, Disp: 90 tablet, Rfl: 3    loratadine (Claritin) 10 MG tablet, Take 1 tablet by mouth Daily., Disp: 90 tablet, Rfl: 3    meloxicam (MOBIC) 15 MG tablet, Take 1 tablet by mouth Daily., Disp: , Rfl:     metFORMIN (GLUCOPHAGE) 1000 MG tablet, TAKE ONE TABLET BY MOUTH TWICE DAILY, Disp: 180 tablet, Rfl: 3    Omega-3 Fatty Acids (fish oil) 1000 MG capsule capsule, Take 1 capsule by mouth 2 (Two) Times a Day With Meals., Disp: , Rfl:     HYDROcodone-acetaminophen (NORCO) 7.5-325 MG per tablet, Take 1 tablet by mouth Every 4 (Four) Hours As Needed for Moderate Pain (Pain)., Disp: 20 tablet, Rfl: 0     No Known Allergies    Social History     Tobacco Use    Smoking status: Never    Smokeless tobacco: Never   Vaping Use    Vaping Use: Never used   Substance Use Topics    Alcohol use: Not Currently  "    Comment: LIQUOR BUT VARY RARELY    Drug use: Not Currently        Objective     Vital Signs:   Vitals:    12/13/23 1316   BP: 147/78   BP Location: Left arm   Patient Position: Sitting   Cuff Size: Adult   Pulse: 74   Temp: 97.2 °F (36.2 °C)   TempSrc: Temporal   Height: 162.6 cm (64\")       Tobacco Use: Low Risk  (12/13/2023)    Patient History     Smoking Tobacco Use: Never     Smokeless Tobacco Use: Never     Passive Exposure: Not on file         Physical Exam    Constitutional   Appearance  well developed, well-nourished, alert and in no acute distress, voice clear and strong    Head   Inspection  no deformities or lesions      Face   Inspection  no facial lesions; House-Brackmann I/VI bilaterally   Palpation  no TMJ crepitus nor  muscle tenderness bilaterally     Eyes/Vision   Visual Fields  extraocular movements are intact, no spontaneous or gaze-induced nystagmus  Conjunctivae  clear   Sclerae  clear   Pupils and Irises  pupils equal, round, and reactive to light.   Nystagmus  not present     Ears, Nose, Mouth and Throat  Ears  External Ears  appearance within normal limits, no lesions present   Otoscopic Examination  tympanic membrane appearance within normal limits bilaterally without perforations, well-aerated middle ears   Hearing  intact to conversational voice both ears   Tunning fork testing    Rinne:  Lee:    Nose  External Nose  appearance normal   Intranasal Exam  mucosa within normal limits, vestibules normal, no intranasal lesions present, septum midline, sinuses non tender to percussion   Modified Berhane Test:    Oral Cavity  Oral Mucosa  oral mucosa normal without pallor or cyanosis   Lips  lip appearance normal   Teeth  normal dentition for age   Gums  gums pink, non-swollen, no bleeding present   Tongue  tongue appearance normal; normal mobility   Palate  hard palate normal, soft palate appearance normal with symmetric mobility     Throat  Oropharynx  no inflammation or lesions " present, tonsils within normal limits   Hypopharynx  appearance within normal limits   Larynx  voice normal     Neck  Inspection/Palpation  Incision line is healing well.  All sutures are removed unremarkably.    Lymphatic  Neck  no lymphadenopathy present   Supraclavicular Nodes  no lymphadenopathy present   Preauricular Nodes  no lymphadenopathy present     Respiratory  Respiratory Effort  breathing unlabored   Inspection of Chest  normal appearance, no retractions     Musculoskeletal   Cervical back: Normal range of motion and neck supple.      Skin and Subcutaneous Tissue  General Inspection  Regarding face and neck - there are no rashes present, no lesions present, and no areas of discoloration     Neurologic  Cranial Nerves  cranial nerves II-XII are grossly intact bilaterally   Gait and Station  normal gait, able to stand without diffculty    Psychiatric  Judgement and Insight  judgment and insight intact   Mood and Affect  mood normal, affect appropriate       RESULTS REVIEWED    I have reviewed the following information:   [x]  Previous Internal Note  []  Previous External Note:   [x]  Ordered Tests & Results:      Pathology:   Lab Results   Component Value Date    CASEREPORT  12/05/2023     Surgical Pathology Report                         Case: [id number]                                  Authorizing Provider:  Drew Nicole MD         Collected:           12/05/2023 08:54 AM          Ordering Location:     [facility] MAIN Received:            12/05/2023 09:24 AM                                 OR                                                                           Pathologist:           Lona Matta MD                                                     Specimen:    Thyroid, left thyroid  with isthmus                                                        CLININFO  12/05/2023     Left thyroid nodule        FINALDX  12/05/2023     Thyroid gland, left lobe and isthmus, left  "hemithyroidectomy:   -Colloid nodule   -Parathyroid tissue not identified   -Negative for malignancy      GROSSDES  12/05/2023     1. Thyroid.  Received fresh for intraoperative consultation (frozen section and touch preparation performed) labeled \"left thyroid with isthmus\" is a 10 g, 4.0 x 3.5 x 1.1 cm left hemithyroidectomy specimen received with a cauterized isthmus margin (inked blue).  No parathyroid glands are identified.  The purple-gray, shaggy capsule (inked black) is mostly complete.  Serially sectioning from superior to inferior reveals a 1.6 x 1.5 x 1.0 cm tan, well-circumscribed nodule in the lower lobe, located 2.5 cm from the isthmus margin.  The remaining uninvolved parenchyma is dark red soft.  A section is frozen and subsequently submitted with additional sections as follows: 1A-frozen remnant; 1B-section of uninvolved upper pole and section of nodule; 1C-nodule; 1D-remainder of nodule with section of nearest isthmus margin, perpendicular. LUZ MARIA      MICRO  12/05/2023     Microscopic examination performed.         TSH   Date Value Ref Range Status   10/09/2023 2.580 0.270 - 4.200 uIU/mL Final     PTH, Intact   Date Value Ref Range Status   12/06/2023 27.3 15.0 - 65.0 pg/mL Final     Calcium   Date Value Ref Range Status   12/06/2023 9.0 8.6 - 10.5 mg/dL Final     25 Hydroxy, Vitamin D   Date Value Ref Range Status   05/02/2019 35.2 30.0 - 100.0 ng/mL Final     Comment:     Vitamin D Status          Range  ---------------------------------------  Deficiency                <10 ng/mL  Insufficiency             10-30 ng/mL  Sufficiency                ng/mL  Toxicity                  >100 ng/mL         XR Knee 3 View Right    Result Date: 10/25/2023    Tricompartmental osteoarthritis with moderate joint space narrowing.       DEBBY RATLIFF MD       Electronically Signed and Approved By: DEBBY RATLIFF MD on 10/25/2023 at 13:59             US Fine Needle Aspiration BX 1st Lesion    Result Date: " 9/28/2023    1. Technically successful fine needle aspiration of the suspected 1.7 cm nodular density in the inferior left thyroid lobe.      Paul Foy M.D.       Electronically Signed and Approved By: Paul Foy M.D. on 9/28/2023 at 11:37               I have discussed the interpretation of the above results with the patient.    Procedures          Assessment and Plan   Diagnoses and all orders for this visit:    1. Hypothyroidism (acquired) (Primary)  -     TSH+Free T4; Future  -     T3, Free; Future    2. Colloid thyroid nodule        Leilani Ordaz  reports that she has never smoked. She has never used smokeless tobacco. I have educated her on the risk of diseases from using tobacco products such as Cancer, COPD & Heart Disease.     Plan:  Patient Instructions   1.  Patient had left thyroidectomy with isthmusectomy and pathology is benign at this time.  2.  I am going to give her about a month for follow-up with labs to further evaluate thyroid function.     15 minutes were spent caring for Leilani on this date of service. This time spent by me includes preparing for the visit, reviewing tests, obtaining/reviewing separately obtained history, performing medically appropriate exam/evaluation, counseling/educating the patient/family/caregiver, ordering medications/tests/procedures, referring/communicating with other health care professionals, documenting information in the medical record, independently interpreting results and communicating that with the patient/family/caregiver and/or care coordination.       Follow Up   Return in about 1 month (around 1/13/2024), or Follow-up with lab.  Patient was given instructions and counseling regarding her condition or for health maintenance advice. Please see specific information pulled into the AVS if appropriate.

## 2023-12-18 ENCOUNTER — OFFICE VISIT (OUTPATIENT)
Dept: FAMILY MEDICINE CLINIC | Facility: CLINIC | Age: 68
End: 2023-12-18
Payer: MEDICARE

## 2023-12-18 VITALS
SYSTOLIC BLOOD PRESSURE: 140 MMHG | HEIGHT: 65 IN | OXYGEN SATURATION: 97 % | TEMPERATURE: 98.6 F | DIASTOLIC BLOOD PRESSURE: 78 MMHG | HEART RATE: 88 BPM | WEIGHT: 166.4 LBS | BODY MASS INDEX: 27.72 KG/M2

## 2023-12-18 DIAGNOSIS — E55.9 VITAMIN D DEFICIENCY: ICD-10-CM

## 2023-12-18 DIAGNOSIS — E11.65 TYPE 2 DIABETES MELLITUS WITH HYPERGLYCEMIA, WITHOUT LONG-TERM CURRENT USE OF INSULIN: ICD-10-CM

## 2023-12-18 DIAGNOSIS — J30.9 ALLERGIC RHINITIS, UNSPECIFIED SEASONALITY, UNSPECIFIED TRIGGER: ICD-10-CM

## 2023-12-18 DIAGNOSIS — K21.9 GASTROESOPHAGEAL REFLUX DISEASE, UNSPECIFIED WHETHER ESOPHAGITIS PRESENT: ICD-10-CM

## 2023-12-18 DIAGNOSIS — Z00.00 MEDICARE ANNUAL WELLNESS VISIT, SUBSEQUENT: Primary | ICD-10-CM

## 2023-12-18 DIAGNOSIS — K80.20 CALCULUS OF GALLBLADDER WITHOUT CHOLECYSTITIS WITHOUT OBSTRUCTION: ICD-10-CM

## 2023-12-18 DIAGNOSIS — E89.0 POSTOPERATIVE HYPOTHYROIDISM: ICD-10-CM

## 2023-12-18 DIAGNOSIS — M85.80 OSTEOPENIA, UNSPECIFIED LOCATION: ICD-10-CM

## 2023-12-18 DIAGNOSIS — E78.2 MIXED HYPERLIPIDEMIA: ICD-10-CM

## 2023-12-18 DIAGNOSIS — N18.31 STAGE 3A CHRONIC KIDNEY DISEASE: ICD-10-CM

## 2023-12-18 DIAGNOSIS — E04.1 COLLOID THYROID NODULE: ICD-10-CM

## 2023-12-18 DIAGNOSIS — I10 ESSENTIAL HYPERTENSION: ICD-10-CM

## 2023-12-18 RX ORDER — DAPAGLIFLOZIN 10 MG/1
10 TABLET, FILM COATED ORAL DAILY
Qty: 90 TABLET | Refills: 1 | Status: SHIPPED | OUTPATIENT
Start: 2023-12-18

## 2023-12-18 RX ORDER — MONTELUKAST SODIUM 10 MG/1
10 TABLET ORAL NIGHTLY
Qty: 90 TABLET | Refills: 3 | Status: SHIPPED | OUTPATIENT
Start: 2023-12-18

## 2023-12-18 NOTE — PROGRESS NOTES
The ABCs of the Annual Wellness Visit  Subsequent Medicare Wellness Visit    Subjective    Leilani Ordaz is a 68 y.o. female who presents for a Subsequent Medicare Wellness Visit.    The following portions of the patient's history were reviewed and   updated as appropriate: allergies, current medications, past family history, past medical history, past social history, past surgical history, and problem list.    Compared to one year ago, the patient feels her physical   health is the same.    Compared to one year ago, the patient feels her mental   health is better.    Recent Hospitalizations:  She was admitted within the past 365 days at [facility].       Current Medical Providers:  Patient Care Team:  Js Tavares DO as PCP - General (Family Medicine)    Outpatient Medications Prior to Visit   Medication Sig Dispense Refill    Aspirin Low Dose 81 MG EC tablet Take 1 tablet by mouth Daily. 90 tablet 3    atorvastatin (LIPITOR) 80 MG tablet TAKE ONE TABLET BY MOUTH DAILY FOR CHOLESTEROL (Patient taking differently: Take 1 tablet by mouth Every Night.) 90 tablet 3    calcium carbonate (OS-DIANE) 600 MG tablet Take 1 tablet by mouth Daily.      Cholecalciferol 25 MCG (1000 UT) tablet Take 1 tablet by mouth Daily.      Diclofenac Sodium (VOLTAREN) 1 % gel gel Apply 4 g topically to the appropriate area as directed 4 (Four) Times a Day As Needed (joint pain). 100 g 1    fluticasone (FLONASE) 50 MCG/ACT nasal spray 2 sprays into the nostril(s) as directed by provider Daily. 16 g 1    HYDROcodone-acetaminophen (NORCO) 7.5-325 MG per tablet Take 1 tablet by mouth Every 4 (Four) Hours As Needed for Moderate Pain (Pain). 20 tablet 0    lisinopril (PRINIVIL,ZESTRIL) 20 MG tablet Take 1 tablet by mouth Daily 90 tablet 3    loratadine (Claritin) 10 MG tablet Take 1 tablet by mouth Daily. 90 tablet 3    meloxicam (MOBIC) 15 MG tablet Take 1 tablet by mouth Daily.      metFORMIN (GLUCOPHAGE) 1000 MG  "tablet TAKE ONE TABLET BY MOUTH TWICE DAILY 180 tablet 3    Omega-3 Fatty Acids (fish oil) 1000 MG capsule capsule Take 1 capsule by mouth 2 (Two) Times a Day With Meals.       No facility-administered medications prior to visit.       Opioid medication/s are on active medication list.  and I have evaluated her active treatment plan and pain score trends (see table).  There were no vitals filed for this visit.  I have reviewed the chart for potential of high risk medication and harmful drug interactions in the elderly.          Aspirin is on active medication list. Aspirin use is indicated based on review of current medical condition/s. Pros and cons of this therapy have been discussed today. Benefits of this medication outweigh potential harm.  Patient has been encouraged to continue taking this medication.  .      Patient Active Problem List   Diagnosis    Type 2 diabetes mellitus with stage 3 chronic kidney disease, without long-term current use of insulin    Seizure    Seasonal allergic rhinitis    Migraine    Hypertension    Hyperlipidemia    Ganglion cyst    Diverticulosis    Cataract    Arthritis    Esophageal reflux    Chronic bilateral low back pain with right-sided sciatica    Renal cyst, right    Colon cancer screening    Encounter for screening for malignant neoplasm of colon    History of colon polyps    Colloid thyroid nodule     Advance Care Planning   Advance Care Planning     Advance Directive is not on file.  ACP discussion was held with the patient during this visit. Does not have one.     Objective    Vitals:    12/18/23 0840   BP: 140/78   BP Location: Left arm   Patient Position: Sitting   Pulse: 88   Temp: 98.6 °F (37 °C)   TempSrc: Oral   SpO2: 97%   Weight: 75.5 kg (166 lb 6.4 oz)   Height: 165.1 cm (65\")     Estimated body mass index is 27.69 kg/m² as calculated from the following:    Height as of this encounter: 165.1 cm (65\").    Weight as of this encounter: 75.5 kg (166 lb 6.4 oz).     "       Does the patient have evidence of cognitive impairment? No    Lab Results   Component Value Date    TRIG 119 10/19/2023    HDL 41 10/19/2023    LDL 77 10/19/2023    VLDL 22 10/19/2023    HGBA1C 6.30 (H) 10/19/2023        HEALTH RISK ASSESSMENT    Smoking Status:  Social History     Tobacco Use   Smoking Status Never   Smokeless Tobacco Never     Alcohol Consumption:  Social History     Substance and Sexual Activity   Alcohol Use Not Currently    Comment: LIQUOR BUT VARY RARELY     Fall Risk Screen:    DB Fall Risk Assessment was completed, and patient is at MODERATE risk for falls. Assessment completed on:2023    Depression Screenin/18/2023     8:44 AM   PHQ-2/PHQ-9 Depression Screening   Little Interest or Pleasure in Doing Things 0-->not at all   Feeling Down, Depressed or Hopeless 0-->not at all   PHQ-9: Brief Depression Severity Measure Score 0       Health Habits and Functional and Cognitive Screenin/18/2023     8:42 AM   Functional & Cognitive Status   Do you have difficulty preparing food and eating? No   Do you have difficulty bathing yourself, getting dressed or grooming yourself? No   Do you have difficulty using the toilet? No   Do you have difficulty moving around from place to place? No   Do you have trouble with steps or getting out of a bed or a chair? No   Current Diet Well Balanced Diet   Dental Exam Up to date   Eye Exam Up to date   Exercise (times per week) 3 times per week   Current Exercises Include Walking   Do you need help using the phone?  No   Are you deaf or do you have serious difficulty hearing?  No   Do you need help to go to places out of walking distance? No   Do you need help shopping? No   Do you need help preparing meals?  No   Do you need help with housework?  No   Do you need help with laundry? No   Do you need help taking your medications? No   Do you need help managing money? No   Do you ever drive or ride in a car without wearing a seat  belt? No   Have you felt unusual stress, anger or loneliness in the last month? No   Who do you live with? Spouse   If you need help, do you have trouble finding someone available to you? No   Have you been bothered in the last four weeks by sexual problems? No   Do you have difficulty concentrating, remembering or making decisions? No       Age-appropriate Screening Schedule:  Refer to the list below for future screening recommendations based on patient's age, sex and/or medical conditions. Orders for these recommended tests are listed in the plan section. The patient has been provided with a written plan.    Health Maintenance   Topic Date Due    ZOSTER VACCINE (1 of 2) Never done    COLORECTAL CANCER SCREENING  07/23/2023    COVID-19 Vaccine (3 - 2023-24 season) 09/01/2023    INFLUENZA VACCINE  03/31/2024 (Originally 8/1/2023)    HEMOGLOBIN A1C  04/19/2024    BMI FOLLOWUP  04/24/2024    DIABETIC EYE EXAM  07/12/2024    LIPID PANEL  10/19/2024    URINE MICROALBUMIN  10/19/2024    DIABETIC FOOT EXAM  11/06/2024    ANNUAL WELLNESS VISIT  12/18/2024    MAMMOGRAM  04/13/2025    DXA SCAN  07/25/2025    TDAP/TD VACCINES (2 - Td or Tdap) 04/24/2033    HEPATITIS C SCREENING  Completed    Pneumococcal Vaccine 65+  Completed                  CMS Preventative Services Quick Reference  Risk Factors Identified During Encounter  Polypharmacy: Medication List reviewed  The above risks/problems have been discussed with the patient.  Pertinent information has been shared with the patient in the After Visit Summary.  An After Visit Summary and PPPS were made available to the patient.    Follow Up:   Next Medicare Wellness visit to be scheduled in 1 year.       Additional E&M Note during same encounter follows:  Patient has multiple medical problems which are significant and separately identifiable that require additional work above and beyond the Medicare Wellness Visit.      Chief Complaint  Medicare  Wellness-subsequent    Subjective        HPI  Patient presents today for Medicare annual wellness visit.  Intake questions have been reviewed.  She did have a fall when she was tripped up by one of her friends.  She denies feeling off balance before this occurred.  She reports doing well otherwise.  She did have surgery on 12/5/2023 with Dr. Nicole.  This included a left thyroidectomy and isthmusectomy and recurrent laryngeal nerve monitoring.  She was kept overnight and discharged the next day.  She denies having received transitional care management in this regard.  She is being followed by ENT and is planning on having labs drawn next month for follow-up.  She does have a slight lump under her incision from surgery.  Denies that it causes any pain for her.  Her incision is healing well.  Patient does have chronic kidney disease.  She was previously noted on CT of the abdomen pelvis to have a 1 cm simple cyst of her right kidney superior pole.  She previously had a microalbumin to creatinine level checked which was 9.3.  I did discuss with her options to help manage her chronic kidney disease.  We did discuss sending in Farxiga today.  I did discuss with patient giving her samples as well today.  We discussed starting at the 10 mg dose.  As far as her thyroid is concerned that she had a colloid nodule which is benign.  Parathyroid hormone and calcium levels were normal.  She would also like to discuss issues with her gallbladder.  She was previously known to have cholelithiasis.  CT of the chest back on 6/6/2023 showed cholelithiasis as well.  She is having some issues with right upper quadrant pain after eating a fatty meal.  She has had more issues with acid reflux as well but reports that this has been under control taking Tums.  She reports that the pain will be in the right upper quadrant but sometimes will radiate up into her right shoulder area.  I did discuss with patient options today.  Given her symptoms I  "did recommend that she see a general surgeon but she reports that her symptoms are tolerable at this time and would like to hold off.  She still has issues with allergies.  She is currently taking Claritin and Flonase.  We discussed adding Singulair today.         Objective   Vital Signs:  /78 (BP Location: Left arm, Patient Position: Sitting)   Pulse 88   Temp 98.6 °F (37 °C) (Oral)   Ht 165.1 cm (65\")   Wt 75.5 kg (166 lb 6.4 oz)   SpO2 97%   BMI 27.69 kg/m²     Physical Exam  Vitals reviewed.   Constitutional:       Appearance: Normal appearance.   HENT:      Head: Normocephalic and atraumatic.      Right Ear: Tympanic membrane, ear canal and external ear normal.      Left Ear: Tympanic membrane, ear canal and external ear normal.      Nose: Congestion present.      Mouth/Throat:      Pharynx: No oropharyngeal exudate.   Eyes:      Conjunctiva/sclera: Conjunctivae normal.   Neck:      Comments: Incision is healing well.  She does have a lump noted underneath the incision.  Cardiovascular:      Rate and Rhythm: Normal rate and regular rhythm.      Heart sounds: No murmur heard.     No friction rub. No gallop.   Pulmonary:      Effort: Pulmonary effort is normal.      Breath sounds: Normal breath sounds. No wheezing or rhonchi.   Abdominal:      General: Bowel sounds are normal. There is no distension.      Comments: Mild tenderness to deep palpation in the right upper quadrant region.   Skin:     General: Skin is warm and dry.   Neurological:      Mental Status: She is alert and oriented to person, place, and time.      Cranial Nerves: No cranial nerve deficit.   Psychiatric:         Mood and Affect: Mood and affect normal.         Behavior: Behavior normal.         Thought Content: Thought content normal.         Judgment: Judgment normal.                         Assessment and Plan   Diagnoses and all orders for this visit:    1. Medicare annual wellness visit, subsequent (Primary)    2. Colloid " thyroid nodule    3. Postoperative hypothyroidism  -     TSH+Free T4; Future    4. Osteopenia, unspecified location    5. Stage 3a chronic kidney disease    6. Type 2 diabetes mellitus with hyperglycemia, without long-term current use of insulin  -     CBC & Differential; Future  -     Comprehensive Metabolic Panel; Future  -     Hemoglobin A1c; Future    7. Essential hypertension  -     CBC & Differential; Future  -     Comprehensive Metabolic Panel; Future    8. Calculus of gallbladder without cholecystitis without obstruction    9. Gastroesophageal reflux disease, unspecified whether esophagitis present    10. Allergic rhinitis, unspecified seasonality, unspecified trigger    11. Mixed hyperlipidemia  -     Lipid Panel; Future    12. Vitamin D deficiency  -     Vitamin D,25-Hydroxy; Future    Other orders  -     montelukast (Singulair) 10 MG tablet; Take 1 tablet by mouth Every Night.  Dispense: 90 tablet; Refill: 3  -     dapagliflozin Propanediol (Farxiga) 10 MG tablet; Take 10 mg by mouth Daily.  Dispense: 90 tablet; Refill: 1    Plan as documented above.  I did discuss with patient seeing her back in 3 months.  We discussed adding Farxiga today for treatment of chronic kidney disease as well as to help continue manage her diabetes.  Her last A1c back in October was 6.3% which has been stable.  She does continue to take metformin so we discussed continuing to take at 1000 mg twice daily.  Patient to follow-up again with ENT in regards to her thyroid surgery.  She does have a small subcutaneous knot noted.  This may be resolving hematoma.  I recommend having her follow-up again with ENT.         Follow Up   Return in about 3 months (around 3/18/2024) for hypertension.  Patient was given instructions and counseling regarding her condition or for health maintenance advice. Please see specific information pulled into the AVS if appropriate.

## 2024-01-08 ENCOUNTER — LAB (OUTPATIENT)
Dept: LAB | Facility: HOSPITAL | Age: 69
End: 2024-01-08
Payer: MEDICARE

## 2024-01-08 ENCOUNTER — TELEPHONE (OUTPATIENT)
Dept: FAMILY MEDICINE CLINIC | Facility: CLINIC | Age: 69
End: 2024-01-08
Payer: COMMERCIAL

## 2024-01-08 DIAGNOSIS — E03.9 HYPOTHYROIDISM (ACQUIRED): ICD-10-CM

## 2024-01-08 LAB
T3FREE SERPL-MCNC: 2.27 PG/ML (ref 2–4.4)
T4 FREE SERPL-MCNC: 1.15 NG/DL (ref 0.93–1.7)
TSH SERPL DL<=0.05 MIU/L-ACNC: 2.43 UIU/ML (ref 0.27–4.2)

## 2024-01-08 PROCEDURE — 84443 ASSAY THYROID STIM HORMONE: CPT

## 2024-01-08 PROCEDURE — 84439 ASSAY OF FREE THYROXINE: CPT

## 2024-01-08 PROCEDURE — 84481 FREE ASSAY (FT-3): CPT

## 2024-01-08 PROCEDURE — 36415 COLL VENOUS BLD VENIPUNCTURE: CPT

## 2024-01-08 RX ORDER — DAPAGLIFLOZIN 10 MG/1
10 TABLET, FILM COATED ORAL DAILY
Qty: 90 TABLET | Refills: 3 | Status: SHIPPED | OUTPATIENT
Start: 2024-01-08

## 2024-01-08 NOTE — TELEPHONE ENCOUNTER
Caller: IRMA ESQUIVEL    Relationship:PATIENT     Callback number: [phone]   Is it ok to leave a message: [x] Yes [] No    Requested medication for samples: FARXIGA 10MG    How much medication does the patient currently have left: 1 PILL LEFT FOR THIS WEEK    Who will be picking up the samples: PATIENT    Do you need information about patient financial assistance for this medication: [x] Yes [] No    Additional details provided: PATIENT WENT TO PHARMACY TO PICK THIS MEDICATION UP AND IT WAS GOING TO COST HER MORE THAN SHE COULD AFFORD. PLEASE CALL PATIENT BACK IF SAMPLES ARE AVAILABLE. IF NOT, PLEASE CALL PATIENT BACK AND ADVISE ABOUT AN ALTERNATIVE MEDICATION TO TAKE THE PLACE OF THE FARXIGA.

## 2024-01-15 ENCOUNTER — OFFICE VISIT (OUTPATIENT)
Dept: OTOLARYNGOLOGY | Facility: CLINIC | Age: 69
End: 2024-01-15
Payer: COMMERCIAL

## 2024-01-15 VITALS — TEMPERATURE: 96.7 F | WEIGHT: 169.8 LBS | BODY MASS INDEX: 28.29 KG/M2 | HEIGHT: 65 IN

## 2024-01-15 DIAGNOSIS — E03.9 HYPOTHYROIDISM (ACQUIRED): Primary | ICD-10-CM

## 2024-01-15 PROCEDURE — 99213 OFFICE O/P EST LOW 20 MIN: CPT | Performed by: OTOLARYNGOLOGY

## 2024-01-15 NOTE — PROGRESS NOTES
Patient Name: Leilani Ordaz   Visit Date: 01/15/2024   Patient ID: [id number]  Provider: Drew Nicole MD    Sex: female  Location: [facility]   YOB: 1955  Location Address: [address] [place] [address], [address] [place] [address],?[place]?[id number]    Primary Care Provider Js Tavares DO  Location Phone: [phone]    Referring Provider: No ref. provider found        Chief Complaint  LAB RESULTS    History of Present Illness  Leilani Ordaz is a 68 y.o. female who presents to [facility] for LAB RESULTS.  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  Frozen Section intraoperatively revealed colloid nodule.  Final pathology today reveals that it is indeed benign colloid nodule.  There was no evidence of parathyroid gland present.  Patient is here for 1 month follow-up and doing very well.  Otherwise patient's voice is good.      Past Medical History:   Diagnosis Date    Allergic     Arthritis     Broken bones     Cataract     Cholelithiasis     Colon polyp     Coronary artery disease     PER PCP NOTE. ECHO/STRESS DONE 2023    DDD (degenerative disc disease), lumbar     L4-L5    Diabetes     Diverticulosis     Esophageal reflux     Fibromyalgia, primary     Ganglion cyst 12/18/2018    Hemorrhoids     HLD (hyperlipidemia)     HTN (hypertension) 12/18/2018    Hyperlipemia     Limb swelling     Low back pain     Migraine     Neuropathy in diabetes 07/01/2019    OR SOONER    Night sweats     Seasonal allergies     Sinus congestion     Sinus trouble     Skin disease     Syncope     Type 2 diabetes mellitus with stage 3 chronic kidney disease, without long-term current use of insulin 12/18/2018       Past Surgical History:   Procedure Laterality Date    COLONOSCOPY  2018    FOLLOW UP IN 5 YEARS (2023)    CYST REMOVAL      FROM THROAT    EYE SURGERY      CATARACT  SURGERY    HAND SURGERY      CALCIUM REMOVED FROM THUMB    THYROIDECTOMY Bilateral 12/5/2023    Procedure: LEFT THYROIDECTOMY with isthmusectomy and FROZEN SECTION, , RECURRENT LARYNGEAL NERVE MONITORING;  Surgeon: Drew Nicole MD;  Location: [facility] MA[facility] OR;  Service: ENT;  Laterality: Bilateral;         Current Outpatient Medications:     Aspirin Low Dose 81 MG EC tablet, Take 1 tablet by mouth Daily., Disp: 90 tablet, Rfl: 3    atorvastatin (LIPITOR) 80 MG tablet, TAKE ONE TABLET BY MOUTH DAILY FOR CHOLESTEROL (Patient taking differently: Take 1 tablet by mouth Every Night.), Disp: 90 tablet, Rfl: 3    calcium carbonate (OS-DIANE) 600 MG tablet, Take 1 tablet by mouth Daily., Disp: , Rfl:     Cholecalciferol 25 MCG (1000 UT) tablet, Take 1 tablet by mouth Daily., Disp: , Rfl:     dapagliflozin Propanediol (Farxiga) 10 MG tablet, Take 10 mg by mouth Daily., Disp: 90 tablet, Rfl: 3    Diclofenac Sodium (VOLTAREN) 1 % gel gel, Apply 4 g topically to the appropriate area as directed 4 (Four) Times a Day As Needed (joint pain)., Disp: 100 g, Rfl: 1    fluticasone (FLONASE) 50 MCG/ACT nasal spray, 2 sprays into the nostril(s) as directed by provider Daily., Disp: 16 g, Rfl: 1    HYDROcodone-acetaminophen (NORCO) 7.5-325 MG per tablet, Take 1 tablet by mouth Every 4 (Four) Hours As Needed for Moderate Pain (Pain)., Disp: 20 tablet, Rfl: 0    lisinopril (PRINIVIL,ZESTRIL) 20 MG tablet, Take 1 tablet by mouth Daily, Disp: 90 tablet, Rfl: 3    loratadine (Claritin) 10 MG tablet, Take 1 tablet by mouth Daily., Disp: 90 tablet, Rfl: 3    meloxicam (MOBIC) 15 MG tablet, Take 1 tablet by mouth Daily., Disp: , Rfl:     metFORMIN (GLUCOPHAGE) 1000 MG tablet, TAKE ONE TABLET BY MOUTH TWICE DAILY, Disp: 180 tablet, Rfl: 3    montelukast (Singulair) 10 MG tablet, Take 1 tablet by mouth Every Night., Disp: 90 tablet, Rfl: 3    Omega-3 Fatty Acids (fish oil) 1000 MG capsule capsule, Take 1 capsule by mouth 2 (Two) Times a Day With  "Meals., Disp: , Rfl:      No Known Allergies    Social History     Tobacco Use    Smoking status: Never    Smokeless tobacco: Never   Vaping Use    Vaping Use: Never used   Substance Use Topics    Alcohol use: Not Currently     Comment: LIQUOR BUT VARY RARELY    Drug use: Not Currently        Objective     Vital Signs:   Vitals:    01/15/24 1554   Temp: 96.7 °F (35.9 °C)   TempSrc: Temporal   Weight: 77 kg (169 lb 12.8 oz)   Height: 165.1 cm (65\")       Tobacco Use: Low Risk  (1/15/2024)    Patient History     Smoking Tobacco Use: Never     Smokeless Tobacco Use: Never     Passive Exposure: Not on file         Physical Exam    Constitutional   Appearance  well developed, well-nourished, alert and in no acute distress, voice clear and strong    Head   Inspection  no deformities or lesions      Face   Inspection  no facial lesions; House-Brackmann I/VI bilaterally   Palpation  no TMJ crepitus nor  muscle tenderness bilaterally     Eyes/Vision   Visual Fields  extraocular movements are intact, no spontaneous or gaze-induced nystagmus  Conjunctivae  clear   Sclerae  clear   Pupils and Irises  pupils equal, round, and reactive to light.   Nystagmus  not present     Ears, Nose, Mouth and Throat  Ears  External Ears  appearance within normal limits, no lesions present   Otoscopic Examination  tympanic membrane appearance within normal limits bilaterally without perforations, well-aerated middle ears   Hearing  intact to conversational voice both ears   Tunning fork testing    Rinne:  Lee:    Nose  External Nose  appearance normal   Intranasal Exam  mucosa within normal limits, vestibules normal, no intranasal lesions present, septum midline, sinuses non tender to percussion   Modified Bonneville Test:    Oral Cavity  Oral Mucosa  oral mucosa normal without pallor or cyanosis   Lips  lip appearance normal   Teeth  normal dentition for age   Gums  gums pink, non-swollen, no bleeding present   Tongue  tongue " appearance normal; normal mobility   Palate  hard palate normal, soft palate appearance normal with symmetric mobility     Throat  Oropharynx  no inflammation or lesions present, tonsils within normal limits   Hypopharynx  appearance within normal limits   Larynx  voice normal     Neck  Inspection/Palpation  Incision is healing well.    Lymphatic  Neck  no lymphadenopathy present   Supraclavicular Nodes  no lymphadenopathy present   Preauricular Nodes  no lymphadenopathy present     Respiratory  Respiratory Effort  breathing unlabored   Inspection of Chest  normal appearance, no retractions     Musculoskeletal   Cervical back: Normal range of motion and neck supple.      Skin and Subcutaneous Tissue  General Inspection  Regarding face and neck - there are no rashes present, no lesions present, and no areas of discoloration     Neurologic  Cranial Nerves  cranial nerves II-XII are grossly intact bilaterally   Gait and Station  normal gait, able to stand without diffculty    Psychiatric  Judgement and Insight  judgment and insight intact   Mood and Affect  mood normal, affect appropriate       RESULTS REVIEWED    I have reviewed the following information:   [x]  Previous Internal Note  [x]  Previous External Note:   [x]  Ordered Tests & Results:      Pathology:   Lab Results   Component Value Date    CASEREPORT  12/05/2023     Surgical Pathology Report                         Case: [id number]                                  Authorizing Provider:  Drew Nicole MD         Collected:           12/05/2023 08:54 AM          Ordering Location:     [facility] Received:            12/05/2023 09:24 AM                                 OR                                                                           Pathologist:           Lona Matta MD                                                     Specimen:    Thyroid, left thyroid  with isthmus                                                      "   CLININFO  12/05/2023     Left thyroid nodule        FINALDX  12/05/2023     Thyroid gland, left lobe and isthmus, left hemithyroidectomy:   -Colloid nodule   -Parathyroid tissue not identified   -Negative for malignancy      GROSSDES  12/05/2023     1. Thyroid.  Received fresh for intraoperative consultation (frozen section and touch preparation performed) labeled \"left thyroid with isthmus\" is a 10 g, 4.0 x 3.5 x 1.1 cm left hemithyroidectomy specimen received with a cauterized isthmus margin (inked blue).  No parathyroid glands are identified.  The purple-gray, shaggy capsule (inked black) is mostly complete.  Serially sectioning from superior to inferior reveals a 1.6 x 1.5 x 1.0 cm tan, well-circumscribed nodule in the lower lobe, located 2.5 cm from the isthmus margin.  The remaining uninvolved parenchyma is dark red soft.  A section is frozen and subsequently submitted with additional sections as follows: 1A-frozen remnant; 1B-section of uninvolved upper pole and section of nodule; 1C-nodule; 1D-remainder of nodule with section of nearest isthmus margin, perpendicular. LUZ MARIA      MICRO  12/05/2023     Microscopic examination performed.         TSH   Date Value Ref Range Status   01/08/2024 2.430 0.270 - 4.200 uIU/mL Final     T3, Free   Date Value Ref Range Status   01/08/2024 2.27 2.00 - 4.40 pg/mL Final     Free T4   Date Value Ref Range Status   01/08/2024 1.15 0.93 - 1.70 ng/dL Final     Comment:     T4 results may be falsely increased if patient taking Biotin.     PTH, Intact   Date Value Ref Range Status   12/06/2023 27.3 15.0 - 65.0 pg/mL Final     Calcium   Date Value Ref Range Status   12/06/2023 9.0 8.6 - 10.5 mg/dL Final     25 Hydroxy, Vitamin D   Date Value Ref Range Status   05/02/2019 35.2 30.0 - 100.0 ng/mL Final     Comment:     Vitamin D Status          Range  ---------------------------------------  Deficiency                <10 ng/mL  Insufficiency             10-30 ng/mL  Sufficiency      "           ng/mL  Toxicity                  >100 ng/mL         XR Knee 3 View Right    Result Date: 10/25/2023    Tricompartmental osteoarthritis with moderate joint space narrowing.       DEBBY RATLIFF MD       Electronically Signed and Approved By: DEBBY RATLIFF MD on 10/25/2023 at 13:59             US Fine Needle Aspiration BX 1st Lesion    Result Date: 9/28/2023    1. Technically successful fine needle aspiration of the suspected 1.7 cm nodular density in the inferior left thyroid lobe.      Paul Foy M.D.       Electronically Signed and Approved By: Paul Foy M.D. on 9/28/2023 at 11:37               I have  discussed the interpretation of the above results with the patient.    Procedures          Assessment and Plan   Diagnoses and all orders for this visit:    1. Hypothyroidism (acquired) (Primary)        Leilani Ordaz  reports that she has never smoked. She has never used smokeless tobacco.     Plan:  Patient Instructions   1.  Patient had thyroid lobectomy and follow-up with thyroid function test shows all within normal limits.  At this time I do not think patient needs any supplementation.  2.  I am going to order another set of labs in 3 months and see if it still continues to maintain his level then I would not worry about it any further for supplementation.  3.  Patient was asked to continue to massage to scar tissue hopefully skin will relax from the trachea.  4.  Follow-up 3 months with lab     21 minutes were spent caring for Leilani on this date of service. This time spent by me includes preparing for the visit, reviewing tests, obtaining/reviewing separately obtained history, performing medically appropriate exam/evaluation, counseling/educating the patient/family/caregiver, ordering medications/tests/procedures, referring/communicating with other health care professionals, documenting information in the medical record, independently interpreting results and communicating that with the  patient/family/caregiver and/or care coordination.       Follow Up   Return in about 3 months (around 4/15/2024), or Follow-up with lab.  Patient was given instructions and counseling regarding her condition or for health maintenance advice. Please see specific information pulled into the AVS if appropriate.

## 2024-01-15 NOTE — PATIENT INSTRUCTIONS
1.  Patient had thyroid lobectomy and follow-up with thyroid function test shows all within normal limits.  At this time I do not think patient needs any supplementation.  2.  I am going to order another set of labs in 3 months and see if it still continues to maintain his level then I would not worry about it any further for supplementation.  3.  Patient was asked to continue to massage to scar tissue hopefully skin will relax from the trachea.  4.  Follow-up 3 months with lab

## 2024-01-23 NOTE — PAT
Reminded of arrival time at   0700, Entrance C of the [facility] hospital.   Instructed on clear liquid diet the day before, nothing red or purple. Call with any questions about the prep or if in need of the prep.  Reminded them not to eat or drink anything am of procedure unless its a sip of water with medications.

## 2024-01-30 ENCOUNTER — ANESTHESIA EVENT (OUTPATIENT)
Dept: GASTROENTEROLOGY | Facility: HOSPITAL | Age: 69
End: 2024-01-30
Payer: MEDICARE

## 2024-01-30 NOTE — ANESTHESIA PREPROCEDURE EVALUATION
Anesthesia Evaluation     Patient summary reviewed and Nursing notes reviewed   no history of anesthetic complications:   NPO Solid Status: > 8 hours  NPO Liquid Status: > 2 hours           Airway   Mallampati: II  TM distance: >3 FB  Neck ROM: full  No difficulty expected  Dental    (+) upper dentures and poor dentition    Pulmonary - negative pulmonary ROS and normal exam    breath sounds clear to auscultation  Cardiovascular - normal exam  Exercise tolerance: good (4-7 METS)    Rhythm: regular  Rate: normal    (+) hypertension well controlled, CAD, hyperlipidemia      Neuro/Psych  (+) seizures well controlled, headaches, syncope, numbness  GI/Hepatic/Renal/Endo    (+) GERD well controlled, renal disease-, diabetes mellitus type 2 well controlled, thyroid problem hypothyroidism    Musculoskeletal     (+) myalgias  Abdominal    Substance History - negative use     OB/GYN negative ob/gyn ROS         Other - negative ROS       ROS/Med Hx Other: Thyroidectomy 12/23    From prev eval:  >4METS, HX CAD,HTN,DM. ECHO 7/23 EF 67.2%,TRACE TR, CALCIFIC AV WITHOUT STENOSIS, STRESS 6/23 NEG FOR ISCHEMIA. CT 6/6/23 THYROID NODULE. FOLLOWS PCP, LAST OV 6/14/23 F/U PRN.       Phys Exam Other: Chronic cough - denies fever - denies smoking             Anesthesia Plan    ASA 3     general   total IV anesthesia  (Patient understands anesthesia not responsible for dental damage.)  intravenous induction     Anesthetic plan, risks, benefits, and alternatives have been provided, discussed and informed consent has been obtained with: patient and spouse/significant other.  Pre-procedure education provided  Plan discussed with CRNA.    CODE STATUS:

## 2024-01-31 ENCOUNTER — ANESTHESIA (OUTPATIENT)
Dept: GASTROENTEROLOGY | Facility: HOSPITAL | Age: 69
End: 2024-01-31
Payer: MEDICARE

## 2024-01-31 ENCOUNTER — HOSPITAL ENCOUNTER (OUTPATIENT)
Facility: HOSPITAL | Age: 69
Setting detail: HOSPITAL OUTPATIENT SURGERY
Discharge: HOME | End: 2024-01-31
Attending: INTERNAL MEDICINE | Admitting: INTERNAL MEDICINE
Payer: MEDICARE

## 2024-01-31 VITALS
HEART RATE: 76 BPM | OXYGEN SATURATION: 98 % | BODY MASS INDEX: 27.15 KG/M2 | DIASTOLIC BLOOD PRESSURE: 74 MMHG | WEIGHT: 163.14 LBS | RESPIRATION RATE: 20 BRPM | SYSTOLIC BLOOD PRESSURE: 138 MMHG | TEMPERATURE: 97.9 F

## 2024-01-31 DIAGNOSIS — Z86.010 HISTORY OF COLON POLYPS: ICD-10-CM

## 2024-01-31 DIAGNOSIS — Z12.11 ENCOUNTER FOR SCREENING FOR MALIGNANT NEOPLASM OF COLON: ICD-10-CM

## 2024-01-31 LAB — GLUCOSE BLDC GLUCOMTR-MCNC: 135 MG/DL (ref 70–99)

## 2024-01-31 PROCEDURE — 45385 COLONOSCOPY W/LESION REMOVAL: CPT | Performed by: INTERNAL MEDICINE

## 2024-01-31 PROCEDURE — 82948 REAGENT STRIP/BLOOD GLUCOSE: CPT | Performed by: ANESTHESIOLOGY

## 2024-01-31 PROCEDURE — 25810000003 LACTATED RINGERS PER 1000 ML: Performed by: ANESTHESIOLOGY

## 2024-01-31 PROCEDURE — 88305 TISSUE EXAM BY PATHOLOGIST: CPT | Performed by: INTERNAL MEDICINE

## 2024-01-31 PROCEDURE — 25010000002 PROPOFOL 10 MG/ML EMULSION

## 2024-01-31 RX ORDER — LIDOCAINE HYDROCHLORIDE 20 MG/ML
INJECTION, SOLUTION EPIDURAL; INFILTRATION; INTRACAUDAL; PERINEURAL AS NEEDED
Status: DISCONTINUED | OUTPATIENT
Start: 2024-01-31 | End: 2024-01-31 | Stop reason: SURG

## 2024-01-31 RX ORDER — PROPOFOL 10 MG/ML
VIAL (ML) INTRAVENOUS AS NEEDED
Status: DISCONTINUED | OUTPATIENT
Start: 2024-01-31 | End: 2024-01-31 | Stop reason: SURG

## 2024-01-31 RX ORDER — SODIUM CHLORIDE, SODIUM LACTATE, POTASSIUM CHLORIDE, CALCIUM CHLORIDE 600; 310; 30; 20 MG/100ML; MG/100ML; MG/100ML; MG/100ML
30 INJECTION, SOLUTION INTRAVENOUS CONTINUOUS
Status: DISCONTINUED | OUTPATIENT
Start: 2024-01-31 | End: 2024-01-31 | Stop reason: HOSPADM

## 2024-01-31 RX ORDER — LABETALOL HYDROCHLORIDE 5 MG/ML
INJECTION, SOLUTION INTRAVENOUS AS NEEDED
Status: DISCONTINUED | OUTPATIENT
Start: 2024-01-31 | End: 2024-01-31 | Stop reason: SURG

## 2024-01-31 RX ADMIN — PROPOFOL 40 MG: 10 INJECTION, EMULSION INTRAVENOUS at 09:11

## 2024-01-31 RX ADMIN — PROPOFOL 30 MG: 10 INJECTION, EMULSION INTRAVENOUS at 09:18

## 2024-01-31 RX ADMIN — SODIUM CHLORIDE, POTASSIUM CHLORIDE, SODIUM LACTATE AND CALCIUM CHLORIDE 30 ML/HR: 600; 310; 30; 20 INJECTION, SOLUTION INTRAVENOUS at 07:29

## 2024-01-31 RX ADMIN — PROPOFOL 75 MG: 10 INJECTION, EMULSION INTRAVENOUS at 09:02

## 2024-01-31 RX ADMIN — PROPOFOL 40 MG: 10 INJECTION, EMULSION INTRAVENOUS at 09:09

## 2024-01-31 RX ADMIN — LABETALOL HYDROCHLORIDE 5 MG: 5 INJECTION, SOLUTION INTRAVENOUS at 09:22

## 2024-01-31 RX ADMIN — LIDOCAINE HYDROCHLORIDE 30 MG: 20 INJECTION, SOLUTION EPIDURAL; INFILTRATION; INTRACAUDAL; PERINEURAL at 09:02

## 2024-01-31 RX ADMIN — PROPOFOL 200 MCG/KG/MIN: 10 INJECTION, EMULSION INTRAVENOUS at 09:02

## 2024-01-31 RX ADMIN — PROPOFOL 40 MG: 10 INJECTION, EMULSION INTRAVENOUS at 09:15

## 2024-01-31 NOTE — ANESTHESIA POSTPROCEDURE EVALUATION
Patient: Leilani Ordaz    Procedure Summary       Date: 01/31/24 Room / Location: [facility] ENDOSCOPY 2 / [facility]COPY    Anesthesia Start: 0859 Anesthesia Stop: 0930    Procedure: COLONOSCOPY WITH POLYPECTROMY Diagnosis:       Encounter for screening for malignant neoplasm of colon      History of colon polyps      (Encounter for screening for malignant neoplasm of colon [Z12.11])      (History of colon polyps [Z86.010])    Surgeons: Shola Kim MD Provider: Génesis Munoz CRNA    Anesthesia Type: general ASA Status: 3            Anesthesia Type: general    Vitals  Vitals Value Taken Time   BP 72/50 01/31/24 0944   Temp 36.6 °C (97.9 °F) 01/31/24 0943   Pulse 69 01/31/24 0944   Resp 20 01/31/24 0943   SpO2 99 % 01/31/24 0944   Vitals shown include unfiled device data.        Post Anesthesia Care and Evaluation    Post-procedure mental status: acceptable.  Pain score: 0  Pain management: satisfactory to patient    Airway patency: patent  Anesthetic complications: No anesthetic complications    Cardiovascular status: acceptable  Respiratory status: acceptable, spontaneous ventilation and room air    Comments: Per chart review

## 2024-01-31 NOTE — H&P
ScreeningPre Procedure History & Physical    Chief Complaint:   Screening     Subjective     HPI:   Screening     Past Medical History:   Past Medical History:   Diagnosis Date    Allergic     Arthritis     Broken bones     Cataract     Cholelithiasis     Colon polyp     Coronary artery disease     PER PCP NOTE. ECHO/STRESS DONE 2023    DDD (degenerative disc disease), lumbar     L4-L5    Diabetes     Diverticulosis     Esophageal reflux     Fibromyalgia, primary     Ganglion cyst 12/18/2018    Hemorrhoids     HLD (hyperlipidemia)     HTN (hypertension) 12/18/2018    Hyperlipemia     Limb swelling     Low back pain     Migraine     Neuropathy in diabetes 07/01/2019    OR SOONER    Night sweats     Seasonal allergies     Sinus congestion     Sinus trouble     Skin disease     Syncope     Type 2 diabetes mellitus with stage 3 chronic kidney disease, without long-term current use of insulin 12/18/2018       Past Surgical History:  Past Surgical History:   Procedure Laterality Date    COLONOSCOPY  2018    FOLLOW UP IN 5 YEARS (2023)    CYST REMOVAL      FROM THROAT    EYE SURGERY      CATARACT SURGERY    HAND SURGERY      CALCIUM REMOVED FROM THUMB    THYROIDECTOMY Bilateral 12/5/2023    Procedure: LEFT THYROIDECTOMY with isthmusectomy and FROZEN SECTION, , RECURRENT LARYNGEAL NERVE MONITORING;  Surgeon: Drew Nicole MD;  Location: [facility]  Service: ENT;  Laterality: Bilateral;       Family History:  Family History   Problem Relation Age of Onset    Heart disease Mother     Diabetes Mother     Arthritis Mother     Heart disease Father     Diabetes Father     Arthritis Father     Rashes / Skin problems Father     Arthritis Sister     Diabetes Daughter     Diabetes Son     Heart disease Other     Diabetes Other     Heart disease Other     Diabetes Other     Heart disease Other     Diabetes Other     Colon cancer Neg Hx        Social History:   reports that she has never smoked. She has never used smokeless  tobacco. She reports that she does not currently use alcohol. She reports that she does not currently use drugs.    Medications:   Medications Prior to Admission   Medication Sig Dispense Refill Last Dose    Aspirin Low Dose 81 MG EC tablet Take 1 tablet by mouth Daily. 90 tablet 3 1/30/2024    atorvastatin (LIPITOR) 80 MG tablet TAKE ONE TABLET BY MOUTH DAILY FOR CHOLESTEROL (Patient taking differently: Take 1 tablet by mouth Every Night.) 90 tablet 3     calcium carbonate (OS-DIANE) 600 MG tablet Take 1 tablet by mouth Daily.       Cholecalciferol 25 MCG (1000 UT) tablet Take 1 tablet by mouth Daily.       dapagliflozin Propanediol (Farxiga) 10 MG tablet Take 10 mg by mouth Daily. 90 tablet 3     Diclofenac Sodium (VOLTAREN) 1 % gel gel Apply 4 g topically to the appropriate area as directed 4 (Four) Times a Day As Needed (joint pain). 100 g 1     fluticasone (FLONASE) 50 MCG/ACT nasal spray 2 sprays into the nostril(s) as directed by provider Daily. 16 g 1     HYDROcodone-acetaminophen (NORCO) 7.5-325 MG per tablet Take 1 tablet by mouth Every 4 (Four) Hours As Needed for Moderate Pain (Pain). 20 tablet 0     lisinopril (PRINIVIL,ZESTRIL) 20 MG tablet Take 1 tablet by mouth Daily 90 tablet 3     loratadine (Claritin) 10 MG tablet Take 1 tablet by mouth Daily. 90 tablet 3     meloxicam (MOBIC) 15 MG tablet Take 1 tablet by mouth Daily.       metFORMIN (GLUCOPHAGE) 1000 MG tablet TAKE ONE TABLET BY MOUTH TWICE DAILY 180 tablet 3     montelukast (Singulair) 10 MG tablet Take 1 tablet by mouth Every Night. 90 tablet 3     Omega-3 Fatty Acids (fish oil) 1000 MG capsule capsule Take 1 capsule by mouth 2 (Two) Times a Day With Meals.          Allergies:  Patient has no known allergies.        Objective     Blood pressure 133/85, pulse 82, temperature 97.9 °F (36.6 °C), temperature source Temporal, resp. rate 16, weight 74 kg (163 lb 2.3 oz), SpO2 96%.    Physical Exam   Constitutional: Pt is oriented to person, place, and  time and well-developed, well-nourished, and in no distress.   Mouth/Throat: Oropharynx is clear and moist.   Neck: Normal range of motion.   Cardiovascular: Normal rate, regular rhythm and normal heart sounds.    Pulmonary/Chest: Effort normal and breath sounds normal.   Abdominal: Soft. Nontender  Skin: Skin is warm and dry.   Psychiatric: Mood, memory, affect and judgment normal.     Assessment & Plan     Diagnosis:  Screening colonoscopy  H/o colon polyps     Anticipated Surgical Procedure:  colonoscopy    The risks, benefits, and alternatives of this procedure have been discussed with the patient or the responsible party- the patient understands and agrees to proceed.

## 2024-02-06 RX ORDER — ATORVASTATIN CALCIUM 80 MG/1
80 TABLET, FILM COATED ORAL DAILY
Qty: 90 TABLET | Refills: 3 | Status: SHIPPED | OUTPATIENT
Start: 2024-02-06

## 2024-02-26 RX ORDER — LISINOPRIL 20 MG/1
20 TABLET ORAL DAILY
Qty: 90 TABLET | Refills: 3 | Status: SHIPPED | OUTPATIENT
Start: 2024-02-26

## 2024-03-12 ENCOUNTER — LAB (OUTPATIENT)
Dept: LAB | Facility: HOSPITAL | Age: 69
End: 2024-03-12
Payer: MEDICARE

## 2024-03-12 DIAGNOSIS — E03.9 HYPOTHYROIDISM (ACQUIRED): ICD-10-CM

## 2024-03-12 DIAGNOSIS — E55.9 VITAMIN D DEFICIENCY: ICD-10-CM

## 2024-03-12 DIAGNOSIS — I10 ESSENTIAL HYPERTENSION: ICD-10-CM

## 2024-03-12 DIAGNOSIS — E89.0 POSTOPERATIVE HYPOTHYROIDISM: ICD-10-CM

## 2024-03-12 DIAGNOSIS — E78.2 MIXED HYPERLIPIDEMIA: ICD-10-CM

## 2024-03-12 DIAGNOSIS — E11.65 TYPE 2 DIABETES MELLITUS WITH HYPERGLYCEMIA, WITHOUT LONG-TERM CURRENT USE OF INSULIN: ICD-10-CM

## 2024-03-12 LAB
25(OH)D3 SERPL-MCNC: 50.6 NG/ML (ref 30–100)
ALBUMIN SERPL-MCNC: 4.6 G/DL (ref 3.5–5.2)
ALBUMIN/GLOB SERPL: 1.8 G/DL
ALP SERPL-CCNC: 78 U/L (ref 39–117)
ALT SERPL W P-5'-P-CCNC: 15 U/L (ref 1–33)
ANION GAP SERPL CALCULATED.3IONS-SCNC: 13.5 MMOL/L (ref 5–15)
AST SERPL-CCNC: 20 U/L (ref 1–32)
BASOPHILS # BLD AUTO: 0.07 10*3/MM3 (ref 0–0.2)
BASOPHILS NFR BLD AUTO: 0.7 % (ref 0–1.5)
BILIRUB SERPL-MCNC: 0.6 MG/DL (ref 0–1.2)
BUN SERPL-MCNC: 14 MG/DL (ref 8–23)
BUN/CREAT SERPL: 12.6 (ref 7–25)
CALCIUM SPEC-SCNC: 9.6 MG/DL (ref 8.6–10.5)
CHLORIDE SERPL-SCNC: 104 MMOL/L (ref 98–107)
CHOLEST SERPL-MCNC: 123 MG/DL (ref 0–200)
CO2 SERPL-SCNC: 24.5 MMOL/L (ref 22–29)
CREAT SERPL-MCNC: 1.11 MG/DL (ref 0.57–1)
DEPRECATED RDW RBC AUTO: 42 FL (ref 37–54)
EGFRCR SERPLBLD CKD-EPI 2021: 54.3 ML/MIN/1.73
EOSINOPHIL # BLD AUTO: 0.02 10*3/MM3 (ref 0–0.4)
EOSINOPHIL NFR BLD AUTO: 0.2 % (ref 0.3–6.2)
ERYTHROCYTE [DISTWIDTH] IN BLOOD BY AUTOMATED COUNT: 13.1 % (ref 12.3–15.4)
GLOBULIN UR ELPH-MCNC: 2.5 GM/DL
GLUCOSE SERPL-MCNC: 114 MG/DL (ref 65–99)
HBA1C MFR BLD: 6.5 % (ref 4.8–5.6)
HCT VFR BLD AUTO: 41.2 % (ref 34–46.6)
HDLC SERPL-MCNC: 40 MG/DL (ref 40–60)
HGB BLD-MCNC: 13.6 G/DL (ref 12–15.9)
IMM GRANULOCYTES # BLD AUTO: 0.03 10*3/MM3 (ref 0–0.05)
IMM GRANULOCYTES NFR BLD AUTO: 0.3 % (ref 0–0.5)
LDLC SERPL CALC-MCNC: 65 MG/DL (ref 0–100)
LDLC/HDLC SERPL: 1.62 {RATIO}
LYMPHOCYTES # BLD AUTO: 3.33 10*3/MM3 (ref 0.7–3.1)
LYMPHOCYTES NFR BLD AUTO: 33.6 % (ref 19.6–45.3)
MCH RBC QN AUTO: 28.8 PG (ref 26.6–33)
MCHC RBC AUTO-ENTMCNC: 33 G/DL (ref 31.5–35.7)
MCV RBC AUTO: 87.3 FL (ref 79–97)
MONOCYTES # BLD AUTO: 0.69 10*3/MM3 (ref 0.1–0.9)
MONOCYTES NFR BLD AUTO: 7 % (ref 5–12)
NEUTROPHILS NFR BLD AUTO: 5.78 10*3/MM3 (ref 1.7–7)
NEUTROPHILS NFR BLD AUTO: 58.2 % (ref 42.7–76)
NRBC BLD AUTO-RTO: 0 /100 WBC (ref 0–0.2)
PLATELET # BLD AUTO: 282 10*3/MM3 (ref 140–450)
PMV BLD AUTO: 11.3 FL (ref 6–12)
POTASSIUM SERPL-SCNC: 4.1 MMOL/L (ref 3.5–5.2)
PROT SERPL-MCNC: 7.1 G/DL (ref 6–8.5)
RBC # BLD AUTO: 4.72 10*6/MM3 (ref 3.77–5.28)
SODIUM SERPL-SCNC: 142 MMOL/L (ref 136–145)
T3FREE SERPL-MCNC: 3.05 PG/ML (ref 2–4.4)
T4 FREE SERPL-MCNC: 1.3 NG/DL (ref 0.93–1.7)
TRIGL SERPL-MCNC: 92 MG/DL (ref 0–150)
TSH SERPL DL<=0.05 MIU/L-ACNC: 6.91 UIU/ML (ref 0.27–4.2)
VLDLC SERPL-MCNC: 18 MG/DL (ref 5–40)
WBC NRBC COR # BLD AUTO: 9.92 10*3/MM3 (ref 3.4–10.8)

## 2024-03-12 PROCEDURE — 85025 COMPLETE CBC W/AUTO DIFF WBC: CPT

## 2024-03-12 PROCEDURE — 36415 COLL VENOUS BLD VENIPUNCTURE: CPT

## 2024-03-12 PROCEDURE — 82306 VITAMIN D 25 HYDROXY: CPT

## 2024-03-12 PROCEDURE — 80061 LIPID PANEL: CPT

## 2024-03-12 PROCEDURE — 84481 FREE ASSAY (FT-3): CPT

## 2024-03-12 PROCEDURE — 83036 HEMOGLOBIN GLYCOSYLATED A1C: CPT

## 2024-03-12 PROCEDURE — 84443 ASSAY THYROID STIM HORMONE: CPT

## 2024-03-12 PROCEDURE — 80053 COMPREHEN METABOLIC PANEL: CPT

## 2024-03-12 PROCEDURE — 84439 ASSAY OF FREE THYROXINE: CPT

## 2024-03-15 DIAGNOSIS — E03.9 HYPOTHYROIDISM (ACQUIRED): Primary | ICD-10-CM

## 2024-03-15 RX ORDER — LEVOTHYROXINE SODIUM 50 MCG
50 TABLET ORAL DAILY
Qty: 30 TABLET | Refills: 3 | Status: SHIPPED | OUTPATIENT
Start: 2024-03-15

## 2024-03-18 ENCOUNTER — OFFICE VISIT (OUTPATIENT)
Dept: FAMILY MEDICINE CLINIC | Facility: CLINIC | Age: 69
End: 2024-03-18
Payer: MEDICARE

## 2024-03-18 VITALS
OXYGEN SATURATION: 99 % | SYSTOLIC BLOOD PRESSURE: 140 MMHG | TEMPERATURE: 98 F | HEIGHT: 65 IN | BODY MASS INDEX: 28.12 KG/M2 | DIASTOLIC BLOOD PRESSURE: 84 MMHG | WEIGHT: 168.8 LBS | RESPIRATION RATE: 19 BRPM | HEART RATE: 72 BPM

## 2024-03-18 DIAGNOSIS — E55.9 VITAMIN D DEFICIENCY: ICD-10-CM

## 2024-03-18 DIAGNOSIS — N18.31 STAGE 3A CHRONIC KIDNEY DISEASE: ICD-10-CM

## 2024-03-18 DIAGNOSIS — Z51.81 MEDICATION MONITORING ENCOUNTER: ICD-10-CM

## 2024-03-18 DIAGNOSIS — Z12.11 COLON CANCER SCREENING: ICD-10-CM

## 2024-03-18 DIAGNOSIS — E89.0 POSTOPERATIVE HYPOTHYROIDISM: ICD-10-CM

## 2024-03-18 DIAGNOSIS — E78.2 MIXED HYPERLIPIDEMIA: ICD-10-CM

## 2024-03-18 DIAGNOSIS — E04.1 COLLOID THYROID NODULE: ICD-10-CM

## 2024-03-18 DIAGNOSIS — E11.65 TYPE 2 DIABETES MELLITUS WITH HYPERGLYCEMIA, WITHOUT LONG-TERM CURRENT USE OF INSULIN: Primary | ICD-10-CM

## 2024-03-18 DIAGNOSIS — I10 ESSENTIAL HYPERTENSION: ICD-10-CM

## 2024-03-18 DIAGNOSIS — J30.9 ALLERGIC RHINITIS, UNSPECIFIED SEASONALITY, UNSPECIFIED TRIGGER: ICD-10-CM

## 2024-03-18 NOTE — PROGRESS NOTES
Chief Complaint  Diabetes      Subjective          Leilani Ordaz presents to [facility]  History of Present Illness  Patient presents today to follow-up for diabetes as well as postoperative hypothyroidism.  She did have labs done prior to the appointment so we reviewed these.  CBC showed no anemia with normal white blood cell count and platelets.  CMP did show improvement in her creatinine as it was at 1.11.  Her GFR was 54.3.  She does have stable chronic kidney disease stage IIIa.  We had previously discussed adding Farxiga but this was prohibitively expensive.  I will place her on Jardiance.  As far as her thyroid is concerned that she had a colloid nodule which is benign.  Parathyroid hormone and calcium levels were normal.  Patient did have a TSH completed that showed a elevation up to 6.910.  An order was placed by Dr. Nicole to take 50 mcg of levothyroxine daily.  I did discuss with her having her labs repeated again prior to his follow-up appointment with her on 4/22/2024.  Orders have previously been placed.  We had previously discussed her allergies.  Singulair was added to her regimen where she is already taking Claritin and Flonase.  This has been beneficial.  She did have a colonoscopy since I last saw her.  She had 1 polyp which was removed.  The pathology report read that this was fecal material only with no tissue being present.  She was recommended to have a 5-year follow-up.  Blood pressure has been adequately controlled taking lisinopril 20 mg daily.      Current Outpatient Medications   Medication Instructions    Aspirin Low Dose 81 mg, Oral, Daily    atorvastatin (LIPITOR) 80 mg, Oral, Daily    calcium carbonate (OS-IDANE) 600 mg, Oral, Daily    cholecalciferol 1,000 Units, Oral, Daily    Diclofenac Sodium (VOLTAREN) 4 g, Topical, 4 Times Daily PRN    empagliflozin (JARDIANCE) 10 mg, Oral, Daily    fish oil 1,000 mg, Oral, 2 Times Daily With Meals    fluticasone  "(FLONASE) 50 MCG/ACT nasal spray 2 sprays, Nasal, Daily    HYDROcodone-acetaminophen (NORCO) 7.5-325 MG per tablet 1 tablet, Oral, Every 4 Hours PRN    lisinopril (PRINIVIL,ZESTRIL) 20 mg, Oral, Daily    loratadine (CLARITIN) 10 mg, Oral, Daily    meloxicam (MOBIC) 15 mg, Oral, Daily    metFORMIN (GLUCOPHAGE) 1000 MG tablet TAKE ONE TABLET BY MOUTH TWICE DAILY    montelukast (SINGULAIR) 10 mg, Oral, Nightly    Synthroid 50 mcg, Oral, Daily       The following portions of the patient's history were reviewed and updated as appropriate: allergies, current medications, past family history, past medical history, past social history, past surgical history, and problem list.    Objective   Vital Signs:   /84 (BP Location: Left arm, Patient Position: Sitting, Cuff Size: Adult)   Pulse 72   Temp 98 °F (36.7 °C) (Oral)   Resp 19   Ht 165.1 cm (65\")   Wt 76.6 kg (168 lb 12.8 oz)   SpO2 99%   BMI 28.09 kg/m²     BP Readings from Last 3 Encounters:   03/18/24 140/84   01/31/24 138/74   12/18/23 140/78     Wt Readings from Last 3 Encounters:   03/18/24 76.6 kg (168 lb 12.8 oz)   01/31/24 74 kg (163 lb 2.3 oz)   01/15/24 77 kg (169 lb 12.8 oz)           Physical Exam  Vitals reviewed.   Constitutional:       Appearance: Normal appearance.   HENT:      Head: Normocephalic and atraumatic.      Right Ear: External ear normal.      Left Ear: External ear normal.      Nose: Nose normal.   Eyes:      Conjunctiva/sclera: Conjunctivae normal.   Cardiovascular:      Rate and Rhythm: Normal rate and regular rhythm.      Heart sounds: No murmur heard.     No friction rub. No gallop.   Pulmonary:      Effort: Pulmonary effort is normal.      Breath sounds: Normal breath sounds. No wheezing or rhonchi.   Abdominal:      General: Bowel sounds are normal. There is no distension.      Palpations: Abdomen is soft.      Tenderness: There is no abdominal tenderness.   Skin:     General: Skin is warm and dry.   Neurological:      Mental " Status: She is alert and oriented to person, place, and time.      Cranial Nerves: No cranial nerve deficit.   Psychiatric:         Mood and Affect: Mood and affect normal.         Behavior: Behavior normal.         Thought Content: Thought content normal.         Judgment: Judgment normal.          [unfilled]    Result Review :   The following data was reviewed by: Js Tavares DO on 03/18/2024:  Common labs          12/5/2023    06:36 12/6/2023    04:41 3/12/2024    08:40   Common Labs   Glucose 139   114    BUN 16   14    Creatinine 1.35   1.11    Sodium 138   142    Potassium 4.1   4.1    Chloride 100   104    Calcium 10.1  9.0  9.6    Albumin   4.6    Total Bilirubin   0.6    Alkaline Phosphatase   78    AST (SGOT)   20    ALT (SGPT)   15    WBC   9.92    Hemoglobin   13.6    Hematocrit   41.2    Platelets   282    Total Cholesterol   123    Triglycerides   92    HDL Cholesterol   40    LDL Cholesterol    65    Hemoglobin A1C   6.50             Lab Results   Component Value Date    BILIRUBINUR Negative 04/20/2023       Procedures        Assessment and Plan    Diagnoses and all orders for this visit:    1. Type 2 diabetes mellitus with hyperglycemia, without long-term current use of insulin (Primary)  -     CBC & Differential; Future  -     Comprehensive Metabolic Panel; Future  -     Hemoglobin A1c; Future    2. Medication monitoring encounter  -     CBC & Differential; Future  -     Comprehensive Metabolic Panel; Future  -     Hemoglobin A1c; Future  -     Lipid Panel; Future  -     Vitamin D,25-Hydroxy; Future    3. Colon cancer screening  Overview:  7/2018- Dr. Kim. Repeat in 5 years, 2023 1/31/2024- Dr. Kim. Repeat in 5 years (2029)      4. Colloid thyroid nodule    5. Postoperative hypothyroidism    6. Stage 3a chronic kidney disease    7. Allergic rhinitis, unspecified seasonality, unspecified trigger    8. Mixed hyperlipidemia  -     Lipid Panel; Future    9. Essential hypertension    10.  Vitamin D deficiency  -     Vitamin D,25-Hydroxy; Future    Other orders  -     empagliflozin (Jardiance) 10 MG tablet tablet; Take 1 tablet by mouth Daily.  Dispense: 30 tablet; Refill: 2    Plan as documented above.  I discussed with patient starting Jardiance for renal protection given chronic kidney disease.  I did discuss with patient continuing current management for hypertension.  As far as diabetes is concerned she will continue with metformin at 1000 mg twice a day with the benefit of adding Jardiance 10 mg daily as well.  Her most recent A1c was at 6.5%.  Lipid panel showed her LDL at 65.  She does continue to take atorvastatin 80 mg daily.  I will see her back in 3 months or sooner if needed.      Medications Discontinued During This Encounter   Medication Reason    dapagliflozin Propanediol (Farxiga) 10 MG tablet           Follow Up   Return in about 3 months (around 6/18/2024) for diabetes.  Patient was given instructions and counseling regarding her condition or for health maintenance advice. Please see specific information pulled into the AVS if appropriate.       Js Tavares DO  03/18/24  10:51 EDT

## 2024-03-22 RX ORDER — ALBUTEROL SULFATE 90 UG/1
2 AEROSOL, METERED RESPIRATORY (INHALATION) EVERY 4 HOURS PRN
Qty: 8.5 G | Refills: 1 | Status: SHIPPED | OUTPATIENT
Start: 2024-03-22

## 2024-04-08 ENCOUNTER — TELEPHONE (OUTPATIENT)
Dept: OTOLARYNGOLOGY | Facility: CLINIC | Age: 69
End: 2024-04-08
Payer: COMMERCIAL

## 2024-04-08 ENCOUNTER — TELEPHONE (OUTPATIENT)
Dept: OTOLARYNGOLOGY | Facility: CLINIC | Age: 69
End: 2024-04-08

## 2024-04-17 ENCOUNTER — LAB (OUTPATIENT)
Dept: LAB | Facility: HOSPITAL | Age: 69
End: 2024-04-17
Payer: MEDICARE

## 2024-04-17 DIAGNOSIS — E03.9 HYPOTHYROIDISM (ACQUIRED): ICD-10-CM

## 2024-04-17 DIAGNOSIS — Z51.81 MEDICATION MONITORING ENCOUNTER: ICD-10-CM

## 2024-04-17 DIAGNOSIS — E78.2 MIXED HYPERLIPIDEMIA: ICD-10-CM

## 2024-04-17 DIAGNOSIS — E55.9 VITAMIN D DEFICIENCY: ICD-10-CM

## 2024-04-17 DIAGNOSIS — E11.65 TYPE 2 DIABETES MELLITUS WITH HYPERGLYCEMIA, WITHOUT LONG-TERM CURRENT USE OF INSULIN: ICD-10-CM

## 2024-04-17 LAB
25(OH)D3 SERPL-MCNC: 54.9 NG/ML (ref 30–100)
ALBUMIN SERPL-MCNC: 4.2 G/DL (ref 3.5–5.2)
ALBUMIN/GLOB SERPL: 1.8 G/DL
ALP SERPL-CCNC: 67 U/L (ref 39–117)
ALT SERPL W P-5'-P-CCNC: 13 U/L (ref 1–33)
ANION GAP SERPL CALCULATED.3IONS-SCNC: 11.2 MMOL/L (ref 5–15)
AST SERPL-CCNC: 17 U/L (ref 1–32)
BASOPHILS # BLD AUTO: 0.07 10*3/MM3 (ref 0–0.2)
BASOPHILS NFR BLD AUTO: 0.6 % (ref 0–1.5)
BILIRUB SERPL-MCNC: 0.3 MG/DL (ref 0–1.2)
BUN SERPL-MCNC: 15 MG/DL (ref 8–23)
BUN/CREAT SERPL: 12.3 (ref 7–25)
CALCIUM SPEC-SCNC: 10.3 MG/DL (ref 8.6–10.5)
CHLORIDE SERPL-SCNC: 103 MMOL/L (ref 98–107)
CHOLEST SERPL-MCNC: 111 MG/DL (ref 0–200)
CO2 SERPL-SCNC: 23.8 MMOL/L (ref 22–29)
CREAT SERPL-MCNC: 1.22 MG/DL (ref 0.57–1)
DEPRECATED RDW RBC AUTO: 40.5 FL (ref 37–54)
EGFRCR SERPLBLD CKD-EPI 2021: 48.4 ML/MIN/1.73
EOSINOPHIL # BLD AUTO: 0.06 10*3/MM3 (ref 0–0.4)
EOSINOPHIL NFR BLD AUTO: 0.5 % (ref 0.3–6.2)
ERYTHROCYTE [DISTWIDTH] IN BLOOD BY AUTOMATED COUNT: 12.6 % (ref 12.3–15.4)
GLOBULIN UR ELPH-MCNC: 2.3 GM/DL
GLUCOSE SERPL-MCNC: 128 MG/DL (ref 65–99)
HBA1C MFR BLD: 6.6 % (ref 4.8–5.6)
HCT VFR BLD AUTO: 37.1 % (ref 34–46.6)
HDLC SERPL-MCNC: 35 MG/DL (ref 40–60)
HGB BLD-MCNC: 12.2 G/DL (ref 12–15.9)
IMM GRANULOCYTES # BLD AUTO: 0.03 10*3/MM3 (ref 0–0.05)
IMM GRANULOCYTES NFR BLD AUTO: 0.3 % (ref 0–0.5)
LDLC SERPL CALC-MCNC: 52 MG/DL (ref 0–100)
LDLC/HDLC SERPL: 1.38 {RATIO}
LYMPHOCYTES # BLD AUTO: 2.9 10*3/MM3 (ref 0.7–3.1)
LYMPHOCYTES NFR BLD AUTO: 24.3 % (ref 19.6–45.3)
MCH RBC QN AUTO: 28.8 PG (ref 26.6–33)
MCHC RBC AUTO-ENTMCNC: 32.9 G/DL (ref 31.5–35.7)
MCV RBC AUTO: 87.7 FL (ref 79–97)
MONOCYTES # BLD AUTO: 0.64 10*3/MM3 (ref 0.1–0.9)
MONOCYTES NFR BLD AUTO: 5.4 % (ref 5–12)
NEUTROPHILS NFR BLD AUTO: 68.9 % (ref 42.7–76)
NEUTROPHILS NFR BLD AUTO: 8.21 10*3/MM3 (ref 1.7–7)
NRBC BLD AUTO-RTO: 0 /100 WBC (ref 0–0.2)
PLATELET # BLD AUTO: 278 10*3/MM3 (ref 140–450)
PMV BLD AUTO: 11 FL (ref 6–12)
POTASSIUM SERPL-SCNC: 4.4 MMOL/L (ref 3.5–5.2)
PROT SERPL-MCNC: 6.5 G/DL (ref 6–8.5)
RBC # BLD AUTO: 4.23 10*6/MM3 (ref 3.77–5.28)
SODIUM SERPL-SCNC: 138 MMOL/L (ref 136–145)
T3FREE SERPL-MCNC: 2.19 PG/ML (ref 2–4.4)
T4 FREE SERPL-MCNC: 1.51 NG/DL (ref 0.93–1.7)
TRIGL SERPL-MCNC: 138 MG/DL (ref 0–150)
TSH SERPL DL<=0.05 MIU/L-ACNC: 3.08 UIU/ML (ref 0.27–4.2)
VLDLC SERPL-MCNC: 24 MG/DL (ref 5–40)
WBC NRBC COR # BLD AUTO: 11.91 10*3/MM3 (ref 3.4–10.8)

## 2024-04-17 PROCEDURE — 85025 COMPLETE CBC W/AUTO DIFF WBC: CPT

## 2024-04-17 PROCEDURE — 80053 COMPREHEN METABOLIC PANEL: CPT

## 2024-04-17 PROCEDURE — 83036 HEMOGLOBIN GLYCOSYLATED A1C: CPT

## 2024-04-17 PROCEDURE — 80061 LIPID PANEL: CPT

## 2024-04-17 PROCEDURE — 84443 ASSAY THYROID STIM HORMONE: CPT

## 2024-04-17 PROCEDURE — 82306 VITAMIN D 25 HYDROXY: CPT

## 2024-04-17 PROCEDURE — 84481 FREE ASSAY (FT-3): CPT

## 2024-04-17 PROCEDURE — 84439 ASSAY OF FREE THYROXINE: CPT

## 2024-04-17 PROCEDURE — 36415 COLL VENOUS BLD VENIPUNCTURE: CPT

## 2024-04-18 ENCOUNTER — TELEPHONE (OUTPATIENT)
Dept: FAMILY MEDICINE CLINIC | Facility: CLINIC | Age: 69
End: 2024-04-18
Payer: COMMERCIAL

## 2024-04-22 ENCOUNTER — OFFICE VISIT (OUTPATIENT)
Dept: OTOLARYNGOLOGY | Facility: CLINIC | Age: 69
End: 2024-04-22
Payer: COMMERCIAL

## 2024-04-22 VITALS
HEART RATE: 73 BPM | SYSTOLIC BLOOD PRESSURE: 146 MMHG | DIASTOLIC BLOOD PRESSURE: 72 MMHG | HEIGHT: 65 IN | BODY MASS INDEX: 28.09 KG/M2 | TEMPERATURE: 97.3 F

## 2024-04-22 DIAGNOSIS — E03.9 HYPOTHYROIDISM (ACQUIRED): Primary | ICD-10-CM

## 2024-04-22 DIAGNOSIS — Z90.09 HISTORY OF LOBECTOMY OF THYROID: ICD-10-CM

## 2024-04-22 PROCEDURE — 99213 OFFICE O/P EST LOW 20 MIN: CPT | Performed by: OTOLARYNGOLOGY

## 2024-04-22 RX ORDER — LEVOTHYROXINE SODIUM 0.05 MG/1
50 TABLET ORAL DAILY
Qty: 90 TABLET | Refills: 3 | Status: SHIPPED | OUTPATIENT
Start: 2024-04-22

## 2024-04-22 NOTE — PROGRESS NOTES
Patient Name: Leilani Ordaz   Visit Date: 04/22/2024   Patient ID: [id number]  Provider: Drew Nicole MD    Sex: female  Location: [facility]   YOB: 1955  Location Address: [address] [place] [address], [address] [place] [address],?[place]?[id number]    Primary Care Provider Js Tavares DO  Location Phone: [phone]    Referring Provider: No ref. provider found        Chief Complaint  FOLLOW UP LAB RESULTS    History of Present Illness  Leilani Ordaz is a 68 y.o. female who presents to [facility] for FOLLOW UP LAB RESULTS. 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  Frozen Section intraoperatively revealed colloid nodule.  Final pathology today reveals that it is indeed benign colloid nodule.  There was no evidence of parathyroid gland present. Patient had thyroid lobectomy and follow-up with thyroid function test shows all within normal limits.  Patient had been on 50 mcg of Synthroid p.o. daily    Patient was asked to continue to massage to scar tissue hopefully skin will relax from the trachea.  Patient is here for follow-up 3 months with lab    Past Medical History:   Diagnosis Date    Allergic     Arthritis     Broken bones     Cataract     Cholelithiasis     Colon polyp     Coronary artery disease     PER PCP NOTE. ECHO/STRESS DONE 2023    DDD (degenerative disc disease), lumbar     L4-L5    Diabetes     Diverticulosis     Esophageal reflux     Fibromyalgia, primary     Ganglion cyst 12/18/2018    Hemorrhoids     HLD (hyperlipidemia)     HTN (hypertension) 12/18/2018    Hyperlipemia     Limb swelling     Low back pain     Migraine     Neuropathy in diabetes 07/01/2019    OR SOONER    Night sweats     Seasonal allergies     Sinus congestion     Sinus trouble     Skin disease     Syncope     Type 2 diabetes mellitus with stage 3 chronic kidney  disease, without long-term current use of insulin 12/18/2018       Past Surgical History:   Procedure Laterality Date    COLONOSCOPY  2018    FOLLOW UP IN 5 YEARS (2023)    COLONOSCOPY N/A 1/31/2024    Procedure: COLONOSCOPY WITH POLYPECTROMY;  Surgeon: Shola Kim MD;  Location: [facility];  Service: Gastroenterology;  Laterality: N/A;  DIVERTICULOSIS, COLON POLYP    CYST REMOVAL      FROM THROAT    EYE SURGERY      CATARACT SURGERY    HAND SURGERY      CALCIUM REMOVED FROM THUMB    THYROIDECTOMY Bilateral 12/5/2023    Procedure: LEFT THYROIDECTOMY with isthmusectomy and FROZEN SECTION, , RECURRENT LARYNGEAL NERVE MONITORING;  Surgeon: Drew iNcole MD;  Location: [facility] MAIN OR;  Service: ENT;  Laterality: Bilateral;         Current Outpatient Medications:     albuterol sulfate  (90 Base) MCG/ACT inhaler, INHALE 2 PUFFS BY MOUTH EVERY 4 HOURS AS NEEDED FOR WHEEZING, Disp: 8.5 g, Rfl: 1    Aspirin Low Dose 81 MG EC tablet, Take 1 tablet by mouth Daily., Disp: 90 tablet, Rfl: 3    atorvastatin (LIPITOR) 80 MG tablet, TAKE 1 TABLET BY MOUTH DAILY, Disp: 90 tablet, Rfl: 3    calcium carbonate (OS-DIANE) 600 MG tablet, Take 1 tablet by mouth Daily., Disp: , Rfl:     Cholecalciferol 25 MCG (1000 UT) tablet, Take 1 tablet by mouth Daily., Disp: , Rfl:     Diclofenac Sodium (VOLTAREN) 1 % gel gel, Apply 4 g topically to the appropriate area as directed 4 (Four) Times a Day As Needed (joint pain)., Disp: 100 g, Rfl: 1    empagliflozin (Jardiance) 10 MG tablet tablet, Take 1 tablet by mouth Daily., Disp: 30 tablet, Rfl: 2    fluticasone (FLONASE) 50 MCG/ACT nasal spray, 2 sprays into the nostril(s) as directed by provider Daily., Disp: 16 g, Rfl: 1    HYDROcodone-acetaminophen (NORCO) 7.5-325 MG per tablet, Take 1 tablet by mouth Every 4 (Four) Hours As Needed for Moderate Pain (Pain)., Disp: 20 tablet, Rfl: 0    levothyroxine (Synthroid) 50 MCG tablet, Take 1 tablet by mouth Daily., Disp: 90 tablet,  "Rfl: 3    lisinopril (PRINIVIL,ZESTRIL) 20 MG tablet, Take 1 tablet by mouth Daily., Disp: 90 tablet, Rfl: 3    loratadine (Claritin) 10 MG tablet, Take 1 tablet by mouth Daily., Disp: 90 tablet, Rfl: 3    meloxicam (MOBIC) 15 MG tablet, Take 1 tablet by mouth Daily., Disp: , Rfl:     metFORMIN (GLUCOPHAGE) 1000 MG tablet, TAKE 1 TABLET BY MOUTH TWICE A DAY, Disp: 180 tablet, Rfl: 3    montelukast (Singulair) 10 MG tablet, Take 1 tablet by mouth Every Night., Disp: 90 tablet, Rfl: 3    Omega-3 Fatty Acids (fish oil) 1000 MG capsule capsule, Take 1 capsule by mouth 2 (Two) Times a Day With Meals., Disp: , Rfl:      No Known Allergies    Social History     Tobacco Use    Smoking status: Never     Passive exposure: Past    Smokeless tobacco: Never   Vaping Use    Vaping status: Never Used   Substance Use Topics    Alcohol use: Not Currently     Comment: LIQUOR BUT VARY RARELY    Drug use: Not Currently        Objective     Vital Signs:   Vitals:    04/22/24 1320   BP: 146/72   BP Location: Left arm   Patient Position: Sitting   Cuff Size: Adult   Pulse: 73   Temp: 97.3 °F (36.3 °C)   TempSrc: Temporal   Height: 165.1 cm (65\")       Tobacco Use: Low Risk  (4/22/2024)    Patient History     Smoking Tobacco Use: Never     Smokeless Tobacco Use: Never     Passive Exposure: Past         Physical Exam    Constitutional   Appearance  well developed, well-nourished, alert and in no acute distress, voice clear and strong    Head   Inspection  no deformities or lesions      Face   Inspection  no facial lesions; House-Brackmann I/VI bilaterally   Palpation  no TMJ crepitus nor  muscle tenderness bilaterally     Eyes/Vision   Visual Fields  extraocular movements are intact, no spontaneous or gaze-induced nystagmus  Conjunctivae  clear   Sclerae  clear   Pupils and Irises  pupils equal, round, and reactive to light.   Nystagmus  not present     Ears, Nose, Mouth and Throat  Ears  External Ears  appearance within normal " limits, no lesions present   Otoscopic Examination  tympanic membrane appearance within normal limits bilaterally without perforations, well-aerated middle ears   Hearing  intact to conversational voice both ears   Tunning fork testing    Rinne:  Lee:    Nose  External Nose  appearance normal   Intranasal Exam  mucosa within normal limits, vestibules normal, no intranasal lesions present, septum midline, sinuses non tender to percussion   Modified Berhane Test:    Oral Cavity  Oral Mucosa  oral mucosa normal without pallor or cyanosis   Lips  lip appearance normal   Teeth  normal dentition for age   Gums  gums pink, non-swollen, no bleeding present   Tongue  tongue appearance normal; normal mobility   Palate  hard palate normal, soft palate appearance normal with symmetric mobility     Throat  Oropharynx  no inflammation or lesions present, tonsils within normal limits   Hypopharynx  appearance within normal limits   Larynx  voice normal     Neck  Inspection/Palpation  Incisional scar is healed well and now the scar tissue is off of the trachea.    Lymphatic  Neck  no lymphadenopathy present   Supraclavicular Nodes  no lymphadenopathy present   Preauricular Nodes  no lymphadenopathy present     Respiratory  Respiratory Effort  breathing unlabored   Inspection of Chest  normal appearance, no retractions     Musculoskeletal   Cervical back: Normal range of motion and neck supple.      Skin and Subcutaneous Tissue  General Inspection  Regarding face and neck - there are no rashes present, no lesions present, and no areas of discoloration     Neurologic  Cranial Nerves  cranial nerves II-XII are grossly intact bilaterally   Gait and Station  normal gait, able to stand without diffculty    Psychiatric  Judgement and Insight  judgment and insight intact   Mood and Affect  mood normal, affect appropriate       RESULTS REVIEWED    I have reviewed the following information:   [x]  Previous Internal Note  []  Previous  "External Note:   [x]  Ordered Tests & Results:      Pathology:   Lab Results   Component Value Date    CASEREPORT  01/31/2024     Surgical Pathology Report                         Case: [id number]                                  Authorizing Provider:  Shola Kim MD  Collected:           01/31/2024 09:21 AM          Ordering Location:     [facility] Received:            01/31/2024 11:29 AM                                 SUITES                                                                       Pathologist:           Ryan Mccurdy MD                                                            Specimen:    Large Intestine, Sigmoid Colon, SIGMOID COLON POLYP                                        CLININFO  01/31/2024     Encounter for screening for malignant neoplasm of colon  History of colon polyps      FINALDX  01/31/2024     Sigmoid colon polyp, biopsy:   - No tissue present (fecal material only)        GROSSDES  01/31/2024     1. Large Intestine, Sigmoid Colon.  Received in formalin and labeled \" sigmoid colon polyp\" is a 0.7 cm aggregate of tan soft tissue fragments. The specimen is entirely submitted in one cassette.   LUZ MARIA      MICRO  01/31/2024     Microscopic examination performed.         TSH   Date Value Ref Range Status   04/17/2024 3.080 0.270 - 4.200 uIU/mL Final     T3, Free   Date Value Ref Range Status   04/17/2024 2.19 2.00 - 4.40 pg/mL Final     Free T4   Date Value Ref Range Status   04/17/2024 1.51 0.93 - 1.70 ng/dL Final     Comment:     T4 results may be falsely increased if patient taking Biotin.     PTH, Intact   Date Value Ref Range Status   12/06/2023 27.3 15.0 - 65.0 pg/mL Final     Calcium   Date Value Ref Range Status   04/17/2024 10.3 8.6 - 10.5 mg/dL Final     25 Hydroxy, Vitamin D   Date Value Ref Range Status   04/17/2024 54.9 30.0 - 100.0 ng/ml Final       No Images in the past 120 days found..    I have discussed the interpretation of the above results " with the patient.    Procedures          Assessment and Plan   Diagnoses and all orders for this visit:    1. Hypothyroidism (acquired) (Primary)  -     levothyroxine (Synthroid) 50 MCG tablet; Take 1 tablet by mouth Daily.  Dispense: 90 tablet; Refill: 3    2. History of lobectomy of thyroid        Leilani Ordaz  reports that she has never smoked. She has been exposed to tobacco smoke. She has never used smokeless tobacco.     Plan:  Patient Instructions   1.  Patient's thyroid function test levels are excellent.  I would recommend continuation of levothyroxine at 50 mcg p.o. daily.  Patient have requested that we change to generic levothyroxine.  2.  I am going to relinquish my care to Dr. Tavares who will continue to monitor and manage the thyroid medication.  3.  I will see patient as needed      Follow Up   Return if symptoms worsen or fail to improve.  Patient was given instructions and counseling regarding her condition or for health maintenance advice. Please see specific information pulled into the AVS if appropriate.

## 2024-04-22 NOTE — PATIENT INSTRUCTIONS
1.  Patient's thyroid function test levels are excellent.  I would recommend continuation of levothyroxine at 50 mcg p.o. daily.  Patient have requested that we change to generic levothyroxine.  2.  I am going to relinquish my care to Dr. Tavares who will continue to monitor and manage the thyroid medication.  3.  I will see patient as needed

## 2024-06-18 ENCOUNTER — OFFICE VISIT (OUTPATIENT)
Dept: FAMILY MEDICINE CLINIC | Facility: CLINIC | Age: 69
End: 2024-06-18
Payer: COMMERCIAL

## 2024-06-18 VITALS
BODY MASS INDEX: 26.98 KG/M2 | SYSTOLIC BLOOD PRESSURE: 142 MMHG | HEART RATE: 74 BPM | OXYGEN SATURATION: 98 % | TEMPERATURE: 97.3 F | DIASTOLIC BLOOD PRESSURE: 72 MMHG | HEIGHT: 65 IN | WEIGHT: 161.9 LBS

## 2024-06-18 DIAGNOSIS — G89.29 BILATERAL CHRONIC KNEE PAIN: ICD-10-CM

## 2024-06-18 DIAGNOSIS — E89.0 POSTOPERATIVE HYPOTHYROIDISM: ICD-10-CM

## 2024-06-18 DIAGNOSIS — E11.65 TYPE 2 DIABETES MELLITUS WITH HYPERGLYCEMIA, WITHOUT LONG-TERM CURRENT USE OF INSULIN: ICD-10-CM

## 2024-06-18 DIAGNOSIS — E89.0 HISTORY OF PARTIAL THYROIDECTOMY: ICD-10-CM

## 2024-06-18 DIAGNOSIS — M54.2 NECK PAIN: ICD-10-CM

## 2024-06-18 DIAGNOSIS — Z51.81 MEDICATION MONITORING ENCOUNTER: ICD-10-CM

## 2024-06-18 DIAGNOSIS — I10 ESSENTIAL HYPERTENSION: Primary | ICD-10-CM

## 2024-06-18 DIAGNOSIS — E55.9 VITAMIN D DEFICIENCY: ICD-10-CM

## 2024-06-18 DIAGNOSIS — M25.562 BILATERAL CHRONIC KNEE PAIN: ICD-10-CM

## 2024-06-18 DIAGNOSIS — M25.561 BILATERAL CHRONIC KNEE PAIN: ICD-10-CM

## 2024-06-18 DIAGNOSIS — E78.2 MIXED HYPERLIPIDEMIA: ICD-10-CM

## 2024-06-18 DIAGNOSIS — N18.31 STAGE 3A CHRONIC KIDNEY DISEASE: ICD-10-CM

## 2024-06-18 DIAGNOSIS — R59.9 SWOLLEN GLAND: ICD-10-CM

## 2024-06-18 PROCEDURE — 99214 OFFICE O/P EST MOD 30 MIN: CPT | Performed by: FAMILY MEDICINE

## 2024-06-18 NOTE — PROGRESS NOTES
Chief Complaint  Diabetes  Hypertension      Subjective          Leilani Ordaz presents to [facility]  History of Present Illness  Patient presents today to follow-up for diabetes and hypertension.  She had labs done back in April which they were done early as she was having labs done with ENT as well.  Her vitamin D level was 54.9.  She continues to take calcium as well as vitamin D supplementation.  Lipid panel showed the LDL at 52.  She currently takes atorvastatin 80 mg daily.  We did discuss continuing current management.  A1c was 6.6%.  Patient is currently taking Jardiance 10 mg daily as well as metformin 1000 mg twice daily.  CMP shows stable chronic kidney disease.  I did discuss with her avoiding NSAIDs.  She was recently prescribed NSAIDs/meloxicam by orthopedics.  We discussed holding off on this medication due to chronic kidney disease.  I discussed with patient getting an ultrasound for further evaluation.  CBC showed no anemia with normal platelet count.  White blood cell count was slightly elevated.  Plan to have the labs repeated again in 3 months.  As far as her thyroid is concerned her TSH was normal back in April.  She is currently taking 50 mcg daily.  She was seeing ENT as she did have a left thyroidectomy as well as isthmusectomy.  She had a benign colloid nodule.  She overall has been doing well.  She will no longer see ENT.  He did report that she mention ENT that she feels swelling in her neck periodically.  She reports that she was told that this would be attributable to her age.  I did discuss with her getting a soft tissue neck CT for further evaluation.  Blood pressure has been adequately controlled taking lisinopril 20 mg daily.  It is elevated today but she reports blood pressure in the 110s at home systolic.    Current Outpatient Medications   Medication Instructions    albuterol sulfate  (90 Base) MCG/ACT inhaler 2 puffs, Every 4 Hours PRN  "   Aspirin Low Dose 81 mg, Oral, Daily    atorvastatin (LIPITOR) 80 mg, Oral, Daily    calcium carbonate (OS-DIANE) 600 mg, Oral, Daily    cholecalciferol (VITAMIN D3) 1,000 Units, Oral, Daily    Diclofenac Sodium (VOLTAREN) 4 g, Topical, 4 Times Daily PRN    empagliflozin (JARDIANCE) 10 mg, Oral, Daily    fish oil 1,000 mg, Oral, 2 Times Daily With Meals    fluticasone (FLONASE) 50 MCG/ACT nasal spray 2 sprays, Nasal, Daily    HYDROcodone-acetaminophen (NORCO) 7.5-325 MG per tablet 1 tablet, Oral, Every 4 Hours PRN    levothyroxine (SYNTHROID) 50 mcg, Oral, Daily    lisinopril (PRINIVIL,ZESTRIL) 20 mg, Oral, Daily    loratadine (CLARITIN) 10 mg, Oral, Daily    metFORMIN (GLUCOPHAGE) 1,000 mg, Oral, 2 Times Daily    montelukast (SINGULAIR) 10 mg, Oral, Nightly       The following portions of the patient's history were reviewed and updated as appropriate: allergies, current medications, past family history, past medical history, past social history, past surgical history, and problem list.    Objective   Vital Signs:   /72   Pulse 74   Temp 97.3 °F (36.3 °C)   Ht 165.1 cm (65\")   Wt 73.4 kg (161 lb 14.4 oz)   SpO2 98%   BMI 26.94 kg/m²     BP Readings from Last 3 Encounters:   06/18/24 142/72   04/22/24 146/72   03/18/24 140/84     Wt Readings from Last 3 Encounters:   06/18/24 73.4 kg (161 lb 14.4 oz)   03/18/24 76.6 kg (168 lb 12.8 oz)   01/31/24 74 kg (163 lb 2.3 oz)     BMI is >= 25 and <30. (Overweight) The following options were offered after discussion;: exercise counseling/recommendations and nutrition counseling/recommendations     Physical Exam  Vitals reviewed.   Constitutional:       Appearance: Normal appearance.   HENT:      Head: Normocephalic and atraumatic.      Right Ear: External ear normal.      Left Ear: External ear normal.      Nose: Nose normal.   Eyes:      Conjunctiva/sclera: Conjunctivae normal.   Neck:      Comments: No neck mass or lymphadenopathy noted.   Cardiovascular:      " Rate and Rhythm: Normal rate and regular rhythm.      Heart sounds: No murmur heard.     No friction rub. No gallop.   Pulmonary:      Effort: Pulmonary effort is normal.      Breath sounds: Normal breath sounds. No wheezing or rhonchi.   Abdominal:      General: Bowel sounds are normal. There is no distension.      Palpations: Abdomen is soft.      Tenderness: There is no abdominal tenderness.   Skin:     General: Skin is warm and dry.   Neurological:      Mental Status: She is alert and oriented to person, place, and time.      Cranial Nerves: No cranial nerve deficit.   Psychiatric:         Mood and Affect: Mood and affect normal.         Behavior: Behavior normal.         Thought Content: Thought content normal.         Judgment: Judgment normal.            Result Review :   The following data was reviewed by: Js Tavares DO on 06/18/2024:  Common labs          12/6/2023    04:41 3/12/2024    08:40 4/17/2024    13:13   Common Labs   Glucose  114  128    BUN  14  15    Creatinine  1.11  1.22    Sodium  142  138    Potassium  4.1  4.4    Chloride  104  103    Calcium 9.0  9.6  10.3    Albumin  4.6  4.2    Total Bilirubin  0.6  0.3    Alkaline Phosphatase  78  67    AST (SGOT)  20  17    ALT (SGPT)  15  13    WBC  9.92  11.91    Hemoglobin  13.6  12.2    Hematocrit  41.2  37.1    Platelets  282  278    Total Cholesterol  123  111    Triglycerides  92  138    HDL Cholesterol  40  35    LDL Cholesterol   65  52    Hemoglobin A1C  6.50  6.60             Lab Results   Component Value Date    BILIRUBINUR Negative 04/20/2023       Procedures        Assessment and Plan    Diagnoses and all orders for this visit:    1. Essential hypertension (Primary)    2. Stage 3a chronic kidney disease  -     US Renal Bilateral; Future    3. Bilateral chronic knee pain    4. Postoperative hypothyroidism  -     TSH+Free T4; Future    5. Medication monitoring encounter    6. Swollen gland  -     CT Soft Tissue Neck With Contrast;  Future    7. Neck pain  -     CT Soft Tissue Neck With Contrast; Future    8. History of partial thyroidectomy  -     CT Soft Tissue Neck With Contrast; Future    9. Type 2 diabetes mellitus with hyperglycemia, without long-term current use of insulin  -     CBC & Differential; Future  -     Comprehensive Metabolic Panel; Future  -     Hemoglobin A1c; Future  -     Urinalysis With Microscopic - Urine, Clean Catch; Future    10. Vitamin D deficiency  -     Vitamin D,25-Hydroxy; Future    11. Mixed hyperlipidemia  -     Lipid Panel; Future    Plan as documented above.  I did discuss with patient having labs repeated in 3 months.  We did discuss getting a soft tissue neck CT for further evaluation given the ongoing concerns that she is experiencing.  I did discuss with her continuing current management for hypertension as well as type 2 diabetes and hyperlipidemia.  I will see her back in 3 months or sooner if needed.      Medications Discontinued During This Encounter   Medication Reason    meloxicam (MOBIC) 15 MG tablet           Follow Up   Return in about 3 months (around 9/18/2024) for diabetes.  Patient was given instructions and counseling regarding her condition or for health maintenance advice. Please see specific information pulled into the AVS if appropriate.       Js Tavares DO  06/18/24  12:33 EDT

## 2024-06-20 ENCOUNTER — TRANSCRIBE ORDERS (OUTPATIENT)
Dept: ADMINISTRATIVE | Facility: HOSPITAL | Age: 69
End: 2024-06-20
Payer: COMMERCIAL

## 2024-06-20 DIAGNOSIS — Z12.31 VISIT FOR SCREENING MAMMOGRAM: Primary | ICD-10-CM

## 2024-06-24 RX ORDER — EMPAGLIFLOZIN 10 MG/1
10 TABLET, FILM COATED ORAL DAILY
Qty: 30 TABLET | Refills: 2 | Status: SHIPPED | OUTPATIENT
Start: 2024-06-24

## 2024-08-15 ENCOUNTER — HOSPITAL ENCOUNTER (OUTPATIENT)
Dept: ULTRASOUND IMAGING | Facility: HOSPITAL | Age: 69
Discharge: HOME | End: 2024-08-15
Payer: MEDICARE

## 2024-08-15 ENCOUNTER — HOSPITAL ENCOUNTER (OUTPATIENT)
Dept: CT IMAGING | Facility: HOSPITAL | Age: 69
Discharge: HOME | End: 2024-08-15
Payer: MEDICARE

## 2024-08-15 DIAGNOSIS — M54.2 NECK PAIN: ICD-10-CM

## 2024-08-15 DIAGNOSIS — E89.0 HISTORY OF PARTIAL THYROIDECTOMY: ICD-10-CM

## 2024-08-15 DIAGNOSIS — N18.31 STAGE 3A CHRONIC KIDNEY DISEASE: ICD-10-CM

## 2024-08-15 DIAGNOSIS — R59.9 SWOLLEN GLAND: ICD-10-CM

## 2024-08-15 LAB
CREAT BLDA-MCNC: 1.2 MG/DL (ref 0.6–1.3)
EGFRCR SERPLBLD CKD-EPI 2021: 49.4 ML/MIN/1.73

## 2024-08-15 PROCEDURE — 82565 ASSAY OF CREATININE: CPT

## 2024-08-15 PROCEDURE — 70491 CT SOFT TISSUE NECK W/DYE: CPT

## 2024-08-15 PROCEDURE — 76775 US EXAM ABDO BACK WALL LIM: CPT

## 2024-08-15 PROCEDURE — 25510000001 IOPAMIDOL PER 1 ML: Performed by: FAMILY MEDICINE

## 2024-08-15 RX ADMIN — IOPAMIDOL 75 ML: 755 INJECTION, SOLUTION INTRAVENOUS at 09:36

## 2024-08-21 ENCOUNTER — HOSPITAL ENCOUNTER (OUTPATIENT)
Dept: MAMMOGRAPHY | Facility: HOSPITAL | Age: 69
Discharge: HOME | End: 2024-08-21
Admitting: FAMILY MEDICINE
Payer: MEDICARE

## 2024-08-21 DIAGNOSIS — Z12.31 VISIT FOR SCREENING MAMMOGRAM: ICD-10-CM

## 2024-08-21 PROCEDURE — 77067 SCR MAMMO BI INCL CAD: CPT

## 2024-08-21 PROCEDURE — 77063 BREAST TOMOSYNTHESIS BI: CPT

## 2024-09-04 ENCOUNTER — EXTERNAL PBMM DATA (OUTPATIENT)
Dept: PHARMACY | Facility: OTHER | Age: 69
End: 2024-09-04
Payer: COMMERCIAL

## 2024-10-07 ENCOUNTER — OFFICE VISIT (OUTPATIENT)
Dept: PODIATRY | Facility: CLINIC | Age: 69
End: 2024-10-07
Payer: MEDICARE

## 2024-10-07 VITALS
DIASTOLIC BLOOD PRESSURE: 82 MMHG | BODY MASS INDEX: 25.33 KG/M2 | WEIGHT: 152 LBS | HEIGHT: 65 IN | HEART RATE: 66 BPM | SYSTOLIC BLOOD PRESSURE: 142 MMHG | OXYGEN SATURATION: 98 % | TEMPERATURE: 98.2 F

## 2024-10-07 DIAGNOSIS — E11.9 NON-INSULIN DEPENDENT TYPE 2 DIABETES MELLITUS: Primary | ICD-10-CM

## 2024-10-07 DIAGNOSIS — S93.402A SPRAIN OF LEFT ANKLE, UNSPECIFIED LIGAMENT, INITIAL ENCOUNTER: ICD-10-CM

## 2024-10-07 DIAGNOSIS — M25.572 ACUTE LEFT ANKLE PAIN: ICD-10-CM

## 2024-10-07 DIAGNOSIS — G62.9 NEUROPATHY: ICD-10-CM

## 2024-10-07 DIAGNOSIS — E11.42 TYPE 2 DIABETES MELLITUS WITH POLYNEUROPATHY: ICD-10-CM

## 2024-10-07 PROCEDURE — 3077F SYST BP >= 140 MM HG: CPT | Performed by: PODIATRIST

## 2024-10-07 PROCEDURE — G8404 LOW EXTEMITY NEUR EXAM DOCUM: HCPCS | Performed by: PODIATRIST

## 2024-10-07 PROCEDURE — 99214 OFFICE O/P EST MOD 30 MIN: CPT | Performed by: PODIATRIST

## 2024-10-07 PROCEDURE — 3079F DIAST BP 80-89 MM HG: CPT | Performed by: PODIATRIST

## 2024-10-07 PROCEDURE — 1160F RVW MEDS BY RX/DR IN RCRD: CPT | Performed by: PODIATRIST

## 2024-10-07 PROCEDURE — 1159F MED LIST DOCD IN RCRD: CPT | Performed by: PODIATRIST

## 2024-10-07 NOTE — PROGRESS NOTES
[facility] - PODIATRY    Today's Date: 10/07/24    Patient Name: Leilani Ordaz  MRN: [id number]  CSN: [id number]  PCP: Js Tavares DO, Last PCP Visit: 24 November 2023  Referring Provider: No ref. provider found    SUBJECTIVE     Chief Complaint   Patient presents with    Left Foot - Numbness     Concerned about numbness since twisting ankle months ago  Went to an , had xray, no fracture  No pain now, numbness on side of foot up to ankle area, swells sometimes     HPI: Leilani Ordaz, a 69 y.o.female, presents to clinic for a diabetic foot evaluation.    New, Established, New Problem: Established    Duration:  2011    Onset:  insidious    Nature:  NIDDM    Stable, worsening, improving:  stable    Patient controlling diabetes via:  Oral meds    Not required to check blood sugars at home.    New, Established, New Problem: New problem  Location: Left lateral ankle left lateral foot.  Duration: July 4, 2024  Onset: Twisted her ankle while camping trip.  Nature: Numbness in her left lateral ankle and foot.  Patient reports bruising after twisting her ankle.  Aggravating factors:  Patient relates pain is aggravated by shoe gear and ambulation.    Previous Treatment: X-ray at urgent care [place], [place].  Patient reports that she was told there is no fractures in this area.    Patient denies any fevers, chills, nausea, vomiting, shortness of breathe, nor any other constitutional signs nor symptoms.    Past Medical History:   Diagnosis Date    Allergic     Arthritis     Broken bones     Cataract     Cholelithiasis     Colon polyp     Coronary artery disease     PER PCP NOTE. ECHO/STRESS DONE 2023    DDD (degenerative disc disease), lumbar     L4-L5    Diabetes     Diverticulosis     Esophageal reflux     Fibromyalgia, primary     Ganglion cyst 12/18/2018    Hemorrhoids     HLD (hyperlipidemia)     HTN (hypertension) 12/18/2018    Hyperlipemia     Limb swelling     Low back pain      Migraine     Neuropathy in diabetes 07/01/2019    OR SOONER    Night sweats     Seasonal allergies     Sinus congestion     Sinus trouble     Skin disease     Syncope     Type 2 diabetes mellitus with stage 3 chronic kidney disease, without long-term current use of insulin 12/18/2018     Past Surgical History:   Procedure Laterality Date    COLONOSCOPY  2018    FOLLOW UP IN 5 YEARS (2023)    COLONOSCOPY N/A 1/31/2024    Procedure: COLONOSCOPY WITH POLYPECTROMY;  Surgeon: Shola Kim MD;  Location: [facility] ENDOSCOPY;  Service: Gastroenterology;  Laterality: N/A;  DIVERTICULOSIS, COLON POLYP    CYST REMOVAL      FROM THROAT    EYE SURGERY      CATARACT SURGERY    HAND SURGERY      CALCIUM REMOVED FROM THUMB    THYROIDECTOMY Bilateral 12/5/2023    Procedure: LEFT THYROIDECTOMY with isthmusectomy and FROZEN SECTION, , RECURRENT LARYNGEAL NERVE MONITORING;  Surgeon: Drew Nicole MD;  Location: [facility] MAIN OR;  Service: ENT;  Laterality: Bilateral;     Family History   Problem Relation Age of Onset    Heart disease Mother     Diabetes Mother     Arthritis Mother     Heart disease Father     Diabetes Father     Arthritis Father     Rashes / Skin problems Father     Arthritis Sister     Diabetes Daughter     Diabetes Son     Heart disease Other     Diabetes Other     Heart disease Other     Diabetes Other     Heart disease Other     Diabetes Other     Colon cancer Neg Hx      Social History     Socioeconomic History    Marital status:    Tobacco Use    Smoking status: Never     Passive exposure: Past    Smokeless tobacco: Never   Vaping Use    Vaping status: Never Used   Substance and Sexual Activity    Alcohol use: Not Currently     Comment: LIQUOR BUT VARY RARELY    Drug use: Not Currently    Sexual activity: Yes     Partners: Male     Birth control/protection: None     No Known Allergies  Current Outpatient Medications   Medication Sig Dispense Refill    albuterol sulfate  (90 Base) MCG/ACT  inhaler INHALE 2 PUFFS BY MOUTH EVERY 4 HOURS AS NEEDED FOR WHEEZING 8.5 g 1    Aspirin Low Dose 81 MG EC tablet Take 1 tablet by mouth Daily. 90 tablet 3    atorvastatin (LIPITOR) 80 MG tablet TAKE 1 TABLET BY MOUTH DAILY 90 tablet 3    calcium carbonate (OS-DIANE) 600 MG tablet Take 1 tablet by mouth Daily.      Cholecalciferol 25 MCG (1000 UT) tablet Take 1 tablet by mouth Daily.      Diclofenac Sodium (VOLTAREN) 1 % gel gel Apply 4 g topically to the appropriate area as directed 4 (Four) Times a Day As Needed (joint pain). 100 g 1    fluticasone (FLONASE) 50 MCG/ACT nasal spray 2 sprays into the nostril(s) as directed by provider Daily. 16 g 1    Jardiance 10 MG tablet tablet TAKE 1 TABLET BY MOUTH DAILY 30 tablet 2    levothyroxine (Synthroid) 50 MCG tablet Take 1 tablet by mouth Daily. 90 tablet 3    lisinopril (PRINIVIL,ZESTRIL) 20 MG tablet Take 1 tablet by mouth Daily. 90 tablet 3    loratadine (Claritin) 10 MG tablet Take 1 tablet by mouth Daily. 90 tablet 3    metFORMIN (GLUCOPHAGE) 1000 MG tablet TAKE 1 TABLET BY MOUTH TWICE A  tablet 3    montelukast (Singulair) 10 MG tablet Take 1 tablet by mouth Every Night. 90 tablet 3    Omega-3 Fatty Acids (fish oil) 1000 MG capsule capsule Take 1 capsule by mouth 2 (Two) Times a Day With Meals.      HYDROcodone-acetaminophen (NORCO) 7.5-325 MG per tablet Take 1 tablet by mouth Every 4 (Four) Hours As Needed for Moderate Pain (Pain). (Patient not taking: Reported on 6/18/2024) 20 tablet 0     No current facility-administered medications for this visit.     Review of Systems   Constitutional: Negative.    Neurological:  Positive for numbness.   All other systems reviewed and are negative.      OBJECTIVE     Vitals:    10/07/24 0743   BP: 142/82   Pulse: 66   Temp: 98.2 °F (36.8 °C)   SpO2: 98%       Body mass index is 25.29 kg/m².    Lab Results   Component Value Date    HGBA1C 6.60 (H) 04/17/2024       Lab Results   Component Value Date    GLUCOSE 128 (H)  04/17/2024    CALCIUM 10.3 04/17/2024     04/17/2024    K 4.4 04/17/2024    CO2 23.8 04/17/2024     04/17/2024    BUN 15 04/17/2024    CREATININE 1.20 08/15/2024    EGFRIFNONA 47 (L) 11/02/2021    BCR 12.3 04/17/2024    ANIONGAP 11.2 04/17/2024       Patient seen in no apparent distress.      PHYSICAL EXAM:     Foot/Ankle Exam    GENERAL  Diabetic foot exam performed    Appearance:  elderly  Orientation:  AAOx3  Affect:  appropriate  Gait:  unimpaired  Assistance:  independent  Right shoe gear: casual shoe  Left shoe gear: casual shoe    VASCULAR     Right Foot Vascularity   Dorsalis pedis:  2+  Posterior tibial:  2+  Skin temperature:  warm  Edema grading:  None  CFT:  < 3 seconds  Pedal hair growth:  Absent  Varicosities:  mild varicosities     Left Foot Vascularity   Dorsalis pedis:  2+  Posterior tibial:  2+  Skin temperature:  warm  Edema grading:  None  CFT:  < 3 seconds  Pedal hair growth:  Absent  Varicosities:  mild varicosities     NEUROLOGIC     Right Foot Neurologic   Light touch sensation: diminished  Vibratory sensation: diminished  Hot/Cold sensation: diminished  Protective Sensation using [place]-Kimberly Monofilament:   Sites intact: 6  Sites tested: 10     Left Foot Neurologic   Light touch sensation: diminished  Vibratory sensation: diminished  Hot/Cold sensation:  diminished  Protective Sensation using [place]on-Kimberly Monofilament:   Sites intact: 3  Sites tested: 10    MUSCLE STRENGTH     Right Foot Muscle Strength   Foot dorsiflexion:  4  Foot plantar flexion:  4  Foot inversion:  4  Foot eversion:  4     Left Foot Muscle Strength   Foot dorsiflexion:  4  Foot plantar flexion:  4  Foot inversion:  4  Foot eversion:  4    RANGE OF MOTION     Right Foot Range of Motion   Foot and ankle ROM within normal limits       Left Foot Range of Motion   Foot and ankle ROM within normal limits      DERMATOLOGIC      Right Foot Dermatologic   Skin  Right foot skin is intact.      Left Foot  Dermatologic   Skin  Left foot skin is intact.     TESTS     Right Foot Tests   Anterior drawer: negative     Left Foot Tests   Anterior drawer: negative    Diabetic Foot Exam Performed and Monofilament Test Performed    ASSESSMENT/PLAN     Diagnoses and all orders for this visit:    1. Non-insulin dependent type 2 diabetes mellitus (Primary)    2. Type 2 diabetes mellitus with polyneuropathy    3. Neuropathy    4. Sprain of left ankle, unspecified ligament, initial encounter  -     Ambulatory Referral to Physical Therapy for Evaluation & Treatment  -     MRI Ankle Left Without Contrast; Future    5. Acute left ankle pain      Medications and allergies reviewed.  Reviewed available blood glucose and HgB A1C lab values along with other pertinent labs.  These were discussed with the patient as to their importance of diabetic maintenance.    Comprehensive lower extremity examination and evaluation was performed.    Discussed findings and treatment plan including risks, benefits, and treatment options with patient in detail. Patient agreed with treatment plan.    Rice Therapy: It is important to treat any injury as soon as possible to help control swelling and increase recovery time. The recognized regimen for immediate treatment of sport injuries includes rest, ice (cold application), compression, and elevation (RICE). Remove the injured athlete from play, apply ice to the affected area, wrap or compress the injured area with an elastic bandage when appropriate, and elevate the injured area above heart level to reduce swelling.  The patient is to not use ice for longer than 20 minutes at a time, with at least 20 minutes of no ice usage between applications.     Patient instructed use OTC analgesics with dosing per package insert as needed.      The patient states understanding and agreement with this plan.    Diabetic foot exam performed and documented this date, compliant with [facility] required standards. Detail of  findings as noted in physical exam.  Lower extremity Neurologic exam for diabetic patient performed and documented this date, compliant with PQRS required standards. Detail of findings as noted in physical exam.  Advised patient importance of good routine lower extremity hygiene. Advised patient importance of evaluating for intact skin and pain free nail borders.  Advised patient to use mirror to evaluate plantar/ soles of feet for better visualization. Advised patient monitor and phone office to be seen if any cracking to skin, open lesions, painful nail borders or if nails become elongated prior to next visit. Advised patient importance of daily cleansing of lower extremities, followed by good skin cream to maintain normal hydration of skin. Also advised patient importance of close daily monitoring of blood sugar. Advised to regulate diet and medications to maintain control of blood sugar in optimal range. Contact primary care provider if difficulties maintaining blood sugar levels.  Advised Patient of presence of Diabetes Mellitus condition.  Advised Patient risk of progression and worsening or improvement, then return of condition.  Will monitor condition for any change in future. Treat with most appropriate treatment pending status of condition.  Counseled and advised patient extensively on nature and ramifications of diabetes. Standard instructions given to patient for good diabetic foot care and maintenance. Advised importance of careful monitoring to avoid break down and complications secondary to diabetes. Advised patient importance of strict maintenance of blood sugar control. Advised patient of possible ominous results from neglect of condition, i.e.: amputation/ loss of digits, feet and legs, or even death.  Patient states understands counseling, will monitor closely, continue good hygiene and routine diabetic foot care. Patient will contact office is questions or problems.      An After Visit Summary  was printed and given to the patient at discharge, including (if requested) any available informative/educational handouts regarding diagnosis, treatment, or medications. All questions were answered to patient/family satisfaction. Should symptoms fail to improve or worsen they agree to call or return to clinic or to go to the Emergency Department. Discussed the importance of following up with any needed screening tests/labs/specialist appointments and any requested follow-up recommended by me today. Importance of maintaining follow-up discussed and patient accepts that missed appointments can delay diagnosis and potentially lead to worsening of conditions.    Return in about 4 weeks (around 11/4/2024) for PT f/u and MRI f/u; DFE next year., or sooner if acute issues arise.    This document has been electronically signed by Dandre Rebolledo DPM on October 7, 2024 08:16 EDT

## 2024-10-16 ENCOUNTER — HOSPITAL ENCOUNTER (OUTPATIENT)
Dept: MRI IMAGING | Facility: HOSPITAL | Age: 69
Discharge: HOME | End: 2024-10-16
Admitting: PODIATRIST
Payer: MEDICARE

## 2024-10-16 ENCOUNTER — TREATMENT (OUTPATIENT)
Dept: PHYSICAL THERAPY | Facility: CLINIC | Age: 69
End: 2024-10-16
Payer: MEDICARE

## 2024-10-16 DIAGNOSIS — M25.572 LEFT ANKLE PAIN, UNSPECIFIED CHRONICITY: ICD-10-CM

## 2024-10-16 DIAGNOSIS — S93.402A SPRAIN OF LEFT ANKLE, UNSPECIFIED LIGAMENT, INITIAL ENCOUNTER: Primary | ICD-10-CM

## 2024-10-16 DIAGNOSIS — R26.2 DIFFICULTY WALKING: ICD-10-CM

## 2024-10-16 DIAGNOSIS — S93.402A SPRAIN OF LEFT ANKLE, UNSPECIFIED LIGAMENT, INITIAL ENCOUNTER: ICD-10-CM

## 2024-10-16 PROCEDURE — 73721 MRI JNT OF LWR EXTRE W/O DYE: CPT

## 2024-10-16 NOTE — PROGRESS NOTES
Physical Therapy Initial Evaluation and Plan of Care  [address] [facility], [address] [place] [address]        Patient: Leilani Ordaz   : 1955  Diagnosis/ICD-10 Code:  Sprain of left ankle, unspecified ligament, initial encounter [S93.402A]  Referring practitioner: Dandre Rebolledo, *  Date of Initial Visit: 10/16/2024  Today's Date: 10/16/2024  Patient seen for 1 sessions         Subjective Questionnaire: LEFS: 54/80      Subjective Evaluation    History of Present Illness  Mechanism of injury: Pt reports that she sprained her ankle on 2024.  Pt reports that she had swelling and brusing in L ankle/foot.  Pt reports that she still has intermittent swelling.  Pt reports that she has tingling/numbness in L lateral foot and shin.  Pt is going to have a L ankle MRI today.  Pt reports she has difficulty with walking, bowling, stairs due to pain.  Pt reports that she is taking no pain medication at this time.  Pt reports that she is she is not currently working.      Pt has hx of DDD L4-5.      Pain  Current pain ratin  At best pain ratin  At worst pain ratin  Quality: needle-like, pulling and discomfort           Objective          Static Posture     Head  Forward.    Shoulders  Rounded.    Thoracic Spine  Flattened thoracic spine.    Ankle/Foot   Ankle/Foot (Left): Calcaneovalgus and pronated.   Ankle/Foot (Right): Calcaneovalgus and pronated.     Active Range of Motion   Left Ankle/Foot   Dorsiflexion (ke): 5 degrees   Plantar flexion: WFL  Inversion: WFL  Eversion: 13 degrees     Passive Range of Motion   Left Ankle/Foot  Normal passive range of motion    Strength/Myotome Testing     Left Hip   Planes of Motion   Flexion: 4+    Right Hip   Planes of Motion   Flexion: 5    Left Knee   Flexion: 5  Extension: 5    Right Knee   Flexion: 5  Extension: 5    Left Ankle/Foot   Dorsiflexion: 4+  Plantar flexion: 4-  Inversion: 4+  Eversion: 4+    Right Ankle/Foot   Dorsiflexion: 5  Inversion:  5  Eversion: 4+    Muscle Activation     Additional Muscle Activation Details  Fair PPT/TA activation    Tests     Lumbar     Left   Positive quadrant.     Right   Negative quadrant.   Left Ankle/Foot   Negative for anterior drawer, inversion talar tilt, posterior drawer and Ruffin.      General Comments     Ankle/Foot Comments   Mild gastroc/soleus tightness noted  Light touch sensation normal in bilateral lower extremities  No swelling noted  SLS on L WNL         Assessment & Plan       Assessment  Impairments: abnormal gait, abnormal or restricted ROM, activity intolerance, impaired balance, impaired physical strength, lacks appropriate home exercise program and pain with function   Functional limitations: lifting, sleeping, walking, pulling, pushing, uncomfortable because of pain, standing and unable to perform repetitive tasks   Assessment details: Patient presents with signs/symptoms consistent with L ankle sprain including decreased L ankle DF/inversion ROM, L ankle weakness, gait deficits, fibularis longus/brevis tenderness and reports of pain limiting function during ADLs as shown on the LEFS.  Pt may also have lumbar radiculopathy symptoms causing L foot/shin numbness.  Pt reports decreased numbness after back exercises today.  Patient would benefit from skilled PT services in order to address deficits limiting function at this time.  HEP was given to patient this session and education on HEP/diagnosis provided to patient.        Goals  Plan Goals: 1. The patient has limited ROM for the L ankle.   LTG 1: 12 weeks:  In order to allow the patient greater ease with forward, lateral, and diagonal mobility the patient will demonstrate improved ROM of the L ankle as follows:  10 degrees of dorsiflexion and 15 degrees of eversion.  STATUS:  New     2. The patient has limited strength of the L ankle.   LTG 2: 12 weeks:  In order to provide greater joint stability of the L ankle the patient will demonstrate  improved strength of the L ankle as follows:  4+/5 plantar flexion.    STATUS:  New   STG 2a: 6 weeks:  The patient will demonstrate improved strength of the L ankle as follows:  4/5 plantar flexion.    STATUS:  New    3. The patient has gait dysfunction.   LTG 3: 12 weeks:  The patient will ambulate without assistive device, independently, for community distances with minimal limp to the L lower extremity in order to improve mobility and allow patient to perform activities such as grocery shopping with greater ease.    STATUS:  New   STG 3a: The patient will be independent in HEP.    STATUS:  New    4. The patient complains of L ankle pain.  LTG 4: 12 weeks:  The patient will report a pain rating of 1/10 or better in order to improve  tolerance to activities of daily living and improve sleep quality.  STATUS:  New  STG 4a: 6 weeks:  The patient will report a pain rating of 3/10 or better.  STATUS:  New    5. Mobility: Walking/Moving Around Functional Limitation     LTG 5: 12 weeks:  The patient will demonstrate 12% limitation by achieving a score of 70 on the Lower Extremity Functional Scale.    STATUS:  New   STG 5a: 6 weeks:  The patient will demonstrate 25% limitation by achieving a score of 60 on the Lower Extremity Functional Scale.      STATUS:  New        Plan  Therapy options: will be seen for skilled therapy services  Planned modality interventions: TENS, thermotherapy (hydrocollator packs) and cryotherapy  Planned therapy interventions: manual therapy, ADL retraining, balance/weight-bearing training, neuromuscular re-education, postural training, soft tissue mobilization, flexibility, spinal/joint mobilization, functional ROM exercises, strengthening, gait training, stretching, home exercise program, therapeutic activities and joint mobilization  Frequency: 2x week  Duration in weeks: 12  Treatment plan discussed with: patient      Timed:         Manual Therapy:    0     mins  41688;     Therapeutic  Exercise:    10     mins  01520;     Neuromuscular Adam:    0    mins  73412;    Therapeutic Activity:     0     mins  85036;     Gait Trainin     mins  71253;     Ultrasound:     0     mins  59715;    Ionto                               0    mins   30144  Self-care  __13__ mins 90633    Un-Timed:  Electrical Stimulation:    0     mins  22305 ( );  Traction     0     mins 28393  Low Eval     20     Mins  64304  Mod Eval     0     Mins  10702  High Eval                       0     Mins  48518  Hot pack     0     Mins    Cold pack                       0     Mins      Timed Treatment:   23   mins   Total Treatment:     43   mins    PT SIGNATURE: Rachel Hicks PT      Electronically signed 10/16/2024  [place] License: [id number]    Initial Certification    Certification Period: 10/16/2024 thru 2025  NPI: [id number]  I certify that the therapy services are furnished while this patient is under my care.  The services outlined above are required by this patient, and will be reviewed every 90 days.     PHYSICIAN: Dandre Rebolledo DPM      DATE:     Please sign and return via fax to [phone].. Thank you, [facility].

## 2024-10-17 RX ORDER — EMPAGLIFLOZIN 10 MG/1
10 TABLET, FILM COATED ORAL DAILY
Qty: 30 TABLET | Refills: 2 | Status: SHIPPED | OUTPATIENT
Start: 2024-10-17

## 2024-10-21 ENCOUNTER — TREATMENT (OUTPATIENT)
Dept: PHYSICAL THERAPY | Facility: CLINIC | Age: 69
End: 2024-10-21
Payer: MEDICARE

## 2024-10-21 DIAGNOSIS — R26.2 DIFFICULTY WALKING: ICD-10-CM

## 2024-10-21 DIAGNOSIS — S93.402A SPRAIN OF LEFT ANKLE, UNSPECIFIED LIGAMENT, INITIAL ENCOUNTER: Primary | ICD-10-CM

## 2024-10-21 DIAGNOSIS — M25.572 LEFT ANKLE PAIN, UNSPECIFIED CHRONICITY: ICD-10-CM

## 2024-10-21 PROCEDURE — 97110 THERAPEUTIC EXERCISES: CPT | Performed by: PHYSICAL THERAPIST

## 2024-10-21 PROCEDURE — 97530 THERAPEUTIC ACTIVITIES: CPT | Performed by: PHYSICAL THERAPIST

## 2024-10-21 NOTE — PROGRESS NOTES
Physical Therapy Daily Treatment Note  [email or web address], [address], [place] [address]      Patient: Leilani Ordaz   : 1955  Diagnosis/ICD-10 Code:  Sprain of left ankle, unspecified ligament, initial encounter [S93.402A]  Referring practitioner: Dandre Rebolledo, *  Date of Initial Visit: Type: THERAPY  Noted: 10/16/2024  Today's Date: 10/21/2024  Patient seen for 2 sessions           Subjective   Leilani Ordaz reports: moderate L ankle pain.  Pt states that she had increased L ankle pain over the weekend.      Objective   See Exercise, Manual, and Modality Logs for complete treatment.       Assessment/Plan  Patient tolerated all exercise progressions and manual therapy well today but continues to demonstrate L ankle strength/ROM deficits limiting function. Proprioception is decreased on L and requires mod cues to complete exercises correctly without compensation.  Continue to progress per patient tolerance.    Progress per Plan of Care           Timed:  Manual Therapy:    0     mins  38909;  Therapeutic Exercise:    22     mins  90208;     Neuromuscular Adam:    0    mins  51728;    Therapeutic Activity:     8     mins  12499;     Gait Trainin     mins  52382;     Ultrasound:     0     mins  19505;    Electrical Stimulation:    0     mins  84344;  Iontophoresis     0     mins  80988    Untimed:  Electrical Stimulation:    0     mins  46869 ( );  Mechanical Traction:    0     mins  89839;   Fluidotherapy     0     mins  83953  Hot pack     0     Mins    Cold pack                       0     Mins     Timed Treatment:   30   mins   Total Treatment:     30   mins        Rachel Hicks PT    Electronically signed @CHE@  [place] License: [id number]  NPI number: [id number]

## 2024-10-25 ENCOUNTER — OFFICE VISIT (OUTPATIENT)
Dept: PODIATRY | Facility: CLINIC | Age: 69
End: 2024-10-25
Payer: MEDICARE

## 2024-10-25 VITALS
HEART RATE: 60 BPM | BODY MASS INDEX: 25.33 KG/M2 | WEIGHT: 152 LBS | OXYGEN SATURATION: 95 % | SYSTOLIC BLOOD PRESSURE: 129 MMHG | HEIGHT: 65 IN | DIASTOLIC BLOOD PRESSURE: 77 MMHG

## 2024-10-25 DIAGNOSIS — E11.8 DM FEET: ICD-10-CM

## 2024-10-25 DIAGNOSIS — M25.572 ACUTE LEFT ANKLE PAIN: ICD-10-CM

## 2024-10-25 DIAGNOSIS — G62.9 NEUROPATHY: ICD-10-CM

## 2024-10-25 DIAGNOSIS — E11.42 TYPE 2 DIABETES MELLITUS WITH POLYNEUROPATHY: ICD-10-CM

## 2024-10-25 DIAGNOSIS — E11.9 NON-INSULIN DEPENDENT TYPE 2 DIABETES MELLITUS: Primary | ICD-10-CM

## 2024-10-25 NOTE — PROGRESS NOTES
[facility] - PODIATRY    Today's Date: 10/25/24    Patient Name: Leilani Ordaz  MRN: [id number]  CSN: [id number]  PCP: Js Tavares DO, Last PCP Visit: 24 November 2023  Referring Provider: No ref. provider found    SUBJECTIVE     Chief Complaint   Patient presents with    Left Ankle - Follow-up, Pain, Results     MRI follow up  PT f/u  Feels worse       HPI: Leilani Ordaz, a 69 y.o.female, presents to clinic for:    New, Established, New Problem: est  Location: Left lateral ankle left lateral foot.  Duration: July 4, 2024  Onset: Twisted her ankle while camping trip.  Nature: Numbness in her left lateral ankle and foot.  Patient reports bruising after twisting her ankle.  Aggravating factors:  Patient relates pain is aggravated by shoe gear and ambulation.    Previous Treatment: X-ray at urgent [place], [place].  Patient reports that she was told there is no fractures in this area.  MRI and physical therapy since her last visit.  Pt states she has been to two PT sessions.    Patient denies any fevers, chills, nausea, vomiting, shortness of breathe, nor any other constitutional signs nor symptoms.    Past Medical History:   Diagnosis Date    Allergic     Ankle pain, left     Arthritis     Broken bones     Cataract     Cholelithiasis     Colon polyp     Coronary artery disease     PER PCP NOTE. ECHO/STRESS DONE 2023    DDD (degenerative disc disease), lumbar     L4-L5    Diabetes     Diverticulosis     Esophageal reflux     Fibromyalgia, primary     Ganglion cyst 12/18/2018    Hemorrhoids     HLD (hyperlipidemia)     HTN (hypertension) 12/18/2018    Hyperlipemia     Limb swelling     Low back pain     Migraine     Neuropathy in diabetes 07/01/2019    OR SOONER    Night sweats     Seasonal allergies     Sinus congestion     Sinus trouble     Skin disease     Syncope     Type 2 diabetes mellitus with stage 3 chronic kidney disease, without long-term current use of insulin 12/18/2018      Past Surgical History:   Procedure Laterality Date    COLONOSCOPY  2018    FOLLOW UP IN 5 YEARS (2023)    COLONOSCOPY N/A 1/31/2024    Procedure: COLONOSCOPY WITH POLYPECTROMY;  Surgeon: Shola Kim MD;  Location: [facility] ENDOSCOPY;  Service: Gastroenterology;  Laterality: N/A;  DIVERTICULOSIS, COLON POLYP    CYST REMOVAL      FROM THROAT    EYE SURGERY      CATARACT SURGERY    HAND SURGERY      CALCIUM REMOVED FROM THUMB    THYROIDECTOMY Bilateral 12/5/2023    Procedure: LEFT THYROIDECTOMY with isthmusectomy and FROZEN SECTION, , RECURRENT LARYNGEAL NERVE MONITORING;  Surgeon: Drew Nicole MD;  Location: [facility] MAIN OR;  Service: ENT;  Laterality: Bilateral;     Family History   Problem Relation Age of Onset    Heart disease Mother     Diabetes Mother     Arthritis Mother     Heart disease Father     Diabetes Father     Arthritis Father     Rashes / Skin problems Father     Arthritis Sister     Diabetes Daughter     Diabetes Son     Heart disease Other     Diabetes Other     Heart disease Other     Diabetes Other     Heart disease Other     Diabetes Other     Colon cancer Neg Hx      Social History     Socioeconomic History    Marital status:    Tobacco Use    Smoking status: Never     Passive exposure: Past    Smokeless tobacco: Never   Vaping Use    Vaping status: Never Used   Substance and Sexual Activity    Alcohol use: Not Currently     Comment: LIQUOR BUT VARY RARELY    Drug use: Not Currently    Sexual activity: Yes     Partners: Male     Birth control/protection: None     No Known Allergies  Current Outpatient Medications   Medication Sig Dispense Refill    albuterol sulfate  (90 Base) MCG/ACT inhaler INHALE 2 PUFFS BY MOUTH EVERY 4 HOURS AS NEEDED FOR WHEEZING 8.5 g 1    Aspirin Low Dose 81 MG EC tablet Take 1 tablet by mouth Daily. 90 tablet 3    atorvastatin (LIPITOR) 80 MG tablet TAKE 1 TABLET BY MOUTH DAILY 90 tablet 3    calcium carbonate (OS-DIANE) 600 MG tablet Take 1  tablet by mouth Daily.      Cholecalciferol 25 MCG (1000 UT) tablet Take 1 tablet by mouth Daily.      Diclofenac Sodium (VOLTAREN) 1 % gel gel Apply 4 g topically to the appropriate area as directed 4 (Four) Times a Day As Needed (joint pain). 100 g 1    fluticasone (FLONASE) 50 MCG/ACT nasal spray 2 sprays into the nostril(s) as directed by provider Daily. 16 g 1    Jardiance 10 MG tablet tablet TAKE 1 TABLET BY MOUTH DAILY 30 tablet 2    levothyroxine (Synthroid) 50 MCG tablet Take 1 tablet by mouth Daily. 90 tablet 3    lisinopril (PRINIVIL,ZESTRIL) 20 MG tablet Take 1 tablet by mouth Daily. 90 tablet 3    loratadine (Claritin) 10 MG tablet Take 1 tablet by mouth Daily. 90 tablet 3    metFORMIN (GLUCOPHAGE) 1000 MG tablet TAKE 1 TABLET BY MOUTH TWICE A  tablet 3    montelukast (Singulair) 10 MG tablet Take 1 tablet by mouth Every Night. 90 tablet 3    Omega-3 Fatty Acids (fish oil) 1000 MG capsule capsule Take 1 capsule by mouth 2 (Two) Times a Day With Meals.      HYDROcodone-acetaminophen (NORCO) 7.5-325 MG per tablet Take 1 tablet by mouth Every 4 (Four) Hours As Needed for Moderate Pain (Pain). (Patient not taking: Reported on 10/25/2024) 20 tablet 0     No current facility-administered medications for this visit.     Review of Systems   Constitutional: Negative.    Neurological:  Positive for numbness.   All other systems reviewed and are negative.      OBJECTIVE     Vitals:    10/25/24 1019   BP: 129/77   Pulse: 60   SpO2: 95%       Body mass index is 25.29 kg/m².    Lab Results   Component Value Date    HGBA1C 6.60 (H) 04/17/2024       Lab Results   Component Value Date    GLUCOSE 128 (H) 04/17/2024    CALCIUM 10.3 04/17/2024     04/17/2024    K 4.4 04/17/2024    CO2 23.8 04/17/2024     04/17/2024    BUN 15 04/17/2024    CREATININE 1.20 08/15/2024    EGFRIFNONA 47 (L) 11/02/2021    BCR 12.3 04/17/2024    ANIONGAP 11.2 04/17/2024       Patient seen in no apparent distress.      PHYSICAL  EXAM:     Foot/Ankle Exam    GENERAL  Appearance:  elderly  Orientation:  AAOx3  Affect:  appropriate  Gait:  unimpaired  Assistance:  independent  Right shoe gear: casual shoe  Left shoe gear: casual shoe    VASCULAR     Right Foot Vascularity   Dorsalis pedis:  2+  Posterior tibial:  2+  Skin temperature:  warm  Edema grading:  None  CFT:  < 3 seconds  Pedal hair growth:  Absent  Varicosities:  mild varicosities     Left Foot Vascularity   Dorsalis pedis:  2+  Posterior tibial:  2+  Skin temperature:  warm  Edema grading:  None  CFT:  < 3 seconds  Pedal hair growth:  Absent  Varicosities:  mild varicosities     NEUROLOGIC     Right Foot Neurologic   Light touch sensation: diminished  Vibratory sensation: diminished  Hot/Cold sensation: diminished  Protective Sensation using [place] Monofilament:   Sites intact: 6  Sites tested: 10     Left Foot Neurologic   Light touch sensation: diminished  Vibratory sensation: diminished  Hot/Cold sensation:  diminished  Protective Sensation using [place] Monofilament:   Sites intact: 3  Sites tested: 10    MUSCLE STRENGTH     Right Foot Muscle Strength   Foot dorsiflexion:  4  Foot plantar flexion:  4  Foot inversion:  4  Foot eversion:  4     Left Foot Muscle Strength   Foot dorsiflexion:  4  Foot plantar flexion:  4  Foot inversion:  4  Foot eversion:  4    RANGE OF MOTION     Right Foot Range of Motion   Foot and ankle ROM within normal limits       Left Foot Range of Motion   Foot and ankle ROM within normal limits      DERMATOLOGIC      Right Foot Dermatologic   Skin  Right foot skin is intact.      Left Foot Dermatologic   Skin  Left foot skin is intact.     TESTS     Right Foot Tests   Anterior drawer: negative     Left Foot Tests   Anterior drawer: negative    MRI Ankle Left Without Contrast    Result Date: 10/18/2024  Narrative: MRI ANKLE LEFT  WO CONTRAST Date of Exam: 10/16/2024 4:05 PM EDT Indication: Numbness and pain on left lateral ankle and  foot after spraining her ankle in July 2024.  Comparison: None available. Technique:  Routine multiplanar/multisequence images of the left ankle were obtained without contrast administration. Findings: BONES There is a small avulsion fracture at the medial malleolus as seen on coronal image 23 and sagittal image 7. This is also loss of normal morphology and increased signal of the proximal deltoid in this location. There is mild surrounding marrow and soft tissue edema signal. There is osteophyte formation at the tibiotalar, subtalar, and midfoot joints with no other definite fracture small accessory navicular is are noted. Small enthesophytes are seen at the Achilles insertion and plantar calcaneus. No significant malalignment. TENDONS The flexor, extensor, and peroneal tendons appear intact. No findings of tenosynovitis or high-grade tendon defect. No findings to suggest acute plantar fasciitis.  The Achilles tendon is intact. No findings of retrocalcaneal bursitis. MUSCLES Muscle size and signal intensity is normal. LIGAMENTS The anterior talofibular ligament is not clearly visualized and presumed to be torn. Distal syndesmosis and lateral ankle ligaments otherwise appear to be intact. There is again suspected avulsion and tear of the deltoid ligament origin. Definite acute spring ligament injury. Lisfranc ligament appears intact. CARTILAGE There is suspected diffuse cartilage thinning with mild to moderate joint space narrowing. SOFT TISSUES There is mild edema signal at the anterior lateral aspect of the ankle. OTHER INTRAARTICULAR FINDINGS No significant joint effusion. OTHER EXTRAARTICULAR FINDINGS The sinus tarsi has normal fat signal intensity.  The tarsal tunnel is normal in appearance.     Impression: Impression: 1.Small avulsion fracture at the medial malleolus with suspected tear of the deltoid ligament origin. 2.Tear of the anterior talofibular ligament. 3.Mild to moderate osteoarthritis of the  hindfoot and midfoot. Electronically Signed: Eleno Cervantes  10/18/2024 1:09 PM EDT  Workstation ID: [id number]     ASSESSMENT/PLAN     Diagnoses and all orders for this visit:    1. Non-insulin dependent type 2 diabetes mellitus (Primary)    2. Type 2 diabetes mellitus with polyneuropathy    3. Neuropathy    4. Acute left ankle pain    5. DM feet      Medications and allergies reviewed.  Reviewed available blood glucose and HgB A1C lab values along with other pertinent labs.  These were discussed with the patient as to their importance of diabetic maintenance.    Comprehensive lower extremity examination and evaluation was performed.    Discussed findings and treatment plan including risks, benefits, and treatment options with patient in detail. Patient agreed with treatment plan.    Ankle brace was applied and dispensed for as needed use of her left ankle.  The patient states understanding and agreement with this plan.    Rice Therapy: It is important to treat any injury as soon as possible to help control swelling and increase recovery time. The recognized regimen for immediate treatment of sport injuries includes rest, ice (cold application), compression, and elevation (RICE). Remove the injured athlete from play, apply ice to the affected area, wrap or compress the injured area with an elastic bandage when appropriate, and elevate the injured area above heart level to reduce swelling.  The patient is to not use ice for longer than 20 minutes at a time, with at least 20 minutes of no ice usage between applications.     Patient instructed use OTC analgesics with dosing per package insert as needed.      The patient states understanding and agreement with this plan.    An After Visit Summary was printed and given to the patient at discharge, including (if requested) any available informative/educational handouts regarding diagnosis, treatment, or medications. All questions were answered to patient/family satisfaction.  Should symptoms fail to improve or worsen they agree to call or return to clinic or to go to the Emergency Department. Discussed the importance of following up with any needed screening tests/labs/specialist appointments and any requested follow-up recommended by me today. Importance of maintaining follow-up discussed and patient accepts that missed appointments can delay diagnosis and potentially lead to worsening of conditions.    Return in about 1 year (around 10/25/2025) for DFE., or sooner if acute issues arise.    This document has been electronically signed by Dandre Rebolledo DPM on October 25, 2024 10:39 EDT

## 2024-10-30 ENCOUNTER — TREATMENT (OUTPATIENT)
Dept: PHYSICAL THERAPY | Facility: CLINIC | Age: 69
End: 2024-10-30
Payer: MEDICARE

## 2024-10-30 DIAGNOSIS — R26.2 DIFFICULTY WALKING: ICD-10-CM

## 2024-10-30 DIAGNOSIS — M25.572 LEFT ANKLE PAIN, UNSPECIFIED CHRONICITY: ICD-10-CM

## 2024-10-30 DIAGNOSIS — S93.402A SPRAIN OF LEFT ANKLE, UNSPECIFIED LIGAMENT, INITIAL ENCOUNTER: Primary | ICD-10-CM

## 2024-10-30 NOTE — PROGRESS NOTES
"Physical Therapy Daily Treatment Note  [address], [address] [place] [address]        Patient: Leilani Ordaz   : 1955  Diagnosis/ICD-10 Code:  Sprain of left ankle, unspecified ligament, initial encounter [S93.402A]  Referring practitioner: Dandre Rebolledo, *  Date of Initial Visit: Type: THERAPY  Noted: 10/16/2024  Today's Date: 10/30/2024  Patient seen for 3 sessions             Subjective   Leilani Ordaz reports having increased Left ankle pain after \"Turning\" her ankle while walking on uneven ground in her yard yesterday.  She rates her Left ankle pain at 5/10 upon arrival today.  She states that she had follow-up with DR regarding her left ankle on Friday and states that he gave her a brace to wear to support her ankle, but to only use when walking a lot or on uneven ground.    Objective     HEP Updated:  [employer] Access code:  [email or web address]  4 way ankle with resistance band  Written/Verbal instruction given      Single Leg Stance:  Left:  30 sec  Right :  30 sec  (Increased ankle strategy and weakness noted on Left)      See Exercise Logs for complete treatment.       Assessment/Plan    Pt tolerated therapy session well - with progression of therapeutic exercises, CKC-Functional activities, and Manual therapy. She has improved, but continues to have deficits in Her Left Hip and Ankle  Strength and Stability; limiting function and ability to perform ADLs without increased pain or difficulty at this time.  HEP updated with [employer] band issued this date.     Progress per Plan of Care- as tolerated.           Timed:  Manual Therapy:    0     mins  57439;  Therapeutic Exercise:    28     mins  51934;     Neuromuscular Adam:    0    mins  52606;    Therapeutic Activity:     10     mins  03669;     Gait Trainin     mins  93651;     Ultrasound:     0     mins  22059;    Electrical Stimulation:    0     mins  41108;  Iontophoresis     0     mins  55519    Untimed:  Electrical Stimulation:    0  "    mins  07978 ( );  Mechanical Traction:    00     mins  54615;   Fluidotherapy     0     mins  66355  Hot pack     0     mins  05967  Cold pack     0     mins  69081    Timed Treatment:   38   mins   Total Treatment:     38   mins        Brigette Green PTA  Physical Therapist Assistant

## 2024-11-04 ENCOUNTER — TREATMENT (OUTPATIENT)
Dept: PHYSICAL THERAPY | Facility: CLINIC | Age: 69
End: 2024-11-04
Payer: MEDICARE

## 2024-11-04 DIAGNOSIS — R26.2 DIFFICULTY WALKING: ICD-10-CM

## 2024-11-04 DIAGNOSIS — M25.572 LEFT ANKLE PAIN, UNSPECIFIED CHRONICITY: ICD-10-CM

## 2024-11-04 DIAGNOSIS — S93.402A SPRAIN OF LEFT ANKLE, UNSPECIFIED LIGAMENT, INITIAL ENCOUNTER: Primary | ICD-10-CM

## 2024-11-04 PROCEDURE — 97530 THERAPEUTIC ACTIVITIES: CPT | Performed by: PHYSICAL THERAPIST

## 2024-11-04 PROCEDURE — 97110 THERAPEUTIC EXERCISES: CPT | Performed by: PHYSICAL THERAPIST

## 2024-11-04 NOTE — PROGRESS NOTES
"Physical Therapy Daily Treatment Note  [address] [facility], [address] [place] [address]        Patient: Leilani Ordaz   : 1955  Diagnosis/ICD-10 Code:  Sprain of left ankle, unspecified ligament, initial encounter [S93.402A]  Referring practitioner: Dandre Rebolledo, *  Date of Initial Visit: Type: THERAPY  Noted: 10/16/2024  Today's Date: 2024  Patient seen for 4 sessions             Subjective     Leilani Ordaz denies having any pain upon arrival.  She does reports that her \"Numbness\" on the left side of her left foot persists, but state \"It seems a little better\" since beginning physical therapy.      Objective       See Exercise Logs for complete treatment.       Assessment/Plan    Pt tolerated therapy session well - with progression of therapeutic exercises, CKC-Functional activities, and Manual therapy. She has improved, but continues to have deficits in Her Left Hip and Ankle  Strength and Stability; limiting function and ability to perform ADLs without increased pain or difficulty at this time.       Progress per Plan of Care- as tolerated.               Timed:  Manual Therapy:    5     mins  55535;  Therapeutic Exercise:    25     mins  11882;     Neuromuscular Adam:    0    mins  63884;    Therapeutic Activity:     10     mins  94938;     Gait Trainin     mins  52542;     Ultrasound:     0     mins  05296;    Electrical Stimulation:    0     mins  01406;  Iontophoresis     0     mins  78356    Untimed:  Electrical Stimulation:    0     mins  23647 ( );  Mechanical Traction:    0     mins  93277;   Fluidotherapy     0     mins  26633  Hot pack     0     mins  87933  Cold pack     0     mins  23021    Timed Treatment:   40   mins   Total Treatment:     40   mins        Brigette Green PTA  Physical Therapist Assistant  "

## 2024-11-08 ENCOUNTER — TREATMENT (OUTPATIENT)
Dept: PHYSICAL THERAPY | Facility: CLINIC | Age: 69
End: 2024-11-08
Payer: MEDICARE

## 2024-11-08 DIAGNOSIS — M25.572 LEFT ANKLE PAIN, UNSPECIFIED CHRONICITY: ICD-10-CM

## 2024-11-08 DIAGNOSIS — R26.2 DIFFICULTY WALKING: ICD-10-CM

## 2024-11-08 DIAGNOSIS — S93.402A SPRAIN OF LEFT ANKLE, UNSPECIFIED LIGAMENT, INITIAL ENCOUNTER: Primary | ICD-10-CM

## 2024-11-08 NOTE — PROGRESS NOTES
"Physical Therapy Daily Treatment Note  [address] [facility] [address], [address] [place] [address]        Patient: Leilani Ordaz   : 1955  Diagnosis/ICD-10 Code:  Sprain of left ankle, unspecified ligament, initial encounter [S93.402A]  Referring practitioner: Dandre Rebolledo, *  Date of Initial Visit: Type: THERAPY  Noted: 10/16/2024  Today's Date: 2024  Patient seen for 5 sessions             Subjective   Leilani Ordaz reports feeling better overall.  She reports having less \"Numbness\" in her left foot.  She states that it is now only on the 4th and 5th toe.  She states that previously it was in her entire- outside of her left foot.  She reports that she was able to go bowling and did well without any increase in pain or numbness noted.  She rates her left lateral ankle - foot pain at 2/10 upon arrival today.    Objective       See Exercise and Manual Logs for complete treatment.       Assessment/Plan    Pt tolerated therapy session well - with progression of therapeutic exercises, CKC-Functional activities, and Manual therapy. She has improved, but continues to have deficits in Her Left Hip and Ankle  Strength and Stability; limiting function and ability to perform ADLs without increased pain or difficulty at this time.  Pt continues to report good response and carryover post therapy sessions- reporting decrease in pain and \"Numbness\" in left lateral foot.       Progress per Plan of Care- as tolerated.               Timed:  Manual Therapy:    8     mins  49636;  Therapeutic Exercise:    22     mins  77255;     Neuromuscular Adam:    0    mins  33302;    Therapeutic Activity:     10     mins  80392;     Gait Trainin     mins  02760;     Ultrasound:     0     mins  48667;    Electrical Stimulation:    0     mins  22439;  Iontophoresis     0     mins  45653    Untimed:  Electrical Stimulation:    0     mins  31147 ( );  Mechanical Traction:    0     mins  58169;   Fluidotherapy     0     mins  " 65013  Hot pack     0     mins  37509  Cold pack     0     mins  92236    Timed Treatment:   40   mins   Total Treatment:     40   mins        Brigette Green PTA  Physical Therapist Assistant

## 2024-11-18 ENCOUNTER — TREATMENT (OUTPATIENT)
Dept: PHYSICAL THERAPY | Facility: CLINIC | Age: 69
End: 2024-11-18
Payer: MEDICARE

## 2024-11-18 DIAGNOSIS — R26.2 DIFFICULTY WALKING: ICD-10-CM

## 2024-11-18 DIAGNOSIS — M25.572 LEFT ANKLE PAIN, UNSPECIFIED CHRONICITY: ICD-10-CM

## 2024-11-18 DIAGNOSIS — S93.402A SPRAIN OF LEFT ANKLE, UNSPECIFIED LIGAMENT, INITIAL ENCOUNTER: Primary | ICD-10-CM

## 2024-11-18 PROCEDURE — 97140 MANUAL THERAPY 1/> REGIONS: CPT | Performed by: PHYSICAL THERAPIST

## 2024-11-18 PROCEDURE — 97164 PT RE-EVAL EST PLAN CARE: CPT | Performed by: PHYSICAL THERAPIST

## 2024-11-18 PROCEDURE — 97110 THERAPEUTIC EXERCISES: CPT | Performed by: PHYSICAL THERAPIST

## 2024-11-18 PROCEDURE — 97112 NEUROMUSCULAR REEDUCATION: CPT | Performed by: PHYSICAL THERAPIST

## 2024-11-18 NOTE — PROGRESS NOTES
Re-Assessment / Re-Certification  [address] [facility], [address] [place] [address]      Patient: Leilani Ordaz   : 1955  Diagnosis/ICD-10 Code:  Sprain of left ankle, unspecified ligament, initial encounter [S93.402A]  Referring practitioner: Dandre Rebolledo, *  Date of Initial Visit: Type: THERAPY  Noted: 10/16/2024  Today's Date: 2024  Patient seen for 6 sessions    Progress note due: 2024  Re-cert due: 2025      Subjective:   Leilani Ordaz reports: no pain today.  Pt states that she has noticed improvements in overall pain/function compared with initial evaluation.  Pt states that she is no longer using the ankle brace.  Pt has no difficulty with prolonged walking, standing, stairs or other ADLs.  Pt reports that she continues to have mild numbness in lateral L foot.  Subjective Questionnaire: LEFS: 72  Clinical Progress: improved  Home Program Compliance: Yes    Subjective   Objective   Active Range of Motion   Left Ankle/Foot   Dorsiflexion (ke): WFL  Plantar flexion: WFL  Inversion: WFL  Eversion: WFL     Strength/Myotome Testing        Left Ankle/Foot   Dorsiflexion: 5  Plantar flexion: 5  Inversion: 5  Eversion: 5       Muscle Activation      Additional Muscle Activation Details  good PPT/TA activation  Assessment & Plan       Assessment  Assessment details: Pt demonstrates improvements in L ankle strength/ROM and reports no pain or difficulty during ADLs.  Due to improvements in pain/function and meeting all PT goals the pt will be discharged from PT today.  Pt is comfortable with this plan and was educated on further HEP.  Pt re-evaluation completed today due to early discharge.    Goals  Plan Goals:  1. The patient has limited ROM for the L ankle.              LTG 1: 12 weeks:  In order to allow the patient greater ease with forward, lateral, and diagonal mobility the patient will demonstrate improved ROM of the L ankle as follows:  10 degrees of dorsiflexion and 15 degrees  of eversion.  STATUS:  met                2. The patient has limited strength of the L ankle.              LTG 2: 12 weeks:  In order to provide greater joint stability of the L ankle the patient will demonstrate improved strength of the L ankle as follows:  4+/5 plantar flexion.                          STATUS:  met              STG 2a: 6 weeks:  The patient will demonstrate improved strength of the L ankle as follows:  4/5 plantar flexion.                          STATUS:  met     3. The patient has gait dysfunction.              LTG 3: 12 weeks:  The patient will ambulate without assistive device, independently, for community distances with minimal limp to the L lower extremity in order to improve mobility and allow patient to perform activities such as grocery shopping with greater ease.                          STATUS:  met              STG 3a: The patient will be independent in HEP.                          STATUS:  New     4. The patient complains of L ankle pain.  LTG 4: 12 weeks:  The patient will report a pain rating of 1/10 or better in order to improve  tolerance to activities of daily living and improve sleep quality.  STATUS:  met  STG 4a: 6 weeks:  The patient will report a pain rating of 3/10 or better.  STATUS:  met     5. Mobility: Walking/Moving Around Functional Limitation                               LTG 5: 12 weeks:  The patient will demonstrate 12% limitation by achieving a score of 70 on the Lower Extremity Functional Scale.                          STATUS:  met              STG 5a: 6 weeks:  The patient will demonstrate 25% limitation by achieving a score of 60 on the Lower Extremity Functional Scale.                            STATUS:  met           Progress toward previous goals: All Met      Recommendations: Discharge    PT SIGNATURE: Rachel Hicks PT     Electronically signed 11/18/2024  DATE TREATMENT INITIATED: 11/18/2024  [place] License: [id number]     Certification Period: 11/18/2024 thru  2/15/2025    Based upon review of the patient's progress and continued therapy plan, it is my medical opinion that Leilani Ordaz should continue physical therapy treatment at [facility]  [address] [place] [id number] [phone].    Signature: __________________________________  Dandre Rebolledo DPM  NPI: [id number]    Timed:  Manual Therapy:    8     mins  21421;  Therapeutic Exercise:    22     mins  29737;     Neuromuscular Adam:    8    mins  03566;    Therapeutic Activity:     0     mins  19717;     Gait Trainin     mins  42718;     Ultrasound:     0     mins  05498;    Electrical Stimulation:    0     mins  70802 ( );    Untimed:  Electrical Stimulation:    0     mins  93434 ( );  Re-evaluation :    7     mins  88367;     Timed Treatment:   38   mins   Total Treatment:     45   mins

## 2024-11-26 ENCOUNTER — LAB (OUTPATIENT)
Facility: HOSPITAL | Age: 69
End: 2024-11-26
Payer: MEDICARE

## 2024-11-26 DIAGNOSIS — E11.65 TYPE 2 DIABETES MELLITUS WITH HYPERGLYCEMIA, WITHOUT LONG-TERM CURRENT USE OF INSULIN: ICD-10-CM

## 2024-11-26 DIAGNOSIS — E55.9 VITAMIN D DEFICIENCY: ICD-10-CM

## 2024-11-26 DIAGNOSIS — E78.2 MIXED HYPERLIPIDEMIA: ICD-10-CM

## 2024-11-26 DIAGNOSIS — E89.0 POSTOPERATIVE HYPOTHYROIDISM: ICD-10-CM

## 2024-11-26 LAB
25(OH)D3 SERPL-MCNC: 51.4 NG/ML (ref 30–100)
ALBUMIN SERPL-MCNC: 4 G/DL (ref 3.5–5.2)
ALBUMIN/GLOB SERPL: 1.3 G/DL
ALP SERPL-CCNC: 69 U/L (ref 39–117)
ALT SERPL W P-5'-P-CCNC: 12 U/L (ref 1–33)
ANION GAP SERPL CALCULATED.3IONS-SCNC: 12 MMOL/L (ref 5–15)
AST SERPL-CCNC: 17 U/L (ref 1–32)
BACTERIA UR QL AUTO: NORMAL /HPF
BASOPHILS # BLD AUTO: 0.06 10*3/MM3 (ref 0–0.2)
BASOPHILS NFR BLD AUTO: 0.6 % (ref 0–1.5)
BILIRUB SERPL-MCNC: 0.4 MG/DL (ref 0–1.2)
BILIRUB UR QL STRIP: NEGATIVE
BUN SERPL-MCNC: 17 MG/DL (ref 8–23)
BUN/CREAT SERPL: 15.3 (ref 7–25)
CALCIUM SPEC-SCNC: 9.9 MG/DL (ref 8.6–10.5)
CHLORIDE SERPL-SCNC: 106 MMOL/L (ref 98–107)
CHOLEST SERPL-MCNC: 129 MG/DL (ref 0–200)
CLARITY UR: CLEAR
CO2 SERPL-SCNC: 25 MMOL/L (ref 22–29)
COLOR UR: YELLOW
CREAT SERPL-MCNC: 1.11 MG/DL (ref 0.57–1)
DEPRECATED RDW RBC AUTO: 39.7 FL (ref 37–54)
EGFRCR SERPLBLD CKD-EPI 2021: 53.9 ML/MIN/1.73
EOSINOPHIL # BLD AUTO: 0.01 10*3/MM3 (ref 0–0.4)
EOSINOPHIL NFR BLD AUTO: 0.1 % (ref 0.3–6.2)
ERYTHROCYTE [DISTWIDTH] IN BLOOD BY AUTOMATED COUNT: 12.6 % (ref 12.3–15.4)
GLOBULIN UR ELPH-MCNC: 3 GM/DL
GLUCOSE SERPL-MCNC: 99 MG/DL (ref 65–99)
GLUCOSE UR STRIP-MCNC: ABNORMAL MG/DL
HBA1C MFR BLD: 6.2 % (ref 4.8–5.6)
HCT VFR BLD AUTO: 41.2 % (ref 34–46.6)
HDLC SERPL-MCNC: 34 MG/DL (ref 40–60)
HGB BLD-MCNC: 13.8 G/DL (ref 12–15.9)
HGB UR QL STRIP.AUTO: NEGATIVE
HYALINE CASTS UR QL AUTO: NORMAL /LPF
IMM GRANULOCYTES # BLD AUTO: 0.02 10*3/MM3 (ref 0–0.05)
IMM GRANULOCYTES NFR BLD AUTO: 0.2 % (ref 0–0.5)
KETONES UR QL STRIP: NEGATIVE
LDLC SERPL CALC-MCNC: 76 MG/DL (ref 0–100)
LDLC/HDLC SERPL: 2.22 {RATIO}
LEUKOCYTE ESTERASE UR QL STRIP.AUTO: NEGATIVE
LYMPHOCYTES # BLD AUTO: 3.22 10*3/MM3 (ref 0.7–3.1)
LYMPHOCYTES NFR BLD AUTO: 33.9 % (ref 19.6–45.3)
MCH RBC QN AUTO: 29 PG (ref 26.6–33)
MCHC RBC AUTO-ENTMCNC: 33.5 G/DL (ref 31.5–35.7)
MCV RBC AUTO: 86.6 FL (ref 79–97)
MONOCYTES # BLD AUTO: 0.72 10*3/MM3 (ref 0.1–0.9)
MONOCYTES NFR BLD AUTO: 7.6 % (ref 5–12)
NEUTROPHILS NFR BLD AUTO: 5.46 10*3/MM3 (ref 1.7–7)
NEUTROPHILS NFR BLD AUTO: 57.6 % (ref 42.7–76)
NITRITE UR QL STRIP: NEGATIVE
NRBC BLD AUTO-RTO: 0 /100 WBC (ref 0–0.2)
PH UR STRIP.AUTO: 5.5 [PH] (ref 5–8)
PLATELET # BLD AUTO: 303 10*3/MM3 (ref 140–450)
PMV BLD AUTO: 11 FL (ref 6–12)
POTASSIUM SERPL-SCNC: 4.3 MMOL/L (ref 3.5–5.2)
PROT SERPL-MCNC: 7 G/DL (ref 6–8.5)
PROT UR QL STRIP: NEGATIVE
RBC # BLD AUTO: 4.76 10*6/MM3 (ref 3.77–5.28)
RBC # UR STRIP: NORMAL /HPF
REF LAB TEST METHOD: NORMAL
SODIUM SERPL-SCNC: 143 MMOL/L (ref 136–145)
SP GR UR STRIP: 1.03 (ref 1–1.03)
SQUAMOUS #/AREA URNS HPF: NORMAL /HPF
T4 FREE SERPL-MCNC: 1.5 NG/DL (ref 0.92–1.68)
TRIGL SERPL-MCNC: 98 MG/DL (ref 0–150)
TSH SERPL DL<=0.05 MIU/L-ACNC: 2.35 UIU/ML (ref 0.27–4.2)
UROBILINOGEN UR QL STRIP: ABNORMAL
VLDLC SERPL-MCNC: 19 MG/DL (ref 5–40)
WBC # UR STRIP: NORMAL /HPF
WBC NRBC COR # BLD AUTO: 9.49 10*3/MM3 (ref 3.4–10.8)

## 2024-11-26 PROCEDURE — 83036 HEMOGLOBIN GLYCOSYLATED A1C: CPT

## 2024-11-26 PROCEDURE — 84443 ASSAY THYROID STIM HORMONE: CPT

## 2024-11-26 PROCEDURE — 80061 LIPID PANEL: CPT

## 2024-11-26 PROCEDURE — 81001 URINALYSIS AUTO W/SCOPE: CPT

## 2024-11-26 PROCEDURE — 84439 ASSAY OF FREE THYROXINE: CPT

## 2024-11-26 PROCEDURE — 82306 VITAMIN D 25 HYDROXY: CPT

## 2024-11-26 PROCEDURE — 80053 COMPREHEN METABOLIC PANEL: CPT

## 2024-11-26 PROCEDURE — 85025 COMPLETE CBC W/AUTO DIFF WBC: CPT

## 2024-12-02 ENCOUNTER — OFFICE VISIT (OUTPATIENT)
Dept: FAMILY MEDICINE CLINIC | Facility: CLINIC | Age: 69
End: 2024-12-02
Payer: MEDICARE

## 2024-12-02 VITALS
BODY MASS INDEX: 26.51 KG/M2 | SYSTOLIC BLOOD PRESSURE: 130 MMHG | HEIGHT: 65 IN | DIASTOLIC BLOOD PRESSURE: 82 MMHG | HEART RATE: 68 BPM | OXYGEN SATURATION: 98 % | TEMPERATURE: 98.3 F | WEIGHT: 159.1 LBS

## 2024-12-02 DIAGNOSIS — E78.2 MIXED HYPERLIPIDEMIA: ICD-10-CM

## 2024-12-02 DIAGNOSIS — E89.0 POSTOPERATIVE HYPOTHYROIDISM: ICD-10-CM

## 2024-12-02 DIAGNOSIS — M25.572 CHRONIC PAIN OF LEFT ANKLE: ICD-10-CM

## 2024-12-02 DIAGNOSIS — M54.42 CHRONIC BILATERAL LOW BACK PAIN WITH LEFT-SIDED SCIATICA: ICD-10-CM

## 2024-12-02 DIAGNOSIS — E55.9 VITAMIN D DEFICIENCY: ICD-10-CM

## 2024-12-02 DIAGNOSIS — Z51.81 MEDICATION MONITORING ENCOUNTER: ICD-10-CM

## 2024-12-02 DIAGNOSIS — D17.71 ANGIOMYOLIPOMA OF RIGHT KIDNEY: ICD-10-CM

## 2024-12-02 DIAGNOSIS — G89.29 CHRONIC BILATERAL LOW BACK PAIN WITH LEFT-SIDED SCIATICA: ICD-10-CM

## 2024-12-02 DIAGNOSIS — N18.31 STAGE 3A CHRONIC KIDNEY DISEASE: Primary | ICD-10-CM

## 2024-12-02 DIAGNOSIS — E11.65 TYPE 2 DIABETES MELLITUS WITH HYPERGLYCEMIA, WITHOUT LONG-TERM CURRENT USE OF INSULIN: ICD-10-CM

## 2024-12-02 DIAGNOSIS — I10 ESSENTIAL HYPERTENSION: ICD-10-CM

## 2024-12-02 DIAGNOSIS — G89.29 CHRONIC PAIN OF LEFT ANKLE: ICD-10-CM

## 2024-12-02 PROCEDURE — 3079F DIAST BP 80-89 MM HG: CPT | Performed by: FAMILY MEDICINE

## 2024-12-02 PROCEDURE — 99214 OFFICE O/P EST MOD 30 MIN: CPT | Performed by: FAMILY MEDICINE

## 2024-12-02 PROCEDURE — 3044F HG A1C LEVEL LT 7.0%: CPT | Performed by: FAMILY MEDICINE

## 2024-12-02 PROCEDURE — 3075F SYST BP GE 130 - 139MM HG: CPT | Performed by: FAMILY MEDICINE

## 2024-12-02 PROCEDURE — 1125F AMNT PAIN NOTED PAIN PRSNT: CPT | Performed by: FAMILY MEDICINE

## 2024-12-02 NOTE — PROGRESS NOTES
Chief Complaint  diabetes    Subjective          Leilani Ordaz presents to [facility]    Diabetes         History of Present Illness  The patient is a 69-year-old female who presents today for a 3-month follow-up.    She is currently on Jardiance, which incurs a monthly cost of $40. She has been on Jardiance for approximately 9 months. Although she has not yet renewed her insurance, she anticipates that it will cover the cost of Jardiance this year. Her blood pressure has been stable, with a reading of 111/79 yesterday.    She is also taking a combination of calcium and vitamin D supplements.    She underwent a left thyroidectomy and has since recovered well.    She had a small avulsion fracture of the medial malleolus with a suspected tear of the deltoid ligament origin. She has undergone physical therapy and still experiences slight tingling in the area. The physical therapist believes this tingling is related to her back issues from a deteriorating disc.         Current Outpatient Medications   Medication Instructions    albuterol sulfate  (90 Base) MCG/ACT inhaler 2 puffs, Every 4 Hours PRN    Aspirin Low Dose 81 mg, Oral, Daily    atorvastatin (LIPITOR) 80 mg, Oral, Daily    calcium carbonate (OS-DIANE) 600 mg, Daily    cholecalciferol (VITAMIN D3) 1,000 Units, Daily    Diclofenac Sodium (VOLTAREN) 4 g, Topical, 4 Times Daily PRN    empagliflozin (JARDIANCE) 10 mg, Oral, Daily    fish oil 1,000 mg, 2 Times Daily With Meals    fluticasone (FLONASE) 50 MCG/ACT nasal spray 2 sprays, Nasal, Daily    HYDROcodone-acetaminophen (NORCO) 7.5-325 MG per tablet 1 tablet, Oral, Every 4 Hours PRN    levothyroxine (SYNTHROID) 50 mcg, Oral, Daily    lisinopril (PRINIVIL,ZESTRIL) 20 mg, Oral, Daily    loratadine (CLARITIN) 10 mg, Oral, Daily    metFORMIN (GLUCOPHAGE) 1,000 mg, Oral, 2 Times Daily    montelukast (SINGULAIR) 10 mg, Oral, Nightly       The following portions of the  "patient's history were reviewed and updated as appropriate: allergies, current medications, past family history, past medical history, past social history, past surgical history, and problem list.    Objective   Vital Signs:   /82   Pulse 68   Temp 98.3 °F (36.8 °C) (Oral)   Ht 165.1 cm (65\")   Wt 72.2 kg (159 lb 1.6 oz)   SpO2 98%   BMI 26.48 kg/m²     BP Readings from Last 3 Encounters:   12/02/24 130/82   10/25/24 129/77   10/07/24 142/82     Wt Readings from Last 3 Encounters:   12/02/24 72.2 kg (159 lb 1.6 oz)   10/25/24 68.9 kg (152 lb)   10/07/24 68.9 kg (152 lb)           Physical Exam  Vitals reviewed.   Constitutional:       Appearance: Normal appearance.   HENT:      Head: Normocephalic and atraumatic.      Right Ear: External ear normal.      Left Ear: External ear normal.      Nose: Nose normal.   Eyes:      Conjunctiva/sclera: Conjunctivae normal.   Cardiovascular:      Rate and Rhythm: Normal rate and regular rhythm.      Heart sounds: No murmur heard.     No friction rub. No gallop.   Pulmonary:      Effort: Pulmonary effort is normal.      Breath sounds: Normal breath sounds. No wheezing or rhonchi.   Abdominal:      General: Bowel sounds are normal. There is no distension.      Palpations: Abdomen is soft.      Tenderness: There is no abdominal tenderness.   Skin:     General: Skin is warm and dry.   Neurological:      Mental Status: She is alert and oriented to person, place, and time.      Cranial Nerves: No cranial nerve deficit.   Psychiatric:         Mood and Affect: Mood and affect normal.         Behavior: Behavior normal.         Thought Content: Thought content normal.         Judgment: Judgment normal.            Result Review :   The following data was reviewed by: Js Tavares DO on 12/02/2024:  Common labs          4/17/2024    13:13 8/15/2024    09:26 11/26/2024    08:58   Common Labs   Glucose 128   99    BUN 15   17    Creatinine 1.22  1.20  1.11    Sodium 138   143 "    Potassium 4.4   4.3    Chloride 103   106    Calcium 10.3   9.9    Albumin 4.2   4.0    Total Bilirubin 0.3   0.4    Alkaline Phosphatase 67   69    AST (SGOT) 17   17    ALT (SGPT) 13   12    WBC 11.91   9.49    Hemoglobin 12.2   13.8    Hematocrit 37.1   41.2    Platelets 278   303    Total Cholesterol 111   129    Triglycerides 138   98    HDL Cholesterol 35   34    LDL Cholesterol  52   76    Hemoglobin A1C 6.60   6.20             Lab Results   Component Value Date    BILIRUBINUR Negative 11/26/2024       Results  Laboratory Studies  Blood counts are normal, no anemia, normal white blood cell count and platelets. Kidney function slightly off but improved compared to previous tests. Urinalysis showed no bacteria or urinary tract infection, no protein detected. Thyroid levels (TSH and free T4) are normal. Vitamin D level is 51.4. Cholesterol levels are good, HDL slightly low. A1c is 6.2.    Imaging  Mammogram showed no concerning findings, BI-RADS 2, benign. Ultrasound of kidneys showed a small round echogenic nonshadowing area of the right kidney, possibly an angiomyolipoma.    Procedures        Assessment and Plan    Diagnoses and all orders for this visit:    1. Stage 3a chronic kidney disease (Primary)  -     US Renal Bilateral; Future    2. Postoperative hypothyroidism  -     TSH+Free T4; Future    3. Vitamin D deficiency  -     Vitamin D,25-Hydroxy; Future    4. Medication monitoring encounter  -     CBC & Differential; Future  -     Comprehensive Metabolic Panel; Future    5. Type 2 diabetes mellitus with hyperglycemia, without long-term current use of insulin  -     CBC & Differential; Future  -     Comprehensive Metabolic Panel; Future  -     Hemoglobin A1c; Future  -     Microalbumin / Creatinine Urine Ratio - Urine, Clean Catch; Future    6. Mixed hyperlipidemia  -     Lipid Panel; Future    7. Essential hypertension    8. Chronic pain of left ankle    9. Chronic bilateral low back pain with  left-sided sciatica    10. Angiomyolipoma of right kidney  -     US Renal Bilateral; Future    Other orders  -     empagliflozin (Jardiance) 10 MG tablet tablet; Take 1 tablet by mouth Daily.  Dispense: 90 tablet; Refill: 3        Assessment & Plan  1. Chronic Kidney Disease.  Kidney function shows slight improvement compared to previous results. Urinalysis revealed no signs of bacterial infection or urinary tract infection, and no proteinuria was detected. She will continue her current regimen of Jardiance 10 mg daily. A repeat ultrasound of the kidneys will be scheduled in a year to monitor the small round echogenic nonshadowing area in the right kidney, which could represent an angiomyolipoma or simply some fat.    2. Hypothyroidism.  Thyroid function tests, including TSH and free T4, are within normal range. She will continue her current regimen of levothyroxine 50 mcg daily.    3. Hyperlipidemia.  Lipid profile is generally good, although HDL is slightly low. She will continue her current regimen of atorvastatin 80 mg daily. Increasing exercise is recommended to improve HDL levels.    4. Hypertension.  Blood pressure is well-controlled with a reading of 130/82 today. She will continue her current regimen of lisinopril 20 mg daily.    5. Type 2 Diabetes Mellitus.  Hemoglobin A1c is 6.2, indicating good glycemic control. She will continue her current regimen of metformin 1000 mg twice daily and Jardiance 10 mg daily. A 90-day supply of Jardiance will be provided to reduce costs.    6. Health Maintenance.  Mammogram results were normal, with a BI-RADS 2 classification, suggesting benign findings. An influenza vaccine was offered but declined. Lab work, including CBC, CMP, A1c, lipid profile, TSH, microalbumin, and vitamin D, will be ordered.    Follow-up  Return in 6 months for follow up.       Medications Discontinued During This Encounter   Medication Reason    Jardiance 10 MG tablet tablet Reorder           Follow Up   Return in about 6 months (around 6/2/2025) for Hypertension.  Patient was given instructions and counseling regarding her condition or for health maintenance advice. Please see specific information pulled into the AVS if appropriate.     Patient or patient representative verbalized consent for the use of Ambient Listening during the visit with  Js Tavares DO for chart documentation. 12/2/2024  15:37 EST    Js Tavares DO  12/02/24  15:56 EST

## 2024-12-04 ENCOUNTER — TELEPHONE (OUTPATIENT)
Dept: ORTHOPEDIC SURGERY | Facility: CLINIC | Age: 69
End: 2024-12-04
Payer: MEDICARE

## 2024-12-04 NOTE — TELEPHONE ENCOUNTER
Left hip pain.[facility] XRAY IN CHART NO SX NO WC NO MVA PLEASE ADVISE PT CAN BE REACHED [phone]

## 2024-12-06 RX ORDER — ALBUTEROL SULFATE 90 UG/1
2 INHALANT RESPIRATORY (INHALATION) EVERY 4 HOURS PRN
Qty: 8.5 G | Refills: 1 | Status: SHIPPED | OUTPATIENT
Start: 2024-12-06

## 2024-12-11 ENCOUNTER — OFFICE VISIT (OUTPATIENT)
Dept: ORTHOPEDIC SURGERY | Facility: CLINIC | Age: 69
End: 2024-12-11
Payer: MEDICARE

## 2024-12-11 VITALS
HEART RATE: 77 BPM | WEIGHT: 157 LBS | OXYGEN SATURATION: 98 % | DIASTOLIC BLOOD PRESSURE: 84 MMHG | HEIGHT: 65 IN | BODY MASS INDEX: 26.16 KG/M2 | SYSTOLIC BLOOD PRESSURE: 143 MMHG

## 2024-12-11 DIAGNOSIS — M70.62 TROCHANTERIC BURSITIS OF LEFT HIP: Primary | ICD-10-CM

## 2024-12-11 RX ORDER — LIDOCAINE HYDROCHLORIDE 10 MG/ML
5 INJECTION, SOLUTION INFILTRATION; PERINEURAL
Status: COMPLETED | OUTPATIENT
Start: 2024-12-11 | End: 2024-12-11

## 2024-12-11 RX ORDER — TRIAMCINOLONE ACETONIDE 40 MG/ML
40 INJECTION, SUSPENSION INTRA-ARTICULAR; INTRAMUSCULAR
Status: COMPLETED | OUTPATIENT
Start: 2024-12-11 | End: 2024-12-11

## 2024-12-11 RX ADMIN — LIDOCAINE HYDROCHLORIDE 5 ML: 10 INJECTION, SOLUTION INFILTRATION; PERINEURAL at 09:33

## 2024-12-11 RX ADMIN — TRIAMCINOLONE ACETONIDE 40 MG: 40 INJECTION, SUSPENSION INTRA-ARTICULAR; INTRAMUSCULAR at 09:33

## 2024-12-11 NOTE — PROGRESS NOTES
"Chief Complaint  Initial Evaluation and Pain of the Left Hip    Subjective          Leilani Ordaz presents to [facility] for an evaluation  of her left hip.     History of Present Illness    The patient presents here today for an evaluation  of her left hip. She reports she was bowling when she felt a snap to her left hip. She reports the pain radiates down her leg and is tender to the lateral hip. She is ambulating today with a cane. She denies any prior surgery to her left hip. She went to her family doctor recently where she had x-rays done.     No Known Allergies     Social History     Socioeconomic History    Marital status:    Tobacco Use    Smoking status: Never     Passive exposure: Past    Smokeless tobacco: Never   Vaping Use    Vaping status: Never Used   Substance and Sexual Activity    Alcohol use: Not Currently     Comment: LIQUOR BUT VARY RARELY    Drug use: Not Currently    Sexual activity: Yes     Partners: Male     Birth control/protection: None        I reviewed the patient's chief complaint, history of present illness, review of systems, past medical history, surgical history, family history, social history, medications, and allergy list.     REVIEW OF SYSTEMS    Constitutional: Denies fevers, chills, weight loss  Cardiovascular: Denies chest pain, shortness of breath  Skin: Denies rashes, acute skin changes  Neurologic: Denies headache, loss of consciousness  MSK: Left hip pain       Objective   Vital Signs:   /84   Pulse 77   Ht 165.1 cm (65\")   Wt 71.2 kg (157 lb)   SpO2 98%   BMI 26.13 kg/m²     Body mass index is 26.13 kg/m².    Physical Exam    General: Alert. No acute distress.   Left lower extremity: Passive hip range of motion, no pain with hip flexion and extension, 5/5 hip flexion and abduction, tender to palpation to the lateral hip, distal neurovascularly intact, calf soft, positive  pulses, positive EHL, FHL, GS, and TA. Sensation " intact to all 5 nerves of the foot.     Large Joint Arthrocentesis: L greater trochanteric bursa  Date/Time: 12/11/2024 9:33 AM  Consent given by: patient  Site marked: site marked  Timeout: Immediately prior to procedure a time out was called to verify the correct patient, procedure, equipment, support staff and site/side marked as required   Supporting Documentation  Indications: pain   Procedure Details  Location: hip - L greater trochanteric bursa  Needle gauge: 21 G.  Medications administered: 5 mL lidocaine 1 %; 40 mg triamcinolone acetonide 40 MG/ML  Patient tolerance: patient tolerated the procedure well with no immediate complications    This injection documentation was Scribed for Brendan Montana MD by Suzette Reynaga.  12/11/24   09:34 EST    Imaging Results (Most Recent)       None                     Assessment and Plan        XR Hip With or Without Pelvis 2 - 3 View Left    Result Date: 12/4/2024  Narrative: XR HIP W OR WO PELVIS 2-3 VIEW LEFT Date of Exam: 12/4/2024 9:32 AM EST Indication: left hip pain after bowling last night, felt a snap/pop Comparison: None available. Findings: AP pelvis, AP and frog-lateral views of the left hip were obtained. No fracture or dislocation. The joint space is maintained. Calcified fibroid in the pelvis.     Impression: Impression: No significant radiographic abnormality of the left hip. Electronically Signed: Destiny Payne MD  12/4/2024 9:42 AM EST  Workstation ID: [id number]      Diagnoses and all orders for this visit:    1. Trochanteric bursitis of left hip (Primary)        The patient presents here today for an evaluation  of her left hip.     Discussed the risks and benefits of a left hip steroid injection today in the office. Patient expressed understanding and wishes to proceed. She tolerated the injection well and without any complications.     Home exercises given today and will continue current medications.     Call or return if worsening symptoms.    Scribed  for Brendan Montana MD by Natividad Guzman  12/11/2024   09:15 EST         Follow Up       PRN     Patient was given instructions and counseling regarding her condition or for health maintenance advice. Please see specific information pulled into the AVS if appropriate.     I have personally performed the services described in this document as scribed by the above individual and it is both accurate and complete. Brendan Montana MD 12/11/24 09:37 EST

## 2025-01-14 ENCOUNTER — OFFICE VISIT (OUTPATIENT)
Dept: FAMILY MEDICINE CLINIC | Facility: CLINIC | Age: 70
End: 2025-01-14
Payer: MEDICARE

## 2025-01-14 VITALS
SYSTOLIC BLOOD PRESSURE: 120 MMHG | HEIGHT: 65 IN | HEART RATE: 71 BPM | WEIGHT: 157.7 LBS | BODY MASS INDEX: 26.27 KG/M2 | OXYGEN SATURATION: 98 % | DIASTOLIC BLOOD PRESSURE: 78 MMHG | TEMPERATURE: 98 F

## 2025-01-14 DIAGNOSIS — K80.20 CALCULUS OF GALLBLADDER WITHOUT CHOLECYSTITIS WITHOUT OBSTRUCTION: ICD-10-CM

## 2025-01-14 DIAGNOSIS — R10.84 GENERALIZED ABDOMINAL PAIN: ICD-10-CM

## 2025-01-14 DIAGNOSIS — E89.0 HISTORY OF PARTIAL THYROIDECTOMY: ICD-10-CM

## 2025-01-14 DIAGNOSIS — K57.92 DIVERTICULITIS: Primary | ICD-10-CM

## 2025-01-14 DIAGNOSIS — D21.9 FIBROID: ICD-10-CM

## 2025-01-14 DIAGNOSIS — M70.62 GREATER TROCHANTERIC BURSITIS OF LEFT HIP: ICD-10-CM

## 2025-01-14 DIAGNOSIS — E11.65 TYPE 2 DIABETES MELLITUS WITH HYPERGLYCEMIA, WITHOUT LONG-TERM CURRENT USE OF INSULIN: ICD-10-CM

## 2025-01-14 DIAGNOSIS — E55.9 VITAMIN D DEFICIENCY: ICD-10-CM

## 2025-01-14 LAB
ALBUMIN SERPL-MCNC: 4.2 G/DL (ref 3.5–5.2)
ALBUMIN/GLOB SERPL: 1.3 G/DL
ALP SERPL-CCNC: 77 U/L (ref 39–117)
ALT SERPL W P-5'-P-CCNC: 17 U/L (ref 1–33)
ANION GAP SERPL CALCULATED.3IONS-SCNC: 12.3 MMOL/L (ref 5–15)
AST SERPL-CCNC: 24 U/L (ref 1–32)
BASOPHILS # BLD AUTO: 0.06 10*3/MM3 (ref 0–0.2)
BASOPHILS NFR BLD AUTO: 0.6 % (ref 0–1.5)
BILIRUB SERPL-MCNC: 0.5 MG/DL (ref 0–1.2)
BILIRUB UR QL STRIP: NEGATIVE
BUN SERPL-MCNC: 12 MG/DL (ref 8–23)
BUN/CREAT SERPL: 10.3 (ref 7–25)
CALCIUM SPEC-SCNC: 9.9 MG/DL (ref 8.6–10.5)
CHLORIDE SERPL-SCNC: 102 MMOL/L (ref 98–107)
CLARITY UR: CLEAR
CO2 SERPL-SCNC: 23.7 MMOL/L (ref 22–29)
COLOR UR: YELLOW
CREAT SERPL-MCNC: 1.16 MG/DL (ref 0.57–1)
DEPRECATED RDW RBC AUTO: 40.1 FL (ref 37–54)
EGFRCR SERPLBLD CKD-EPI 2021: 51.1 ML/MIN/1.73
EOSINOPHIL # BLD AUTO: 0.01 10*3/MM3 (ref 0–0.4)
EOSINOPHIL NFR BLD AUTO: 0.1 % (ref 0.3–6.2)
ERYTHROCYTE [DISTWIDTH] IN BLOOD BY AUTOMATED COUNT: 12.8 % (ref 12.3–15.4)
GLOBULIN UR ELPH-MCNC: 3.2 GM/DL
GLUCOSE SERPL-MCNC: 169 MG/DL (ref 65–99)
GLUCOSE UR STRIP-MCNC: ABNORMAL MG/DL
HCT VFR BLD AUTO: 44.3 % (ref 34–46.6)
HGB BLD-MCNC: 14.6 G/DL (ref 12–15.9)
HGB UR QL STRIP.AUTO: NEGATIVE
HOLD SPECIMEN: NORMAL
IMM GRANULOCYTES # BLD AUTO: 0.04 10*3/MM3 (ref 0–0.05)
IMM GRANULOCYTES NFR BLD AUTO: 0.4 % (ref 0–0.5)
KETONES UR QL STRIP: NEGATIVE
LEUKOCYTE ESTERASE UR QL STRIP.AUTO: ABNORMAL
LYMPHOCYTES # BLD AUTO: 4.03 10*3/MM3 (ref 0.7–3.1)
LYMPHOCYTES NFR BLD AUTO: 37 % (ref 19.6–45.3)
MCH RBC QN AUTO: 28.8 PG (ref 26.6–33)
MCHC RBC AUTO-ENTMCNC: 33 G/DL (ref 31.5–35.7)
MCV RBC AUTO: 87.4 FL (ref 79–97)
MONOCYTES # BLD AUTO: 0.78 10*3/MM3 (ref 0.1–0.9)
MONOCYTES NFR BLD AUTO: 7.2 % (ref 5–12)
NEUTROPHILS NFR BLD AUTO: 5.98 10*3/MM3 (ref 1.7–7)
NEUTROPHILS NFR BLD AUTO: 54.7 % (ref 42.7–76)
NITRITE UR QL STRIP: NEGATIVE
NRBC BLD AUTO-RTO: 0 /100 WBC (ref 0–0.2)
PH UR STRIP.AUTO: 5.5 [PH] (ref 5–8)
PLATELET # BLD AUTO: 329 10*3/MM3 (ref 140–450)
PMV BLD AUTO: 10.1 FL (ref 6–12)
POTASSIUM SERPL-SCNC: 4.3 MMOL/L (ref 3.5–5.2)
PROT SERPL-MCNC: 7.4 G/DL (ref 6–8.5)
PROT UR QL STRIP: NEGATIVE
RBC # BLD AUTO: 5.07 10*6/MM3 (ref 3.77–5.28)
SODIUM SERPL-SCNC: 138 MMOL/L (ref 136–145)
SP GR UR STRIP: 1.02 (ref 1–1.03)
UROBILINOGEN UR QL STRIP: ABNORMAL
WBC NRBC COR # BLD AUTO: 10.9 10*3/MM3 (ref 3.4–10.8)

## 2025-01-14 PROCEDURE — 81001 URINALYSIS AUTO W/SCOPE: CPT | Performed by: FAMILY MEDICINE

## 2025-01-14 PROCEDURE — 36415 COLL VENOUS BLD VENIPUNCTURE: CPT | Performed by: FAMILY MEDICINE

## 2025-01-14 PROCEDURE — 3074F SYST BP LT 130 MM HG: CPT | Performed by: FAMILY MEDICINE

## 2025-01-14 PROCEDURE — 99213 OFFICE O/P EST LOW 20 MIN: CPT | Performed by: FAMILY MEDICINE

## 2025-01-14 PROCEDURE — 85025 COMPLETE CBC W/AUTO DIFF WBC: CPT | Performed by: FAMILY MEDICINE

## 2025-01-14 PROCEDURE — 80053 COMPREHEN METABOLIC PANEL: CPT | Performed by: FAMILY MEDICINE

## 2025-01-14 PROCEDURE — 1125F AMNT PAIN NOTED PAIN PRSNT: CPT | Performed by: FAMILY MEDICINE

## 2025-01-14 PROCEDURE — 3078F DIAST BP <80 MM HG: CPT | Performed by: FAMILY MEDICINE

## 2025-01-14 RX ORDER — CIPROFLOXACIN 500 MG/1
500 TABLET, FILM COATED ORAL 2 TIMES DAILY
Qty: 20 TABLET | Refills: 0 | Status: SHIPPED | OUTPATIENT
Start: 2025-01-14 | End: 2025-01-24

## 2025-01-14 RX ORDER — METRONIDAZOLE 500 MG/1
500 TABLET ORAL 3 TIMES DAILY
Qty: 30 TABLET | Refills: 0 | Status: SHIPPED | OUTPATIENT
Start: 2025-01-14 | End: 2025-01-24

## 2025-01-14 RX ORDER — ALBUTEROL SULFATE 90 UG/1
2 INHALANT RESPIRATORY (INHALATION) EVERY 4 HOURS PRN
Qty: 8.5 G | Refills: 1 | Status: SHIPPED | OUTPATIENT
Start: 2025-01-14

## 2025-01-14 NOTE — PROGRESS NOTES
Chief Complaint  folowup and Abdominal Pain (Left side)    Subjective          Leilani Ordaz presents to [facility]    Abdominal Pain         History of Present Illness  The patient is a 69-year-old female who presents today for a follow-up.    She has been experiencing persistent abdominal pain for the past 3 weeks, which she initially suspected to be diverticulitis. The pain is constant and not severe enough to incapacitate her. She reports no dysuria or bowel irregularities, with her stools being normal in consistency. She reports no vaginal bleeding. She has a history of diverticulitis, and the current symptoms initially resembled this condition. However, despite maintaining hydration, consuming a bland diet, and avoiding spicy foods, the symptoms have not resolved. She also reports that her gallbladder symptoms are exacerbated by spicy or greasy foods, leading to nausea. She has recently noticed an increased sensitivity to onions and has consequently reduced their intake. She has attempted various dietary modifications to manage what she believes to be irritable stomach, but these have not resulted in any improvement. She does not consume alcohol frequently and does not believe her symptoms are related to acid reflux.    She is currently on Jardiance for diabetes management.    She is on calcium and vitamin D supplements following a left thyroidectomy.    She has a history of hemorrhoids, which occasionally bleed when strained. She reports no breast tenderness or rib pain.    She is requesting refills for Voltaren gel and albuterol inhaler.    Supplemental Information  She saw Dr. Montana for her left hip and received a steroid injection. She had pulled a muscle while bowling and felt a snap. She went to urgent care and was referred to orthopedics.    SOCIAL HISTORY  She rarely drinks alcohol.    MEDICATIONS  Current: Voltaren gel, albuterol inhaler, Jardiance, calcium,  "vitamin D         Current Outpatient Medications   Medication Instructions    albuterol sulfate  (90 Base) MCG/ACT inhaler 2 puffs, Inhalation, Every 4 Hours PRN    Aspirin Low Dose 81 mg, Oral, Daily    atorvastatin (LIPITOR) 80 mg, Oral, Daily    ciprofloxacin (CIPRO) 500 mg, Oral, 2 Times Daily    Diclofenac Sodium (VOLTAREN) 4 g, Topical, 4 Times Daily PRN    empagliflozin (JARDIANCE) 10 mg, Oral, Daily    fish oil 1,000 mg, 2 Times Daily With Meals    fluticasone (FLONASE) 50 MCG/ACT nasal spray 2 sprays, Nasal, Daily    HYDROcodone-acetaminophen (NORCO) 7.5-325 MG per tablet 1 tablet, Oral, Every 4 Hours PRN    levothyroxine (SYNTHROID) 50 mcg, Oral, Daily    lisinopril (PRINIVIL,ZESTRIL) 20 mg, Oral, Daily    loratadine (CLARITIN) 10 mg, Oral, Daily    metFORMIN (GLUCOPHAGE) 1,000 mg, Oral, 2 Times Daily    metroNIDAZOLE (FLAGYL) 500 mg, Oral, 3 Times Daily    montelukast (SINGULAIR) 10 mg, Oral, Nightly       The following portions of the patient's history were reviewed and updated as appropriate: allergies, current medications, past family history, past medical history, past social history, past surgical history, and problem list.    Objective   Vital Signs:   /78   Pulse 71   Temp 98 °F (36.7 °C) (Oral)   Ht 165.1 cm (65\")   Wt 71.5 kg (157 lb 11.2 oz)   SpO2 98%   BMI 26.24 kg/m²     BP Readings from Last 3 Encounters:   01/14/25 120/78   12/11/24 143/84   12/04/24 129/83     Wt Readings from Last 3 Encounters:   01/14/25 71.5 kg (157 lb 11.2 oz)   12/11/24 71.2 kg (157 lb)   12/04/24 72.1 kg (159 lb)           Physical Exam  Vitals reviewed.   Constitutional:       Appearance: Normal appearance.   HENT:      Head: Normocephalic and atraumatic.      Right Ear: External ear normal.      Left Ear: External ear normal.      Nose: Nose normal.   Eyes:      Conjunctiva/sclera: Conjunctivae normal.   Cardiovascular:      Rate and Rhythm: Normal rate and regular rhythm.      Heart sounds: No " murmur heard.     No friction rub. No gallop.   Pulmonary:      Effort: Pulmonary effort is normal.      Breath sounds: Normal breath sounds. No wheezing or rhonchi.   Abdominal:      General: Bowel sounds are normal. There is no distension.      Palpations: Abdomen is soft.      Tenderness: There is abdominal tenderness. There is no right CVA tenderness or left CVA tenderness.      Comments: Pain in the left upper and lower quadrants to deep palpation. Negative rivera's sign and no epigastric pain. No mass was palpated.    Skin:     General: Skin is warm and dry.   Neurological:      Mental Status: She is alert and oriented to person, place, and time.      Cranial Nerves: No cranial nerve deficit.   Psychiatric:         Mood and Affect: Mood and affect normal.         Behavior: Behavior normal.         Thought Content: Thought content normal.         Judgment: Judgment normal.            Result Review :   The following data was reviewed by: Js Tavares DO on 01/14/2025:  Common labs          4/17/2024    13:13 8/15/2024    09:26 11/26/2024    08:58   Common Labs   Glucose 128   99    BUN 15   17    Creatinine 1.22  1.20  1.11    Sodium 138   143    Potassium 4.4   4.3    Chloride 103   106    Calcium 10.3   9.9    Albumin 4.2   4.0    Total Bilirubin 0.3   0.4    Alkaline Phosphatase 67   69    AST (SGOT) 17   17    ALT (SGPT) 13   12    WBC 11.91   9.49    Hemoglobin 12.2   13.8    Hematocrit 37.1   41.2    Platelets 278   303    Total Cholesterol 111   129    Triglycerides 138   98    HDL Cholesterol 35   34    LDL Cholesterol  52   76    Hemoglobin A1C 6.60   6.20             Lab Results   Component Value Date    BILIRUBINUR Negative 11/26/2024       Results  Imaging  CT of abdomen and pelvis done in 2021 showed diverticulosis, gallstones, and large calcified masses in the uterus consistent with leiomyomas, largest measuring 3.6 cm. Probable 1 cm simple cyst in the right kidney was also  noted.    Procedures        Assessment and Plan    Diagnoses and all orders for this visit:    1. Diverticulitis (Primary)  -     CT Abdomen Pelvis Without Contrast; Future  -     CBC & Differential  -     Comprehensive Metabolic Panel    2. Greater trochanteric bursitis of left hip    3. Type 2 diabetes mellitus with hyperglycemia, without long-term current use of insulin    4. History of partial thyroidectomy    5. Vitamin D deficiency    6. Generalized abdominal pain  -     Urinalysis With Culture If Indicated - Urine, Clean Catch  -     CT Abdomen Pelvis Without Contrast; Future  -     CBC & Differential  -     Comprehensive Metabolic Panel    7. Fibroid    8. Calculus of gallbladder without cholecystitis without obstruction    Other orders  -     Diclofenac Sodium (VOLTAREN) 1 % gel gel; Apply 4 g topically to the appropriate area as directed 4 (Four) Times a Day As Needed (joint pain).  Dispense: 100 g; Refill: 1  -     albuterol sulfate  (90 Base) MCG/ACT inhaler; Inhale 2 puffs Every 4 (Four) Hours As Needed for Wheezing.  Dispense: 8.5 g; Refill: 1  -     ciprofloxacin (CIPRO) 500 MG tablet; Take 1 tablet by mouth 2 (Two) Times a Day for 10 days.  Dispense: 20 tablet; Refill: 0  -     metroNIDAZOLE (Flagyl) 500 MG tablet; Take 1 tablet by mouth 3 (Three) Times a Day for 10 days.  Dispense: 30 tablet; Refill: 0        Assessment & Plan  1. Left-sided abdominal pain.  The patient's symptoms do not appear to be related to her fibroids or gallstones. Given her history of diverticulitis, it is plausible that she may be experiencing a recurrence of this condition. A CT scan of the abdomen with oral contrast will be ordered to further investigate the cause of her pain. A 10-day course of antibiotics will be prescribed, and she is advised to adhere to a clear liquid diet, progressing as tolerated. She is also advised to abstain from alcohol consumption while on medication. A urinalysis and CBC will be  conducted today to assess her white blood cell count and kidney function. If her condition deteriorates or she develops a fever, she should seek immediate medical attention at the emergency room.    2. Diabetes mellitus.  She is currently on Jardiance and reports no issues with the medication.    3. Post-thyroidectomy status.  She is taking calcium and vitamin D supplements following a left thyroidectomy.    4. Hemorrhoids.  She has a history of hemorrhoids, which occasionally bleed when strained.    5. Medication management.  Refills for Voltaren gel and albuterol inhaler will be sent to [facility] pharmacy.    PROCEDURE  The patient received a steroid injection for her left hip administered by Dr. Montana.       Medications Discontinued During This Encounter   Medication Reason    Diclofenac Sodium (VOLTAREN) 1 % gel gel Reorder    albuterol sulfate  (90 Base) MCG/ACT inhaler Reorder          Follow Up   Return if symptoms worsen or fail to improve.  Patient was given instructions and counseling regarding her condition or for health maintenance advice. Please see specific information pulled into the AVS if appropriate.     Patient or patient representative verbalized consent for the use of Ambient Listening during the visit with  Js Tavares DO for chart documentation. 1/14/2025  12:49 EST    Js Tavares DO  01/14/25  13:02 EST

## 2025-01-15 LAB
BACTERIA UR QL AUTO: ABNORMAL /HPF
HYALINE CASTS UR QL AUTO: ABNORMAL /LPF
RBC # UR STRIP: ABNORMAL /HPF
REF LAB TEST METHOD: ABNORMAL
SQUAMOUS #/AREA URNS HPF: ABNORMAL /HPF
WBC # UR STRIP: ABNORMAL /HPF

## 2025-01-17 ENCOUNTER — PATIENT MESSAGE (OUTPATIENT)
Dept: FAMILY MEDICINE CLINIC | Facility: CLINIC | Age: 70
End: 2025-01-17
Payer: MEDICARE

## 2025-01-22 ENCOUNTER — HOSPITAL ENCOUNTER (OUTPATIENT)
Dept: CT IMAGING | Facility: HOSPITAL | Age: 70
Discharge: HOME | End: 2025-01-22
Admitting: FAMILY MEDICINE
Payer: MEDICARE

## 2025-01-22 DIAGNOSIS — K57.92 DIVERTICULITIS: ICD-10-CM

## 2025-01-22 DIAGNOSIS — R10.84 GENERALIZED ABDOMINAL PAIN: ICD-10-CM

## 2025-01-22 PROCEDURE — 74176 CT ABD & PELVIS W/O CONTRAST: CPT

## 2025-01-24 DIAGNOSIS — K80.20 CALCULUS OF GALLBLADDER WITHOUT CHOLECYSTITIS WITHOUT OBSTRUCTION: Primary | ICD-10-CM

## 2025-01-24 DIAGNOSIS — R10.84 GENERALIZED ABDOMINAL PAIN: ICD-10-CM

## 2025-01-27 ENCOUNTER — OFFICE VISIT (OUTPATIENT)
Dept: SURGERY | Facility: CLINIC | Age: 70
End: 2025-01-27
Payer: MEDICARE

## 2025-01-27 VITALS
BODY MASS INDEX: 26.24 KG/M2 | HEART RATE: 74 BPM | WEIGHT: 157.5 LBS | SYSTOLIC BLOOD PRESSURE: 147 MMHG | DIASTOLIC BLOOD PRESSURE: 87 MMHG | HEIGHT: 65 IN

## 2025-01-27 DIAGNOSIS — K80.20 CALCULUS OF GALLBLADDER WITHOUT CHOLECYSTITIS WITHOUT OBSTRUCTION: Primary | ICD-10-CM

## 2025-01-27 DIAGNOSIS — R10.12 LEFT UPPER QUADRANT ABDOMINAL PAIN: ICD-10-CM

## 2025-01-27 PROCEDURE — 3077F SYST BP >= 140 MM HG: CPT | Performed by: STUDENT IN AN ORGANIZED HEALTH CARE EDUCATION/TRAINING PROGRAM

## 2025-01-27 PROCEDURE — 3079F DIAST BP 80-89 MM HG: CPT | Performed by: STUDENT IN AN ORGANIZED HEALTH CARE EDUCATION/TRAINING PROGRAM

## 2025-01-27 PROCEDURE — 99204 OFFICE O/P NEW MOD 45 MIN: CPT | Performed by: STUDENT IN AN ORGANIZED HEALTH CARE EDUCATION/TRAINING PROGRAM

## 2025-01-27 PROCEDURE — 1159F MED LIST DOCD IN RCRD: CPT | Performed by: STUDENT IN AN ORGANIZED HEALTH CARE EDUCATION/TRAINING PROGRAM

## 2025-01-27 PROCEDURE — 1160F RVW MEDS BY RX/DR IN RCRD: CPT | Performed by: STUDENT IN AN ORGANIZED HEALTH CARE EDUCATION/TRAINING PROGRAM

## 2025-01-27 RX ORDER — CIPROFLOXACIN 500 MG/1
500 TABLET, FILM COATED ORAL 2 TIMES DAILY
COMMUNITY

## 2025-01-27 RX ORDER — METRONIDAZOLE 500 MG/1
500 TABLET ORAL 3 TIMES DAILY
COMMUNITY

## 2025-01-27 NOTE — PROGRESS NOTES
Patient Name:  Leilani Ordaz  YOB: 1955  [id number]    Referring Provider: Js Tavares DO    Patient Care Team:  Js Tavares DO as PCP - General (Family Medicine)      Chief Complaint  Cholelithiasis (PATIENT COMPLAINS OF LUQ PAIN GOING UP INTO THE BACK WITH NAUSEA AND VOMITING. PATIENT STATES SHE HAS A HX OF DIVERTICULITIS./)    Subjective     Leilani Ordaz is a 69 y.o. female who presents to [facility]    History of Present Illness  69-year-old female who presents today as a new referral for cholelithiasis.  Patient has an approximately 5-week history of left upper quadrant abdominal pain.  She has had bouts of diverticulitis in the past and initially attributed her symptoms to that.  She was prescribed a course of oral antibiotics which she will finish taking later this week.  She denies any fevers or chills, but has had some associated nausea and vomiting.  She is unable to identify any food triggers for her pain as her symptoms seem to get worse at random.  She has never had symptoms like this before.  She has a history of left lower abdominal and hip pain for which she recently just completed physical therapy for.  She has no previous abdominal surgical history.  She denies any trauma along her left flank.      History     Past Medical History:   Diagnosis Date    Allergic     Ankle pain, left     Arthritis     Broken bones     Cataract     Cholelithiasis     Colon polyp     Coronary artery disease     PER PCP NOTE. ECHO/STRESS DONE 2023    DDD (degenerative disc disease), lumbar     L4-L5    Diabetes     Diverticulosis     Esophageal reflux     Fibromyalgia, primary     Ganglion cyst 12/18/2018    Hemorrhoids     HLD (hyperlipidemia)     HTN (hypertension) 12/18/2018    Hyperlipemia     Limb swelling     Low back pain     Migraine     Neuropathy in diabetes 07/01/2019    OR SOONER    Night sweats     Seasonal allergies     Sinus congestion      Sinus trouble     Skin disease     Syncope     Type 2 diabetes mellitus with stage 3 chronic kidney disease, without long-term current use of insulin 12/18/2018       Past Surgical History:   Procedure Laterality Date    COLONOSCOPY  2018    FOLLOW UP IN 5 YEARS (2023)    COLONOSCOPY N/A 1/31/2024    Procedure: COLONOSCOPY WITH POLYPECTROMY;  Surgeon: Shola Kim MD;  Location: [facility] ENDOSCOPY;  Service: Gastroenterology;  Laterality: N/A;  DIVERTICULOSIS, COLON POLYP    CYST REMOVAL      FROM THROAT    EYE SURGERY      CATARACT SURGERY    HAND SURGERY      CALCIUM REMOVED FROM THUMB    THYROIDECTOMY Bilateral 12/5/2023    Procedure: LEFT THYROIDECTOMY with isthmusectomy and FROZEN SECTION, , RECURRENT LARYNGEAL NERVE MONITORING;  Surgeon: Drew Nicole MD;  Location: [facility] MAIN OR;  Service: ENT;  Laterality: Bilateral;       Family History   Problem Relation Age of Onset    Heart disease Mother     Diabetes Mother     Arthritis Mother     Heart disease Father     Diabetes Father     Arthritis Father     Rashes / Skin problems Father     Arthritis Sister     Diabetes Daughter     Diabetes Son     Heart disease Other     Diabetes Other     Heart disease Other     Diabetes Other     Heart disease Other     Diabetes Other     Colon cancer Neg Hx        Social History     Tobacco Use    Smoking status: Never     Passive exposure: Past    Smokeless tobacco: Never   Vaping Use    Vaping status: Never Used   Substance Use Topics    Alcohol use: Not Currently     Comment: LIQUOR BUT VARY RARELY    Drug use: Not Currently       No Known Allergies    Prior to Admission medications    Medication Sig Start Date End Date Taking? Authorizing Provider   albuterol sulfate  (90 Base) MCG/ACT inhaler Inhale 2 puffs Every 4 (Four) Hours As Needed for Wheezing. 1/14/25  Yes Js Tavares, DO   Aspirin Low Dose 81 MG EC tablet Take 1 tablet by mouth Daily. 12/13/23  Yes Drew Nicole MD   atorvastatin  "(LIPITOR) 80 MG tablet TAKE 1 TABLET BY MOUTH DAILY 2/6/24  Yes Js Tavares DO   ciprofloxacin (CIPRO) 500 MG tablet Take 1 tablet by mouth 2 (Two) Times a Day.   Yes ProviderEmily MD   Diclofenac Sodium (VOLTAREN) 1 % gel gel Apply 4 g topically to the appropriate area as directed 4 (Four) Times a Day As Needed (joint pain). 1/14/25  Yes Js Tavares DO   empagliflozin (Jardiance) 10 MG tablet tablet Take 1 tablet by mouth Daily. 12/10/24  Yes Js Tavares DO   fluticasone (FLONASE) 50 MCG/ACT nasal spray 2 sprays into the nostril(s) as directed by provider Daily. 10/24/23  Yes Js Tavares DO   levothyroxine (Synthroid) 50 MCG tablet Take 1 tablet by mouth Daily. 4/22/24  Yes Drew Nicole MD   lisinopril (PRINIVIL,ZESTRIL) 20 MG tablet Take 1 tablet by mouth Daily. 2/26/24  Yes Js Tavares DO   loratadine (Claritin) 10 MG tablet Take 1 tablet by mouth Daily. 6/5/23  Yes Js Tavares DO   metFORMIN (GLUCOPHAGE) 1000 MG tablet TAKE 1 TABLET BY MOUTH TWICE A DAY 3/20/24  Yes Js Tavares DO   metroNIDAZOLE (FLAGYL) 500 MG tablet Take 1 tablet by mouth 3 (Three) Times a Day.   Yes ProviderEmily MD   montelukast (Singulair) 10 MG tablet Take 1 tablet by mouth Every Night. 12/18/23  Yes Js Tvaares DO   Omega-3 Fatty Acids (fish oil) 1000 MG capsule capsule Take 1 capsule by mouth 2 (Two) Times a Day With Meals.   Yes ProviderEmily MD   HYDROcodone-acetaminophen (NORCO) 7.5-325 MG per tablet Take 1 tablet by mouth Every 4 (Four) Hours As Needed for Moderate Pain (Pain).  Patient not taking: Reported on 1/27/2025 12/6/23   Drew Nicole MD       Objective    Objective              Vital Signs:   /87 (BP Location: Left arm, Patient Position: Sitting, Cuff Size: Adult)   Pulse 74   Ht 165.1 cm (65\")   Wt 71.4 kg (157 lb 8 oz)   BMI 26.21 kg/m²       Physical Exam  Constitutional:       Appearance: Normal appearance.   HENT:      Head: " Normocephalic and atraumatic.      Mouth/Throat:      Mouth: Mucous membranes are moist.      Pharynx: Oropharynx is clear.   Cardiovascular:      Rate and Rhythm: Normal rate and regular rhythm.   Pulmonary:      Effort: Pulmonary effort is normal. No respiratory distress.   Abdominal:      General: There is no distension.      Palpations: Abdomen is soft.      Tenderness: There is no abdominal tenderness.   Musculoskeletal:         General: No swelling. Normal range of motion.      Cervical back: Normal range of motion and neck supple.   Skin:     General: Skin is warm and dry.   Neurological:      General: No focal deficit present.      Mental Status: She is alert and oriented to person, place, and time.   Psychiatric:         Mood and Affect: Mood normal.         Behavior: Behavior normal.                Assessment / Plan      Diagnoses and all orders for this visit:    1. Calculus of gallbladder without cholecystitis without obstruction (Primary)    2. Left upper quadrant abdominal pain  -     Case Request; Standing  -     Case Request    Other orders  -     Follow Anesthesia Guidelines / Protocol; Standing  -     Follow Anesthesia Guidelines / Protocol; Future  -     Verify NPO; Standing    69-year-old female with asymptomatic cholelithiasis.  She has no classic biliary type pain.  She does have large gallstones on her CT but no obvious identifiable causes of her left upper quadrant pain on imaging.  Her symptoms could be seen with atypical reflux/indigestion so we will plan for further evaluation with EGD possible biopsy and any other indicated procedure.  Risks/benefits/alternatives of the procedure were explained to the patient and she was agreeable to proceed.    Follow Up   Return for Post-op.      Patient was given instructions and counseling regarding her condition or for health maintenance advice. Please see specific information pulled into the AVS if appropriate.     Electronically signed by Jay  MD Eric, 01/27/25, 10:13 AM EST.

## 2025-01-27 NOTE — H&P (VIEW-ONLY)
Patient Name:  Leilani Ordaz  YOB: 1955  [id number]    Referring Provider: Js Tavares DO    Patient Care Team:  Js Tavares DO as PCP - General (Family Medicine)      Chief Complaint  Cholelithiasis (PATIENT COMPLAINS OF LUQ PAIN GOING UP INTO THE BACK WITH NAUSEA AND VOMITING. PATIENT STATES SHE HAS A HX OF DIVERTICULITIS./)    Subjective     Leilani Ordaz is a 69 y.o. female who presents to [facility] GENERAL [facility]ERY    History of Present Illness  69-year-old female who presents today as a new referral for cholelithiasis.  Patient has an approximately 5-week history of left upper quadrant abdominal pain.  She has had bouts of diverticulitis in the past and initially attributed her symptoms to that.  She was prescribed a course of oral antibiotics which she will finish taking later this week.  She denies any fevers or chills, but has had some associated nausea and vomiting.  She is unable to identify any food triggers for her pain as her symptoms seem to get worse at random.  She has never had symptoms like this before.  She has a history of left lower abdominal and hip pain for which she recently just completed physical therapy for.  She has no previous abdominal surgical history.  She denies any trauma along her left flank.      History     Past Medical History:   Diagnosis Date    Allergic     Ankle pain, left     Arthritis     Broken bones     Cataract     Cholelithiasis     Colon polyp     Coronary artery disease     PER PCP NOTE. ECHO/STRESS DONE 2023    DDD (degenerative disc disease), lumbar     L4-L5    Diabetes     Diverticulosis     Esophageal reflux     Fibromyalgia, primary     Ganglion cyst 12/18/2018    Hemorrhoids     HLD (hyperlipidemia)     HTN (hypertension) 12/18/2018    Hyperlipemia     Limb swelling     Low back pain     Migraine     Neuropathy in diabetes 07/01/2019    OR SOONER    Night sweats     Seasonal allergies     Sinus congestion      Sinus trouble     Skin disease     Syncope     Type 2 diabetes mellitus with stage 3 chronic kidney disease, without long-term current use of insulin 12/18/2018       Past Surgical History:   Procedure Laterality Date    COLONOSCOPY  2018    FOLLOW UP IN 5 YEARS (2023)    COLONOSCOPY N/A 1/31/2024    Procedure: COLONOSCOPY WITH POLYPECTROMY;  Surgeon: Shola Kim MD;  Location: [facility] ENDOSCOPY;  Service: Gastroenterology;  Laterality: N/A;  DIVERTICULOSIS, COLON POLYP    CYST REMOVAL      FROM THROAT    EYE SURGERY      CATARACT SURGERY    HAND SURGERY      CALCIUM REMOVED FROM THUMB    THYROIDECTOMY Bilateral 12/5/2023    Procedure: LEFT THYROIDECTOMY with isthmusectomy and FROZEN SECTION, , RECURRENT LARYNGEAL NERVE MONITORING;  Surgeon: Drew Nicole MD;  Location: [facility] MAIN OR;  Service: ENT;  Laterality: Bilateral;       Family History   Problem Relation Age of Onset    Heart disease Mother     Diabetes Mother     Arthritis Mother     Heart disease Father     Diabetes Father     Arthritis Father     Rashes / Skin problems Father     Arthritis Sister     Diabetes Daughter     Diabetes Son     Heart disease Other     Diabetes Other     Heart disease Other     Diabetes Other     Heart disease Other     Diabetes Other     Colon cancer Neg Hx        Social History     Tobacco Use    Smoking status: Never     Passive exposure: Past    Smokeless tobacco: Never   Vaping Use    Vaping status: Never Used   Substance Use Topics    Alcohol use: Not Currently     Comment: LIQUOR BUT VARY RARELY    Drug use: Not Currently       No Known Allergies    Prior to Admission medications    Medication Sig Start Date End Date Taking? Authorizing Provider   albuterol sulfate  (90 Base) MCG/ACT inhaler Inhale 2 puffs Every 4 (Four) Hours As Needed for Wheezing. 1/14/25  Yes Js Tavares, DO   Aspirin Low Dose 81 MG EC tablet Take 1 tablet by mouth Daily. 12/13/23  Yes Drew Nicole MD   atorvastatin  "(LIPITOR) 80 MG tablet TAKE 1 TABLET BY MOUTH DAILY 2/6/24  Yes Js Tavares DO   ciprofloxacin (CIPRO) 500 MG tablet Take 1 tablet by mouth 2 (Two) Times a Day.   Yes ProviderEmily MD   Diclofenac Sodium (VOLTAREN) 1 % gel gel Apply 4 g topically to the appropriate area as directed 4 (Four) Times a Day As Needed (joint pain). 1/14/25  Yes Js Tavares DO   empagliflozin (Jardiance) 10 MG tablet tablet Take 1 tablet by mouth Daily. 12/10/24  Yes Js Tavares DO   fluticasone (FLONASE) 50 MCG/ACT nasal spray 2 sprays into the nostril(s) as directed by provider Daily. 10/24/23  Yes Js Tavares DO   levothyroxine (Synthroid) 50 MCG tablet Take 1 tablet by mouth Daily. 4/22/24  Yes Drew Nicole MD   lisinopril (PRINIVIL,ZESTRIL) 20 MG tablet Take 1 tablet by mouth Daily. 2/26/24  Yes Js Tavares DO   loratadine (Claritin) 10 MG tablet Take 1 tablet by mouth Daily. 6/5/23  Yes Js Tavares DO   metFORMIN (GLUCOPHAGE) 1000 MG tablet TAKE 1 TABLET BY MOUTH TWICE A DAY 3/20/24  Yes Js Tavaers DO   metroNIDAZOLE (FLAGYL) 500 MG tablet Take 1 tablet by mouth 3 (Three) Times a Day.   Yes ProviderEmily MD   montelukast (Singulair) 10 MG tablet Take 1 tablet by mouth Every Night. 12/18/23  Yes Js Tavares DO   Omega-3 Fatty Acids (fish oil) 1000 MG capsule capsule Take 1 capsule by mouth 2 (Two) Times a Day With Meals.   Yes ProviderEmily MD   HYDROcodone-acetaminophen (NORCO) 7.5-325 MG per tablet Take 1 tablet by mouth Every 4 (Four) Hours As Needed for Moderate Pain (Pain).  Patient not taking: Reported on 1/27/2025 12/6/23   Drew Nicole MD       Objective    Objective              Vital Signs:   /87 (BP Location: Left arm, Patient Position: Sitting, Cuff Size: Adult)   Pulse 74   Ht 165.1 cm (65\")   Wt 71.4 kg (157 lb 8 oz)   BMI 26.21 kg/m²       Physical Exam  Constitutional:       Appearance: Normal appearance.   HENT:      Head: " Normocephalic and atraumatic.      Mouth/Throat:      Mouth: Mucous membranes are moist.      Pharynx: Oropharynx is clear.   Cardiovascular:      Rate and Rhythm: Normal rate and regular rhythm.   Pulmonary:      Effort: Pulmonary effort is normal. No respiratory distress.   Abdominal:      General: There is no distension.      Palpations: Abdomen is soft.      Tenderness: There is no abdominal tenderness.   Musculoskeletal:         General: No swelling. Normal range of motion.      Cervical back: Normal range of motion and neck supple.   Skin:     General: Skin is warm and dry.   Neurological:      General: No focal deficit present.      Mental Status: She is alert and oriented to person, place, and time.   Psychiatric:         Mood and Affect: Mood normal.         Behavior: Behavior normal.                Assessment / Plan      Diagnoses and all orders for this visit:    1. Calculus of gallbladder without cholecystitis without obstruction (Primary)    2. Left upper quadrant abdominal pain  -     Case Request; Standing  -     Case Request    Other orders  -     Follow Anesthesia Guidelines / Protocol; Standing  -     Follow Anesthesia Guidelines / Protocol; Future  -     Verify NPO; Standing    69-year-old female with asymptomatic cholelithiasis.  She has no classic biliary type pain.  She does have large gallstones on her CT but no obvious identifiable causes of her left upper quadrant pain on imaging.  Her symptoms could be seen with atypical reflux/indigestion so we will plan for further evaluation with EGD possible biopsy and any other indicated procedure.  Risks/benefits/alternatives of the procedure were explained to the patient and she was agreeable to proceed.    Follow Up   Return for Post-op.      Patient was given instructions and counseling regarding her condition or for health maintenance advice. Please see specific information pulled into the AVS if appropriate.     Electronically signed by Jay  MD Eric, 01/27/25, 10:13 AM EST.

## 2025-02-07 NOTE — PRE-PROCEDURE INSTRUCTIONS
Left with a message with Mr Ordaz for Mrs Ordaz to call endoscopy for information related to her procedure scheduled for Feb 14th 2025.

## 2025-02-10 NOTE — PRE-PROCEDURE INSTRUCTIONS
"Instructed on date and arrival time of 0900. Instructed that arrival time is not procedure time but allows time to prepare for procedure. Come to entrance \"C\".  Must have  over age 18 to drive home.  May have two visitors; however, children under 12 must stay in waiting room.  Discussed diet/NPO.  May take medications as usual except for blood thinners, diabetic medications, or weight loss medications.  Verbalized understanding of instructions given.  Instructed to call for questions or concerns.  "

## 2025-02-13 ENCOUNTER — ANESTHESIA EVENT (OUTPATIENT)
Dept: GASTROENTEROLOGY | Facility: HOSPITAL | Age: 70
End: 2025-02-13
Payer: MEDICARE

## 2025-02-13 NOTE — ANESTHESIA PREPROCEDURE EVALUATION
Anesthesia Evaluation     Patient summary reviewed and Nursing notes reviewed   NPO Solid Status: > 8 hours             Airway   Mallampati: I  TM distance: >3 FB  Neck ROM: full  No difficulty expected  Dental    (+) upper dentures and poor dentition    Pulmonary     breath sounds clear to auscultation  Cardiovascular   Exercise tolerance: good (4-7 METS)    ECG reviewed  Rhythm: regular  Rate: normal    (+) hypertension well controlled, CAD, hyperlipidemia      Neuro/Psych  (+) seizures (not actual seizure , just syncopal episode 7 yrs ago) well controlled, headaches, syncope, numbness  GI/Hepatic/Renal/Endo    (+) obesity, GERD well controlled, renal disease- CRI, diabetes mellitus type 2 well controlled, thyroid problem     Musculoskeletal     (+) myalgias  Abdominal    Substance History      OB/GYN          Other   arthritis,     ROS/Med Hx Other: LUQ pain     ECHO 07/19/23: EF 77%,   Left ventricular hypertrophy with normal left ventricular systolic function.  No significant valve abnormalities noted.    EKG 10/20/21: HR 72, SR, probable anterolateral infarct, old                    Anesthesia Plan    ASA 3     general   total IV anesthesia  (Total IV Anesthesia    Patient understands anesthesia not responsible for dental damage.      Discussed risks with pt including aspiration, allergic reactions, apnea, advanced airway placement. Pt verbalized understanding. All questions answered.   )  intravenous induction     Anesthetic plan, risks, benefits, and alternatives have been provided, discussed and informed consent has been obtained with: patient.  Pre-procedure education provided  Plan discussed with CRNA.      CODE STATUS:

## 2025-02-14 ENCOUNTER — ANESTHESIA (OUTPATIENT)
Dept: GASTROENTEROLOGY | Facility: HOSPITAL | Age: 70
End: 2025-02-14
Payer: MEDICARE

## 2025-02-14 ENCOUNTER — HOSPITAL ENCOUNTER (OUTPATIENT)
Facility: HOSPITAL | Age: 70
Setting detail: HOSPITAL OUTPATIENT SURGERY
Discharge: HOME | End: 2025-02-14
Attending: STUDENT IN AN ORGANIZED HEALTH CARE EDUCATION/TRAINING PROGRAM | Admitting: STUDENT IN AN ORGANIZED HEALTH CARE EDUCATION/TRAINING PROGRAM
Payer: MEDICARE

## 2025-02-14 VITALS
DIASTOLIC BLOOD PRESSURE: 80 MMHG | SYSTOLIC BLOOD PRESSURE: 158 MMHG | BODY MASS INDEX: 26.16 KG/M2 | WEIGHT: 157.19 LBS | OXYGEN SATURATION: 100 % | TEMPERATURE: 97.9 F | HEART RATE: 59 BPM | RESPIRATION RATE: 17 BRPM

## 2025-02-14 DIAGNOSIS — R10.12 LEFT UPPER QUADRANT ABDOMINAL PAIN: ICD-10-CM

## 2025-02-14 LAB — GLUCOSE BLDC GLUCOMTR-MCNC: 131 MG/DL (ref 70–99)

## 2025-02-14 PROCEDURE — 82948 REAGENT STRIP/BLOOD GLUCOSE: CPT

## 2025-02-14 PROCEDURE — 25010000002 LIDOCAINE PF 2% 2 % SOLUTION: Performed by: NURSE ANESTHETIST, CERTIFIED REGISTERED

## 2025-02-14 PROCEDURE — 88305 TISSUE EXAM BY PATHOLOGIST: CPT | Performed by: STUDENT IN AN ORGANIZED HEALTH CARE EDUCATION/TRAINING PROGRAM

## 2025-02-14 PROCEDURE — 25810000003 SODIUM CHLORIDE 0.9 % SOLUTION: Performed by: STUDENT IN AN ORGANIZED HEALTH CARE EDUCATION/TRAINING PROGRAM

## 2025-02-14 PROCEDURE — 25010000002 PROPOFOL 10 MG/ML EMULSION: Performed by: NURSE ANESTHETIST, CERTIFIED REGISTERED

## 2025-02-14 PROCEDURE — 88342 IMHCHEM/IMCYTCHM 1ST ANTB: CPT | Performed by: STUDENT IN AN ORGANIZED HEALTH CARE EDUCATION/TRAINING PROGRAM

## 2025-02-14 RX ORDER — SODIUM CHLORIDE, SODIUM LACTATE, POTASSIUM CHLORIDE, CALCIUM CHLORIDE 600; 310; 30; 20 MG/100ML; MG/100ML; MG/100ML; MG/100ML
30 INJECTION, SOLUTION INTRAVENOUS CONTINUOUS
Status: DISCONTINUED | OUTPATIENT
Start: 2025-02-14 | End: 2025-02-14 | Stop reason: HOSPADM

## 2025-02-14 RX ORDER — LIDOCAINE HYDROCHLORIDE 20 MG/ML
INJECTION, SOLUTION EPIDURAL; INFILTRATION; INTRACAUDAL; PERINEURAL AS NEEDED
Status: DISCONTINUED | OUTPATIENT
Start: 2025-02-14 | End: 2025-02-14 | Stop reason: SURG

## 2025-02-14 RX ORDER — SODIUM CHLORIDE 9 MG/ML
50 INJECTION, SOLUTION INTRAVENOUS CONTINUOUS
Status: ACTIVE | OUTPATIENT
Start: 2025-02-14 | End: 2025-02-14

## 2025-02-14 RX ORDER — ONDANSETRON 4 MG/1
4 TABLET, ORALLY DISINTEGRATING ORAL ONCE AS NEEDED
Status: DISCONTINUED | OUTPATIENT
Start: 2025-02-14 | End: 2025-02-14 | Stop reason: HOSPADM

## 2025-02-14 RX ORDER — PROPOFOL 10 MG/ML
VIAL (ML) INTRAVENOUS AS NEEDED
Status: DISCONTINUED | OUTPATIENT
Start: 2025-02-14 | End: 2025-02-14 | Stop reason: SURG

## 2025-02-14 RX ADMIN — PROPOFOL 50 MG: 10 INJECTION, EMULSION INTRAVENOUS at 10:31

## 2025-02-14 RX ADMIN — SODIUM CHLORIDE 50 ML/HR: 9 INJECTION, SOLUTION INTRAVENOUS at 10:07

## 2025-02-14 RX ADMIN — PROPOFOL 200 MCG/KG/MIN: 10 INJECTION, EMULSION INTRAVENOUS at 10:31

## 2025-02-14 RX ADMIN — LIDOCAINE HYDROCHLORIDE 40 MG: 20 INJECTION, SOLUTION INTRAVENOUS at 10:31

## 2025-02-14 NOTE — ANESTHESIA POSTPROCEDURE EVALUATION
Patient: Leilani Ordaz    Procedure Summary       Date: 02/14/25 Room / Location: [facility] ENDOSCOPY 3 / [facility] END[facility]COPY    Anesthesia Start: 1029 Anesthesia Stop: 1045    Procedure: ESOPHAGOGASTRODUODENOSCOPY with biopsies Diagnosis:       Left upper quadrant abdominal pain      (Left upper quadrant abdominal pain [R10.12])    Surgeons: Jay Reyes MD Provider: Brigette Ugarte CRNA    Anesthesia Type: general ASA Status: 3            Anesthesia Type: general    Vitals  Vitals Value Taken Time   /80 02/14/25 1102   Temp 36.6 °C (97.9 °F) 02/14/25 1102   Pulse 61 02/14/25 1103   Resp 17 02/14/25 1102   SpO2 100 % 02/14/25 1103   Vitals shown include unfiled device data.        Post Anesthesia Care and Evaluation    Post-procedure mental status: acceptable.  Pain management: satisfactory to patient    Airway patency: patent  Anesthetic complications: No anesthetic complications    Cardiovascular status: acceptable  Respiratory status: acceptable    Comments: Per chart review

## 2025-02-17 LAB
CYTO UR: NORMAL
LAB AP CASE REPORT: NORMAL
LAB AP CLINICAL INFORMATION: NORMAL
LAB AP SPECIAL STAINS: NORMAL
PATH REPORT.FINAL DX SPEC: NORMAL
PATH REPORT.GROSS SPEC: NORMAL

## 2025-02-19 DIAGNOSIS — K21.9 GASTROESOPHAGEAL REFLUX DISEASE WITHOUT ESOPHAGITIS: Primary | ICD-10-CM

## 2025-02-19 RX ORDER — PANTOPRAZOLE SODIUM 40 MG/1
40 TABLET, DELAYED RELEASE ORAL DAILY
Qty: 30 TABLET | Refills: 5 | Status: SHIPPED | OUTPATIENT
Start: 2025-02-19 | End: 2026-02-19

## 2025-04-07 ENCOUNTER — OFFICE VISIT (OUTPATIENT)
Dept: FAMILY MEDICINE CLINIC | Facility: CLINIC | Age: 70
End: 2025-04-07
Payer: MEDICARE

## 2025-04-07 VITALS
BODY MASS INDEX: 26.21 KG/M2 | WEIGHT: 157.3 LBS | HEIGHT: 65 IN | TEMPERATURE: 98.2 F | OXYGEN SATURATION: 99 % | HEART RATE: 69 BPM | SYSTOLIC BLOOD PRESSURE: 132 MMHG | DIASTOLIC BLOOD PRESSURE: 70 MMHG

## 2025-04-07 DIAGNOSIS — K29.00 ACUTE GASTRITIS, PRESENCE OF BLEEDING UNSPECIFIED, UNSPECIFIED GASTRITIS TYPE: Primary | ICD-10-CM

## 2025-04-07 DIAGNOSIS — E11.65 TYPE 2 DIABETES MELLITUS WITH HYPERGLYCEMIA, WITHOUT LONG-TERM CURRENT USE OF INSULIN: ICD-10-CM

## 2025-04-07 DIAGNOSIS — L50.8 OTHER URTICARIA: ICD-10-CM

## 2025-04-07 PROCEDURE — 86003 ALLG SPEC IGE CRUDE XTRC EA: CPT | Performed by: FAMILY MEDICINE

## 2025-04-07 RX ORDER — SUCRALFATE ORAL 1 G/10ML
1 SUSPENSION ORAL
Qty: 414 ML | Refills: 1 | Status: SHIPPED | OUTPATIENT
Start: 2025-04-07 | End: 2025-04-08 | Stop reason: SDUPTHER

## 2025-04-07 RX ORDER — MULTIPLE VITAMINS W/ MINERALS TAB 9MG-400MCG
1 TAB ORAL DAILY
COMMUNITY

## 2025-04-07 NOTE — PROGRESS NOTES
Chief Complaint  Abdominal Pain and Vomiting    Subjective      Leilani Ordaz is a 69 y.o. female who presents to [facility]     History of Present Illness  The patient is a 69-year-old female who presents today with complaints of pain on her left side with some intermittent vomiting. She reports that she has had symptoms on and off for a while. She was sent to gastroenterology by Dr. Tavares. The patient states that she does not have any stomach problems outside of the vomiting. She also feels slightly jittery and itchy, and she is wondering if she could potentially be having a medication reaction as it started up after one of her diabetes medications (Jardiance). She also reports some occasional shortness of breath.    She has been experiencing persistent left-sided abdominal pain, which was previously evaluated by Dr. Tavares through a CT scan in 01/2025. A subsequent endoscopy performed by a gastroenterologist revealed an infection, but no other abnormalities were detected. Biopsy results were negative for ulcer or cancer. She was prescribed antibiotics and pantoprazole, the latter of which has not alleviated her pain. The intensity of her pain fluctuates, occasionally reaching a level of 5 or 6 on a scale of 10. She has been maintaining a food diary for the past two weeks, but has not identified any specific triggers for her symptoms. Despite consuming salads for the past few days, she has not noticed any improvement. She has a history of gallstones, but these were ruled out as the cause of her current symptoms.    She experiences itching all over her body and suspects a possible allergic reaction to her diabetes medication. She has a known allergy to PINEAPPLE, which previously caused hives but now only induces itching. This itching typically begins at her elbow and progresses down to her wrist before resolving. She has discontinued the use of loratadine and montelukast  "due to lack of efficacy. She has a history of sinus issues.    She has been on Jardiance for approximately 9 months and believes her symptoms may have started around the same time. She was informed that Jardiance could potentially cause vomiting. She took her Jardiance about 1.5 hours prior to the visit today. She was prescribed Jardiance to help with her kidneys and cardiovascular risk reduction. Her blood sugar levels were well-controlled prior to starting Jardiance.    She has a history of lactose intolerance, which causes gas and constipation, and has reduced her milk intake as a result. She has not tried Lactaid milk and avoids artificial sweeteners.    She also reports feeling jittery and experiencing heart palpitations, shortness of breath, and fatigue.    She has been diagnosed with arthritis and experiences severe joint pain, particularly in her knees, which can become so intense that it impedes her ability to walk.    FAMILY HISTORY  - No family history of food allergies    ALLERGIES  - Previously allergic to PINEAPPLE: caused hives and itching, but believes she has become unallergic    MEDICATIONS  - Current:    - Pantoprazole    - Jardiance  - Discontinued:    - Cipro    - Flonase nasal spray    - Norco    - Claritin    - Montelukast    - Flagyl        Patient Care Team:  Js Tavares DO as PCP - General (Family Medicine)    Objective   Vital Signs:   Vitals:    04/07/25 1020   BP: 132/70   Pulse: 69   Temp: 98.2 °F (36.8 °C)   SpO2: 99%   Weight: 71.4 kg (157 lb 4.8 oz)   Height: 165.1 cm (65\")     Body mass index is 26.18 kg/m².    Wt Readings from Last 3 Encounters:   04/07/25 71.4 kg (157 lb 4.8 oz)   02/14/25 71.3 kg (157 lb 3 oz)   01/27/25 71.4 kg (157 lb 8 oz)     BP Readings from Last 3 Encounters:   04/07/25 132/70   02/14/25 158/80   01/27/25 147/87       Health Maintenance   Topic Date Due    ZOSTER VACCINE (1 of 2) Never done    DIABETIC EYE EXAM  07/12/2024    COVID-19 Vaccine (3 - " 2024-25 season) 09/01/2024    URINE MICROALBUMIN-CREATININE RATIO (uACR)  10/19/2024    ANNUAL WELLNESS VISIT  12/18/2024    HEMOGLOBIN A1C  05/26/2025    INFLUENZA VACCINE  07/01/2025    DXA SCAN  07/25/2025    DIABETIC FOOT EXAM  10/07/2025    LIPID PANEL  11/26/2025    MAMMOGRAM  08/21/2026    COLORECTAL CANCER SCREENING  01/31/2029    TDAP/TD VACCINES (2 - Td or Tdap) 04/24/2033    HEPATITIS C SCREENING  Completed    Pneumococcal Vaccine 50+  Completed       Lab Results (last 24 hours)       Procedure Component Value Units Date/Time    Food Allergy Profile [[id number]] Collected: 04/07/25 1111    Specimen: Blood Updated: 04/07/25 1111               Physical Exam     Physical Exam  General Appearance: Normal.  Vital signs: Within normal limits.  HEENT: Within normal limits.  Respiratory: Lungs are clear.  Cardiovascular: Heart sounds are normal.  Gastrointestinal: Mild tenderness in the abdomen.  Skin: Warm and dry, no rash.  Neurological: Normal.      Result Review   The following data was reviewed by: Mary Beth Hunt MD on 04/07/2025:  [x]  Tests & Results  []  Hospitalization/Emergency Department/Urgent Care  []  Internal/External Consultant Notes    Results  - Laboratory Studies:    - H. pylori test: negative    - Imaging:    - CT scan: showed gallstones    - Testing:    - Endoscopy: revealed mild gastritis      Procedures          ASSESSMENT/PLAN  Diagnoses and all orders for this visit:    1. Acute gastritis, presence of bleeding unspecified, unspecified gastritis type (Primary)  -     sucralfate (Carafate) 1 GM/10ML suspension; Take 10 mL by mouth 4 (Four) Times a Day With Meals & at Bedtime.  Dispense: 414 mL; Refill: 1  -     Food Allergy Profile; Future  -     Food Allergy Profile    2. Other urticaria  -     Food Allergy Profile; Future  -     Food Allergy Profile    3. Type 2 diabetes mellitus with hyperglycemia, without long-term current use of insulin        Assessment & Plan  1. Gastritis.  Her  symptoms of left-sided abdominal pain and intermittent vomiting are consistent with gastritis. Previous endoscopy confirmed mild gastritis without H. pylori infection. She will continue pantoprazole as prescribed by her gastroenterologist. Additionally, sucralfate liquid 1 g (2 teaspoons) will be taken four times daily before meals and at night to coat the stomach and facilitate healing. A food allergy profile will be conducted to identify potential food sensitivities contributing to her symptoms.    2. Urticaria.  She reports itching and jitteriness, which may be related to food sensitivities or medication side effects. A food allergy profile will be conducted to identify potential allergens.    3. Diabetes Mellitus.  She has been on Jardiance 10 mg daily for approximately 9 months. Given the potential side effects of nausea, vomiting, and arthralgia, a trial discontinuation of Jardiance for one month will be initiated to assess if symptoms improve. Blood sugar levels will be monitored during this period.    4. Lactose Intolerance.  She reports gas and constipation associated with milk consumption. Lactaid milk is recommended as an alternative to regular milk to alleviate symptoms.    5. Shortness of breath.  She reports occasional shortness of breath.    6. Arthritis.  She reports severe joint pain and knee flare-ups, which may be exacerbated by Jardiance.    PROCEDURE  Endoscopy performed by a gastroenterologist revealed an infection but no other abnormalities.                Leilani GURWINDER Ordaz  reports that she has never smoked. She has been exposed to tobacco smoke. She has never used smokeless tobacco.                FOLLOW UP  Keep scheduled follow up with PCP.  Patient was given instructions and counseling regarding her condition or for health maintenance advice. Please see specific information pulled into the AVS if appropriate.       Mary Beth Hunt MD  04/07/25  22:26 EDT    Part of this note may be an  electronic transcription/translation of spoken language to printed text using the Dragon Dictation System.    Patient or patient representative verbalized consent for the use of Ambient Listening during the visit with  Mary Beth Hunt MD for chart documentation. 4/7/2025  22:27 EDT

## 2025-04-08 DIAGNOSIS — K29.00 ACUTE GASTRITIS, PRESENCE OF BLEEDING UNSPECIFIED, UNSPECIFIED GASTRITIS TYPE: ICD-10-CM

## 2025-04-08 RX ORDER — SUCRALFATE ORAL 1 G/10ML
1 SUSPENSION ORAL
Qty: 414 ML | Refills: 1 | Status: SHIPPED | OUTPATIENT
Start: 2025-04-08

## 2025-04-08 NOTE — TELEPHONE ENCOUNTER
Caller: Leilani Ordaz    Relationship: Self    Best call back number: [phone]     Requested Prescriptions:   Requested Prescriptions     Pending Prescriptions Disp Refills    sucralfate (Carafate) 1 GM/10ML suspension 414 mL 1     Sig: Take 10 mL by mouth 4 (Four) Times a Day With Meals & at Bedtime.        Pharmacy where request should be sent: [facility] [id number]  [place] - [address] AT [facility] [address]  [phone] [facility] [phone]      Last office visit with prescribing clinician: 1/14/2025   Last telemedicine visit with prescribing clinician: Visit date not found   Next office visit with prescribing clinician: 6/2/2025     Additional details provided by patient: MEDICATIONS SENT TO WRONG PHARMACY    Does the patient have less than a 3 day supply:  [x] Yes  [] No    Would you like a call back once the refill request has been completed: [] Yes [] No    If the office needs to give you a call back, can they leave a voicemail: [] Yes [] No    Carlene Davison Rep   04/08/25 08:40 EDT

## 2025-04-15 LAB
CLAM IGE QN: <0.1 KU/L
CODFISH IGE QN: <0.1 KU/L
CONV CLASS DESCRIPTION: ABNORMAL
CORN IGE QN: <0.1 KU/L
COW MILK IGE QN: 0.38 KU/L
EGG WHITE IGE QN: <0.1 KU/L
PEANUT IGE QN: <0.1 KU/L
SCALLOP IGE QN: <0.1 KU/L
SESAME SEED IGE QN: <0.1 KU/L
SHRIMP IGE QN: <0.1 KU/L
SOYBEAN IGE QN: <0.1 KU/L
WALNUT IGE QN: <0.1 KU/L
WHEAT IGE QN: <0.1 KU/L

## 2025-05-02 RX ORDER — LISINOPRIL 20 MG/1
20 TABLET ORAL DAILY
Qty: 90 TABLET | Refills: 3 | Status: SHIPPED | OUTPATIENT
Start: 2025-05-02

## 2025-05-02 RX ORDER — ATORVASTATIN CALCIUM 80 MG/1
80 TABLET, FILM COATED ORAL DAILY
Qty: 90 TABLET | Refills: 3 | Status: SHIPPED | OUTPATIENT
Start: 2025-05-02

## 2025-05-29 ENCOUNTER — LAB (OUTPATIENT)
Facility: HOSPITAL | Age: 70
End: 2025-05-29
Payer: MEDICARE

## 2025-05-29 DIAGNOSIS — E55.9 VITAMIN D DEFICIENCY: ICD-10-CM

## 2025-05-29 DIAGNOSIS — E89.0 POSTOPERATIVE HYPOTHYROIDISM: ICD-10-CM

## 2025-05-29 DIAGNOSIS — E78.2 MIXED HYPERLIPIDEMIA: ICD-10-CM

## 2025-05-29 DIAGNOSIS — Z51.81 MEDICATION MONITORING ENCOUNTER: ICD-10-CM

## 2025-05-29 DIAGNOSIS — E11.65 TYPE 2 DIABETES MELLITUS WITH HYPERGLYCEMIA, WITHOUT LONG-TERM CURRENT USE OF INSULIN: ICD-10-CM

## 2025-05-29 LAB
25(OH)D3 SERPL-MCNC: 50.2 NG/ML (ref 30–100)
ALBUMIN SERPL-MCNC: 4.4 G/DL (ref 3.5–5.2)
ALBUMIN UR-MCNC: 1.4 MG/DL
ALBUMIN/GLOB SERPL: 1.6 G/DL
ALP SERPL-CCNC: 83 U/L (ref 39–117)
ALT SERPL W P-5'-P-CCNC: 16 U/L (ref 1–33)
ANION GAP SERPL CALCULATED.3IONS-SCNC: 11.3 MMOL/L (ref 5–15)
AST SERPL-CCNC: 24 U/L (ref 1–32)
BASOPHILS # BLD AUTO: 0.04 10*3/MM3 (ref 0–0.2)
BASOPHILS NFR BLD AUTO: 0.4 % (ref 0–1.5)
BILIRUB SERPL-MCNC: 0.7 MG/DL (ref 0–1.2)
BUN SERPL-MCNC: 14 MG/DL (ref 8–23)
BUN/CREAT SERPL: 11.1 (ref 7–25)
CALCIUM SPEC-SCNC: 10.1 MG/DL (ref 8.6–10.5)
CHLORIDE SERPL-SCNC: 104 MMOL/L (ref 98–107)
CHOLEST SERPL-MCNC: 131 MG/DL (ref 0–200)
CO2 SERPL-SCNC: 24.7 MMOL/L (ref 22–29)
CREAT SERPL-MCNC: 1.26 MG/DL (ref 0.57–1)
CREAT UR-MCNC: 116.5 MG/DL
DEPRECATED RDW RBC AUTO: 39.6 FL (ref 37–54)
EGFRCR SERPLBLD CKD-EPI 2021: 46.3 ML/MIN/1.73
EOSINOPHIL # BLD AUTO: 0 10*3/MM3 (ref 0–0.4)
EOSINOPHIL NFR BLD AUTO: 0 % (ref 0.3–6.2)
ERYTHROCYTE [DISTWIDTH] IN BLOOD BY AUTOMATED COUNT: 11.9 % (ref 12.3–15.4)
GLOBULIN UR ELPH-MCNC: 2.7 GM/DL
GLUCOSE SERPL-MCNC: 119 MG/DL (ref 65–99)
HBA1C MFR BLD: 6.3 % (ref 4.8–5.6)
HCT VFR BLD AUTO: 40.2 % (ref 34–46.6)
HDLC SERPL-MCNC: 33 MG/DL (ref 40–60)
HGB BLD-MCNC: 13.4 G/DL (ref 12–15.9)
IMM GRANULOCYTES # BLD AUTO: 0.03 10*3/MM3 (ref 0–0.05)
IMM GRANULOCYTES NFR BLD AUTO: 0.3 % (ref 0–0.5)
LDLC SERPL CALC-MCNC: 72 MG/DL (ref 0–100)
LDLC/HDLC SERPL: 2.09 {RATIO}
LYMPHOCYTES # BLD AUTO: 3.27 10*3/MM3 (ref 0.7–3.1)
LYMPHOCYTES NFR BLD AUTO: 30.3 % (ref 19.6–45.3)
MCH RBC QN AUTO: 30 PG (ref 26.6–33)
MCHC RBC AUTO-ENTMCNC: 33.3 G/DL (ref 31.5–35.7)
MCV RBC AUTO: 89.9 FL (ref 79–97)
MICROALBUMIN/CREAT UR: 12 MG/G (ref 0–29)
MONOCYTES # BLD AUTO: 0.68 10*3/MM3 (ref 0.1–0.9)
MONOCYTES NFR BLD AUTO: 6.3 % (ref 5–12)
NEUTROPHILS NFR BLD AUTO: 6.78 10*3/MM3 (ref 1.7–7)
NEUTROPHILS NFR BLD AUTO: 62.7 % (ref 42.7–76)
NRBC BLD AUTO-RTO: 0 /100 WBC (ref 0–0.2)
PLATELET # BLD AUTO: 287 10*3/MM3 (ref 140–450)
PMV BLD AUTO: 11.1 FL (ref 6–12)
POTASSIUM SERPL-SCNC: 4.4 MMOL/L (ref 3.5–5.2)
PROT SERPL-MCNC: 7.1 G/DL (ref 6–8.5)
RBC # BLD AUTO: 4.47 10*6/MM3 (ref 3.77–5.28)
SODIUM SERPL-SCNC: 140 MMOL/L (ref 136–145)
T4 FREE SERPL-MCNC: 1.39 NG/DL (ref 0.92–1.68)
TRIGL SERPL-MCNC: 145 MG/DL (ref 0–150)
TSH SERPL DL<=0.05 MIU/L-ACNC: 2.98 UIU/ML (ref 0.27–4.2)
VLDLC SERPL-MCNC: 26 MG/DL (ref 5–40)
WBC NRBC COR # BLD AUTO: 10.8 10*3/MM3 (ref 3.4–10.8)

## 2025-05-29 PROCEDURE — 82306 VITAMIN D 25 HYDROXY: CPT

## 2025-05-29 PROCEDURE — 80061 LIPID PANEL: CPT

## 2025-05-29 PROCEDURE — 82043 UR ALBUMIN QUANTITATIVE: CPT

## 2025-05-29 PROCEDURE — 83036 HEMOGLOBIN GLYCOSYLATED A1C: CPT

## 2025-05-29 PROCEDURE — 80053 COMPREHEN METABOLIC PANEL: CPT

## 2025-05-29 PROCEDURE — 82570 ASSAY OF URINE CREATININE: CPT

## 2025-05-29 PROCEDURE — 84439 ASSAY OF FREE THYROXINE: CPT

## 2025-05-29 PROCEDURE — 84443 ASSAY THYROID STIM HORMONE: CPT

## 2025-05-29 PROCEDURE — 85025 COMPLETE CBC W/AUTO DIFF WBC: CPT

## 2025-06-02 ENCOUNTER — OFFICE VISIT (OUTPATIENT)
Dept: FAMILY MEDICINE CLINIC | Facility: CLINIC | Age: 70
End: 2025-06-02
Payer: MEDICARE

## 2025-06-02 VITALS
WEIGHT: 160.2 LBS | BODY MASS INDEX: 26.69 KG/M2 | DIASTOLIC BLOOD PRESSURE: 76 MMHG | SYSTOLIC BLOOD PRESSURE: 126 MMHG | OXYGEN SATURATION: 97 % | HEART RATE: 70 BPM | TEMPERATURE: 98.1 F | HEIGHT: 65 IN

## 2025-06-02 DIAGNOSIS — E89.0 POSTOPERATIVE HYPOTHYROIDISM: ICD-10-CM

## 2025-06-02 DIAGNOSIS — Z91.011 COW'S MILK ALLERGY: ICD-10-CM

## 2025-06-02 DIAGNOSIS — Z00.00 MEDICARE ANNUAL WELLNESS VISIT, SUBSEQUENT: Primary | ICD-10-CM

## 2025-06-02 DIAGNOSIS — E78.2 MIXED HYPERLIPIDEMIA: ICD-10-CM

## 2025-06-02 DIAGNOSIS — R00.2 PALPITATIONS: ICD-10-CM

## 2025-06-02 DIAGNOSIS — N18.31 TYPE 2 DIABETES MELLITUS WITH STAGE 3A CHRONIC KIDNEY DISEASE, WITHOUT LONG-TERM CURRENT USE OF INSULIN: ICD-10-CM

## 2025-06-02 DIAGNOSIS — K80.20 CALCULUS OF GALLBLADDER WITHOUT CHOLECYSTITIS WITHOUT OBSTRUCTION: ICD-10-CM

## 2025-06-02 DIAGNOSIS — E55.9 VITAMIN D DEFICIENCY: ICD-10-CM

## 2025-06-02 DIAGNOSIS — D25.9 UTERINE LEIOMYOMA, UNSPECIFIED LOCATION: ICD-10-CM

## 2025-06-02 DIAGNOSIS — Z12.31 VISIT FOR SCREENING MAMMOGRAM: ICD-10-CM

## 2025-06-02 DIAGNOSIS — I10 ESSENTIAL HYPERTENSION: ICD-10-CM

## 2025-06-02 DIAGNOSIS — E11.22 TYPE 2 DIABETES MELLITUS WITH STAGE 3A CHRONIC KIDNEY DISEASE, WITHOUT LONG-TERM CURRENT USE OF INSULIN: ICD-10-CM

## 2025-06-02 DIAGNOSIS — E11.65 TYPE 2 DIABETES MELLITUS WITH HYPERGLYCEMIA, WITHOUT LONG-TERM CURRENT USE OF INSULIN: ICD-10-CM

## 2025-06-02 DIAGNOSIS — K21.9 GASTROESOPHAGEAL REFLUX DISEASE WITHOUT ESOPHAGITIS: ICD-10-CM

## 2025-06-02 PROCEDURE — G0439 PPPS, SUBSEQ VISIT: HCPCS | Performed by: FAMILY MEDICINE

## 2025-06-02 PROCEDURE — 3078F DIAST BP <80 MM HG: CPT | Performed by: FAMILY MEDICINE

## 2025-06-02 PROCEDURE — 99213 OFFICE O/P EST LOW 20 MIN: CPT | Performed by: FAMILY MEDICINE

## 2025-06-02 PROCEDURE — 3044F HG A1C LEVEL LT 7.0%: CPT | Performed by: FAMILY MEDICINE

## 2025-06-02 PROCEDURE — 3074F SYST BP LT 130 MM HG: CPT | Performed by: FAMILY MEDICINE

## 2025-06-02 PROCEDURE — 1126F AMNT PAIN NOTED NONE PRSNT: CPT | Performed by: FAMILY MEDICINE

## 2025-06-02 PROCEDURE — 1170F FXNL STATUS ASSESSED: CPT | Performed by: FAMILY MEDICINE

## 2025-06-02 NOTE — PROGRESS NOTES
Subjective   The ABCs of the Annual Wellness Visit  Medicare Wellness Visit      Leilani Ordaz is a 69 y.o. patient who presents for a Medicare Wellness Visit.    The following portions of the patient's history were reviewed and   updated as appropriate: allergies, current medications, past family history, past medical history, past social history, past surgical history, and problem list.    Compared to one year ago, the patient's physical   health is better.  Compared to one year ago, the patient's mental   health is the same.    Recent Hospitalizations:  She was not admitted to the hospital during the last year.     Current Medical Providers:  Patient Care Team:  Js Tavares DO as PCP - General (Family Medicine)    Outpatient Medications Prior to Visit   Medication Sig Dispense Refill    albuterol sulfate  (90 Base) MCG/ACT inhaler Inhale 2 puffs Every 4 (Four) Hours As Needed for Wheezing. 8.5 g 1    Aspirin Low Dose 81 MG EC tablet Take 1 tablet by mouth Daily. 90 tablet 3    atorvastatin (LIPITOR) 80 MG tablet TAKE 1 TABLET BY MOUTH DAILY 90 tablet 3    Diclofenac Sodium (VOLTAREN) 1 % gel gel Apply 4 g topically to the appropriate area as directed 4 (Four) Times a Day As Needed (joint pain). 100 g 1    levothyroxine (Synthroid) 50 MCG tablet Take 1 tablet by mouth Daily. 90 tablet 3    lisinopril (PRINIVIL,ZESTRIL) 20 MG tablet TAKE 1 TABLET BY MOUTH DAILY 90 tablet 3    metFORMIN (GLUCOPHAGE) 1000 MG tablet TAKE 1 TABLET BY MOUTH 2 TIMES A  tablet 3    multivitamin with minerals tablet tablet Take 1 tablet by mouth Daily.      pantoprazole (Protonix) 40 MG EC tablet Take 1 tablet by mouth Daily. 30 tablet 5    Omega-3 Fatty Acids (fish oil) 1000 MG capsule capsule Take 1 capsule by mouth 2 (Two) Times a Day With Meals. (Patient not taking: Reported on 6/2/2025)      sucralfate (Carafate) 1 GM/10ML suspension Take 10 mL by mouth 4 (Four) Times a Day With Meals & at Bedtime. (Patient  "not taking: Reported on 6/2/2025) 414 mL 1    empagliflozin (Jardiance) 10 MG tablet tablet Take 1 tablet by mouth Daily. 90 tablet 3     No facility-administered medications prior to visit.     No opioid medication identified on active medication list. I have reviewed chart for other potential  high risk medication/s and harmful drug interactions in the elderly.      Aspirin is on active medication list. Aspirin use is indicated based on review of current medical condition/s. Pros and cons of this therapy have been discussed today. Benefits of this medication outweigh potential harm.  Patient has been encouraged to continue taking this medication.  .      Patient Active Problem List   Diagnosis    Type 2 diabetes mellitus with stage 3 chronic kidney disease, without long-term current use of insulin    Seizure    Seasonal allergic rhinitis    Migraine    Hypertension    Hyperlipidemia    Ganglion cyst    Diverticulosis    Cataract    Arthritis    Esophageal reflux    Chronic bilateral low back pain with right-sided sciatica    Renal cyst, right    Colon cancer screening    Encounter for screening for malignant neoplasm of colon    History of colon polyps    Colloid thyroid nodule    Stage 3a chronic kidney disease    Left upper quadrant abdominal pain     Advance Care Planning Advance Directive is not on file.  ACP discussion was held with the patient during this visit. Patient does not have an advance directive, information provided.            Objective   Vitals:    06/02/25 1022   BP: 126/76   Pulse: 70   Temp: 98.1 °F (36.7 °C)   TempSrc: Oral   SpO2: 97%   Weight: 72.7 kg (160 lb 3.2 oz)   Height: 165.1 cm (65\")   PainSc: 0-No pain       Estimated body mass index is 26.66 kg/m² as calculated from the following:    Height as of this encounter: 165.1 cm (65\").    Weight as of this encounter: 72.7 kg (160 lb 3.2 oz).                Does the patient have evidence of cognitive impairment? No  Lab Results   Component " Value Date    TRIG 145 2025    HDL 33 (L) 2025    LDL 72 2025    VLDL 26 2025    HGBA1C 6.30 (H) 2025                                                                                                Health  Risk Assessment    Smoking Status:  Social History     Tobacco Use   Smoking Status Never    Passive exposure: Past   Smokeless Tobacco Never     Alcohol Consumption:  Social History     Substance and Sexual Activity   Alcohol Use Not Currently    Comment: LIQUOR BUT VARY RARELY       Fall Risk Screen  GABRIELAADI Fall Risk Assessment was completed, and patient is at LOW risk for falls.Assessment completed on:2025    Depression Screening   Little interest or pleasure in doing things? Not at all   Feeling down, depressed, or hopeless? Not at all   PHQ-2 Total Score 0      Health Habits and Functional and Cognitive Screenin/2/2025    10:27 AM   Functional & Cognitive Status   Do you have difficulty preparing food and eating? No   Do you have difficulty bathing yourself, getting dressed or grooming yourself? No   Do you have difficulty using the toilet? No   Do you have difficulty moving around from place to place? No   Do you have trouble with steps or getting out of a bed or a chair? Yes   Current Diet Well Balanced Diet   Dental Exam Not up to date   Eye Exam Up to date   Exercise (times per week) 3 times per week   Current Exercises Include Walking   Do you need help using the phone?  No   Are you deaf or do you have serious difficulty hearing?  No   Do you need help to go to places out of walking distance? No   Do you need help shopping? No   Do you need help preparing meals?  No   Do you need help with housework?  No   Do you need help with laundry? No   Do you need help taking your medications? No   Do you need help managing money? No   Do you ever drive or ride in a car without wearing a seat belt? No   Have you felt unusual stress, anger or loneliness in the last month?  No   Who do you live with? Spouse   If you need help, do you have trouble finding someone available to you? No   Have you been bothered in the last four weeks by sexual problems? No   Do you have difficulty concentrating, remembering or making decisions? No           Age-appropriate Screening Schedule:  Refer to the list below for future screening recommendations based on patient's age, sex and/or medical conditions. Orders for these recommended tests are listed in the plan section. The patient has been provided with a written plan.    Health Maintenance List  Health Maintenance   Topic Date Due    ZOSTER VACCINE (1 of 2) Never done    DIABETIC EYE EXAM  07/12/2024    DXA SCAN  07/25/2025    COVID-19 Vaccine (3 - 2024-25 season) 12/02/2025 (Originally 9/1/2024)    INFLUENZA VACCINE  07/01/2025    DIABETIC FOOT EXAM  10/07/2025    HEMOGLOBIN A1C  11/29/2025    LIPID PANEL  05/29/2026    URINE MICROALBUMIN-CREATININE RATIO (uACR)  05/29/2026    ANNUAL WELLNESS VISIT  06/02/2026    MAMMOGRAM  08/21/2026    COLORECTAL CANCER SCREENING  01/31/2029    TDAP/TD VACCINES (2 - Td or Tdap) 04/24/2033    HEPATITIS C SCREENING  Completed    Pneumococcal Vaccine 50+  Completed                                                                                                                                                CMS Preventative Services Quick Reference  Risk Factors Identified During Encounter  Polypharmacy: Medication List reviewed    The above risks/problems have been discussed with the patient.  Pertinent information has been shared with the patient in the After Visit Summary.  An After Visit Summary and PPPS were made available to the patient.    Follow Up:   Next Medicare Wellness visit to be scheduled in 1 year.          Additional E&M Note during same encounter follows:  Patient has multiple medical problems which are significant and separately identifiable that require additional work above and beyond the Medicare  Wellness Visit.      Chief Complaint  Medicare Wellness-subsequent    Leilani Ordaz is a 69 y.o. female who presents to [facility]     History of Present Illness  The patient is a 69-year-old female who presents today for a Medicare annual wellness visit.    She reports an improvement in her physical health compared to the previous year, with no changes in her mental health status. She has not required hospitalization within the past 365 days and does not have an advanced directive in place. She manages her activities of daily living without difficulty and reports no depressive symptoms. She declines the COVID-19 vaccine and reports no concerns related to her breasts.     She has been experiencing constipation, which she attributes to dairy consumption, leading her to reduce her intake. She was previously a heavy milk consumer. She was prescribed Carafate, which effectively managed her vomiting, but she continues to experience occasional side pain, which she suspects may be diet-related. She has large calcified uterine fibroids but reports no associated vaginal bleeding. She experiences occasional shooting pain in the area but does not consider it a significant concern at this time.    She was previously on Jardiance but discontinued it due to suspected gastrointestinal side effects. She has experienced palpitations on a few occasions since discontinuing Jardiance, including one episode while seated after outdoor activity. She reports feeling well today and does not believe the palpitations are causing lightheadedness. She is not experiencing chest pain and does not have any cardiac implants.    She continues to take pantoprazole for acid reflux, which she reports as well-controlled. She is currently on levothyroxine 50 mcg daily and metformin 1000 mg twice daily.    PAST SURGICAL HISTORY:  - EGD on 02/14/2025 showing gastritis and duodenitis  - CT scan in 01/2025 showing  "diverticulosis  - Stress test in 2023 showing no evidence of myocardial ischemia  - Carotid Doppler showing normal flow without evidence of hemodynamically significant stenosis  - Echocardiogram showing left ventricular hypertrophy with normal left ventricular systolic function  - CT of the lumbar spine on 12/03/2018 showing fibroids    Objective   Vital Signs:   Vitals:    06/02/25 1022   BP: 126/76   Pulse: 70   Temp: 98.1 °F (36.7 °C)   TempSrc: Oral   SpO2: 97%   Weight: 72.7 kg (160 lb 3.2 oz)   Height: 165.1 cm (65\")   PainSc: 0-No pain       Wt Readings from Last 3 Encounters:   06/02/25 72.7 kg (160 lb 3.2 oz)   04/07/25 71.4 kg (157 lb 4.8 oz)   02/14/25 71.3 kg (157 lb 3 oz)     BP Readings from Last 3 Encounters:   06/02/25 126/76   04/07/25 132/70   02/14/25 158/80       Physical Exam  Vitals reviewed.   Constitutional:       Appearance: Normal appearance.   HENT:      Head: Normocephalic and atraumatic.      Right Ear: External ear normal.      Left Ear: External ear normal.      Nose: Nose normal.   Eyes:      Conjunctiva/sclera: Conjunctivae normal.   Cardiovascular:      Rate and Rhythm: Normal rate and regular rhythm.      Heart sounds: No murmur heard.     No friction rub. No gallop.   Pulmonary:      Effort: Pulmonary effort is normal.      Breath sounds: Normal breath sounds. No wheezing or rhonchi.   Abdominal:      General: Bowel sounds are normal. There is no distension.      Palpations: Abdomen is soft.      Tenderness: There is no abdominal tenderness.   Skin:     General: Skin is warm and dry.   Neurological:      Mental Status: She is alert and oriented to person, place, and time.      Cranial Nerves: No cranial nerve deficit.   Psychiatric:         Mood and Affect: Mood and affect normal.         Behavior: Behavior normal.         Thought Content: Thought content normal.         Judgment: Judgment normal.         Physical Exam      The following data was reviewed by Js Tavares DO " on 2025  Common Labs   Common labs          2024    08:58 2025    13:20 2025    08:54   Common Labs   Glucose 99  169  119    BUN 17  12  14.0    Creatinine 1.11  1.16  1.26    Sodium 143  138  140    Potassium 4.3  4.3  4.4    Chloride 106  102  104    Calcium 9.9  9.9  10.1    Albumin 4.0  4.2  4.4    Total Bilirubin 0.4  0.5  0.7    Alkaline Phosphatase 69  77  83    AST (SGOT) 17  24  24    ALT (SGPT) 12  17  16    WBC 9.49  10.90  10.80    Hemoglobin 13.8  14.6  13.4    Hematocrit 41.2  44.3  40.2    Platelets 303  329  287    Total Cholesterol 129   131    Triglycerides 98   145    HDL Cholesterol 34   33    LDL Cholesterol  76   72    Hemoglobin A1C 6.20   6.30    Microalbumin, Urine   1.4        Results  Labs   - Food allergy panel: 2025, Low cow's milk allergy   - Vitamin D level: Normal   - TSH levels: Within normal limits   - Free T4 level: Normal   - A1c: 6.3   - Lipid panel - LDL cholesterol: 72   - Lipid panel - HDL cholesterol: Low   - Kidney function: Stable   - Blood counts: Normal    Imaging   - CT scan: 2025, Diverticulosis but no diverticulitis   - EGD: 2025, Gastritis, duodenitis, negative for H. pylori, negative for intestinal metaplasia, dysplasia, malignancy, reactive gastropathy, mild chronic inactive gastritis   - CT scan: Large gallstones   - Carotid Doppler: Normal flow without evidence of hemodynamically significant stenosis on the left and the right side    Diagnostic Testing   - EK, Sinus rhythm   - Stress test: , No evidence of myocardial ischemia, negative clinical evidence for myocardial ischemia   - Echocardiogram: Left ventricular hypertrophy with normal left ventricular systolic function        Assessment & Plan   Diagnoses and all orders for this visit:    1. Medicare annual wellness visit, subsequent (Primary)    2. Visit for screening mammogram  -     Mammo Screening Digital Tomosynthesis Bilateral With CAD; Future    3. Cow's  milk allergy    4. Type 2 diabetes mellitus with hyperglycemia, without long-term current use of insulin  -     CBC & Differential; Future  -     Comprehensive Metabolic Panel; Future  -     Hemoglobin A1c; Future    5. Gastroesophageal reflux disease without esophagitis    6. Vitamin D deficiency  -     Vitamin D,25-Hydroxy; Future    7. Postoperative hypothyroidism  -     TSH+Free T4; Future    8. Type 2 diabetes mellitus with stage 3a chronic kidney disease, without long-term current use of insulin    9. Mixed hyperlipidemia  -     Lipid Panel; Future    10. Essential hypertension    11. Calculus of gallbladder without cholecystitis without obstruction    12. Palpitations  -     Holter Monitor - 72 Hour Up To 15 Days; Future    13. Uterine leiomyoma, unspecified location        Assessment & Plan  1. Medicare annual wellness visit.  - Her overall health status is satisfactory, with no significant concerns raised during this visit.  - Thyroid function tests, including TSH and free T4, are within normal limits, suggesting well-managed thyroid health.  - Kidney function remains stable, as evidenced by normal blood counts and the absence of proteinuria.  - A routine mammogram will be scheduled for the end of August or early September 2025.    2. Palpitations.  - Reports occasional palpitations since discontinuing Jardiance.  - Previous EKG showed sinus rhythm; stress test in 2023 showed no evidence of myocardial ischemia.  - A Holter monitor will be used for 3 days to assess for any arrhythmias.  - No chest pains or significant symptoms reported.    3. Gastroesophageal Reflux Disease (GERD).  - EGD on 02/14/2025 showed gastritis and duodenitis; pathology report negative for H. pylori, intestinal metaplasia, dysplasia, malignancy.  - She will continue taking pantoprazole as it effectively manages her symptoms.  - Reactive gastropathy and mild chronic inactive gastritis noted.    4. Diabetes Mellitus.  - A1c is 6.3,  slightly above target range.  - She will continue taking metformin 1000 mg twice a day.  - Jardiance will be held off for now due to previous side effects.  - Labs will be repeated in 3 months to monitor her condition.    5. Hyperlipidemia.  - LDL is 72, and HDL is slightly low.  - She will continue taking atorvastatin 80 mg daily.  - Increasing exercise is recommended to improve HDL levels.    6. Hypertension.  - Blood pressure is adequately controlled.  - She will continue taking lisinopril 20 mg daily.    7. Hypothyroidism.  - Thyroid levels are within normal limits.  - She will continue taking levothyroxine 50 mcg daily.    Follow-up  - The patient will follow up in 3 months.               FOLLOW UP  Return in about 3 months (around 9/2/2025) for diabetes.  Patient was given instructions and counseling regarding her condition or for health maintenance advice. Please see specific information pulled into the AVS if appropriate.     Patient or patient representative verbalized consent for the use of Ambient Listening during the visit with  Js Tavares DO for chart documentation. 6/2/2025  10:36 EDT    Js Tavares DO  06/02/25  10:52 EDT

## 2025-07-21 DIAGNOSIS — E03.9 HYPOTHYROIDISM (ACQUIRED): ICD-10-CM

## 2025-07-21 RX ORDER — LEVOTHYROXINE SODIUM 50 UG/1
50 TABLET ORAL DAILY
Qty: 90 TABLET | Refills: 3 | Status: SHIPPED | OUTPATIENT
Start: 2025-07-21

## 2025-07-21 RX ORDER — LEVOTHYROXINE SODIUM 50 UG/1
50 TABLET ORAL DAILY
Qty: 90 TABLET | Refills: 3 | Status: CANCELLED | OUTPATIENT
Start: 2025-07-21

## 2025-07-21 NOTE — TELEPHONE ENCOUNTER
Caller: Leilani Ordaz    Relationship: Self    Best call back number: [phone]    Requested Prescriptions:   Requested Prescriptions     Pending Prescriptions Disp Refills    levothyroxine (Synthroid) 50 MCG tablet 90 tablet 3     Sig: Take 1 tablet by mouth Daily.        Pharmacy where request should be sent: [facility] [id number] [place] - [address] AT [facility] [address] - [phone] [facility] [phone]      Last office visit with prescribing clinician: 6/2/2025   Last telemedicine visit with prescribing clinician: Visit date not found   Next office visit with prescribing clinician: 9/2/2025     Additional details provided by patient: DR. MARTINEZ NEEDS TO SEND A NEW PRESCRIPTION FOR THIS MEDICATION THE PHARMACY STILL HAS THE PREVIOUS PRESCRIBER ON FILE     Does the patient have less than a 3 day supply:  [x] Yes  [] No    Would you like a call back once the refill request has been completed: [] Yes [x] No    If the office needs to give you a call back, can they leave a voicemail: [] Yes [x] No    Carlene Leal Rep   07/21/25 15:57 EDT

## 2025-08-11 ENCOUNTER — APPOINTMENT (OUTPATIENT)
Dept: ULTRASOUND IMAGING | Facility: HOSPITAL | Age: 70
End: 2025-08-11
Payer: MEDICARE

## 2025-08-11 ENCOUNTER — HOSPITAL ENCOUNTER (OUTPATIENT)
Facility: HOSPITAL | Age: 70
Discharge: HOME | End: 2025-08-11
Admitting: FAMILY MEDICINE
Payer: MEDICARE

## 2025-08-11 DIAGNOSIS — R00.2 PALPITATIONS: ICD-10-CM

## 2025-08-11 PROCEDURE — 93242 EXT ECG>48HR<7D RECORDING: CPT

## 2025-08-11 RX ORDER — ALBUTEROL SULFATE 90 UG/1
2 INHALANT RESPIRATORY (INHALATION) EVERY 4 HOURS PRN
Qty: 8.5 G | Refills: 1 | Status: SHIPPED | OUTPATIENT
Start: 2025-08-11

## 2025-08-18 LAB
CV ZIO BASELINE AVG BPM: 76 BPM
CV ZIO BASELINE BPM HIGH: 158 BPM
CV ZIO BASELINE BPM LOW: 51 BPM
CV ZIO DEVICE ANALYSIS TIME: NORMAL
CV ZIO ECT SVE COUNT: 3544 EPISODES
CV ZIO ECT SVE CPLT COUNT: 15 EPISODES
CV ZIO ECT SVE CPLT FREQ: NORMAL
CV ZIO ECT SVE FREQ: NORMAL
CV ZIO ECT SVE TPLT COUNT: 11 EPISODES
CV ZIO ECT SVE TPLT FREQ: NORMAL
CV ZIO ECT VE COUNT: 22 EPISODES
CV ZIO ECT VE CPLT COUNT: 0 EPISODES
CV ZIO ECT VE CPLT FREQ: 0
CV ZIO ECT VE FREQ: NORMAL
CV ZIO ECT VE TPLT COUNT: 0 EPISODES
CV ZIO ECT VE TPLT FREQ: 0
CV ZIO ECTOPIC SVE COUPLET RAW PERCENT: 0.01 %
CV ZIO ECTOPIC SVE ISOLATED PERCENT: 1.1 %
CV ZIO ECTOPIC SVE TRIPLET RAW PERCENT: 0.01 %
CV ZIO ECTOPIC VE COUPLET RAW PERCENT: 0 %
CV ZIO ECTOPIC VE ISOLATED PERCENT: 0.01 %
CV ZIO ECTOPIC VE TRIPLET RAW PERCENT: 0 %
CV ZIO ENROLLMENT END: NORMAL
CV ZIO ENROLLMENT START: NORMAL
CV ZIO PATIENT EVENTS DIARIES: 1
CV ZIO PATIENT EVENTS TRIGGERS: 10
CV ZIO PAUSE COUNT: 0
CV ZIO PRESCRIPTION STATUS: NORMAL
CV ZIO SVT AVG BPM: 136 BPM
CV ZIO SVT BPM HIGH: 158 BPM
CV ZIO SVT BPM LOW: 117 BPM
CV ZIO SVT COUNT: 3
CV ZIO SVT F EPI AVG BPM: 151 BPM
CV ZIO SVT F EPI BEATS: 9 BEATS
CV ZIO SVT F EPI BPM HIGH: 158 BPM
CV ZIO SVT F EPI BPM LOW: 141 BPM
CV ZIO SVT F EPI DUR: 3.2 SEC
CV ZIO SVT F EPI END: NORMAL
CV ZIO SVT F EPI START: NORMAL
CV ZIO SVT L EPI AVG BPM: 125 BPM
CV ZIO SVT L EPI BEATS: 19 BEATS
CV ZIO SVT L EPI BPM HIGH: 148 BPM
CV ZIO SVT L EPI BPM LOW: 117 BPM
CV ZIO SVT L EPI DUR: 9.4 SEC
CV ZIO SVT L EPI END: NORMAL
CV ZIO SVT L EPI START: NORMAL
CV ZIO TOTAL  ENROLLMENT PERIOD: NORMAL
CV ZIO VT COUNT: 0

## 2025-08-28 ENCOUNTER — LAB (OUTPATIENT)
Dept: LAB | Facility: HOSPITAL | Age: 70
End: 2025-08-28
Payer: MEDICARE

## 2025-08-28 DIAGNOSIS — E89.0 POSTOPERATIVE HYPOTHYROIDISM: ICD-10-CM

## 2025-08-28 DIAGNOSIS — E55.9 VITAMIN D DEFICIENCY: ICD-10-CM

## 2025-08-28 DIAGNOSIS — E78.2 MIXED HYPERLIPIDEMIA: ICD-10-CM

## 2025-08-28 DIAGNOSIS — E11.65 TYPE 2 DIABETES MELLITUS WITH HYPERGLYCEMIA, WITHOUT LONG-TERM CURRENT USE OF INSULIN: ICD-10-CM

## 2025-08-28 LAB
25(OH)D3 SERPL-MCNC: 55.8 NG/ML (ref 30–100)
ALBUMIN SERPL-MCNC: 4.3 G/DL (ref 3.5–5.2)
ALBUMIN/GLOB SERPL: 1.5 G/DL
ALP SERPL-CCNC: 66 U/L (ref 39–117)
ALT SERPL W P-5'-P-CCNC: 14 U/L (ref 1–33)
ANION GAP SERPL CALCULATED.3IONS-SCNC: 13.4 MMOL/L (ref 5–15)
AST SERPL-CCNC: 25 U/L (ref 1–32)
BASOPHILS # BLD AUTO: 0.04 10*3/MM3 (ref 0–0.2)
BASOPHILS NFR BLD AUTO: 0.4 % (ref 0–1.5)
BILIRUB SERPL-MCNC: 0.5 MG/DL (ref 0–1.2)
BUN SERPL-MCNC: 17 MG/DL (ref 8–23)
BUN/CREAT SERPL: 13.2 (ref 7–25)
CALCIUM SPEC-SCNC: 9.9 MG/DL (ref 8.6–10.5)
CHLORIDE SERPL-SCNC: 101 MMOL/L (ref 98–107)
CHOLEST SERPL-MCNC: 124 MG/DL (ref 0–200)
CO2 SERPL-SCNC: 22.6 MMOL/L (ref 22–29)
CREAT SERPL-MCNC: 1.29 MG/DL (ref 0.57–1)
DEPRECATED RDW RBC AUTO: 38.8 FL (ref 37–54)
EGFRCR SERPLBLD CKD-EPI 2021: 45 ML/MIN/1.73
EOSINOPHIL # BLD AUTO: 0.25 10*3/MM3 (ref 0–0.4)
EOSINOPHIL NFR BLD AUTO: 2.6 % (ref 0.3–6.2)
ERYTHROCYTE [DISTWIDTH] IN BLOOD BY AUTOMATED COUNT: 11.9 % (ref 12.3–15.4)
GLOBULIN UR ELPH-MCNC: 2.8 GM/DL
GLUCOSE SERPL-MCNC: 105 MG/DL (ref 65–99)
HBA1C MFR BLD: 6.5 % (ref 4.8–5.6)
HCT VFR BLD AUTO: 37.9 % (ref 34–46.6)
HDLC SERPL-MCNC: 38 MG/DL (ref 40–60)
HGB BLD-MCNC: 12.6 G/DL (ref 12–15.9)
IMM GRANULOCYTES # BLD AUTO: 0.03 10*3/MM3 (ref 0–0.05)
IMM GRANULOCYTES NFR BLD AUTO: 0.3 % (ref 0–0.5)
LDLC SERPL CALC-MCNC: 62 MG/DL (ref 0–100)
LDLC/HDLC SERPL: 1.55 {RATIO}
LYMPHOCYTES # BLD AUTO: 3.06 10*3/MM3 (ref 0.7–3.1)
LYMPHOCYTES NFR BLD AUTO: 31.9 % (ref 19.6–45.3)
MCH RBC QN AUTO: 29.6 PG (ref 26.6–33)
MCHC RBC AUTO-ENTMCNC: 33.2 G/DL (ref 31.5–35.7)
MCV RBC AUTO: 89.2 FL (ref 79–97)
MONOCYTES # BLD AUTO: 0.72 10*3/MM3 (ref 0.1–0.9)
MONOCYTES NFR BLD AUTO: 7.5 % (ref 5–12)
NEUTROPHILS NFR BLD AUTO: 5.49 10*3/MM3 (ref 1.7–7)
NEUTROPHILS NFR BLD AUTO: 57.3 % (ref 42.7–76)
NRBC BLD AUTO-RTO: 0 /100 WBC (ref 0–0.2)
PLATELET # BLD AUTO: 311 10*3/MM3 (ref 140–450)
PMV BLD AUTO: 10.6 FL (ref 6–12)
POTASSIUM SERPL-SCNC: 4.7 MMOL/L (ref 3.5–5.2)
PROT SERPL-MCNC: 7.1 G/DL (ref 6–8.5)
RBC # BLD AUTO: 4.25 10*6/MM3 (ref 3.77–5.28)
SODIUM SERPL-SCNC: 137 MMOL/L (ref 136–145)
T4 FREE SERPL-MCNC: 1.47 NG/DL (ref 0.92–1.68)
TRIGL SERPL-MCNC: 136 MG/DL (ref 0–150)
TSH SERPL DL<=0.05 MIU/L-ACNC: 3.36 UIU/ML (ref 0.27–4.2)
VLDLC SERPL-MCNC: 24 MG/DL (ref 5–40)
WBC NRBC COR # BLD AUTO: 9.59 10*3/MM3 (ref 3.4–10.8)

## 2025-08-28 PROCEDURE — 84443 ASSAY THYROID STIM HORMONE: CPT

## 2025-08-28 PROCEDURE — 83036 HEMOGLOBIN GLYCOSYLATED A1C: CPT

## 2025-08-28 PROCEDURE — 84439 ASSAY OF FREE THYROXINE: CPT

## 2025-08-28 PROCEDURE — 85025 COMPLETE CBC W/AUTO DIFF WBC: CPT

## 2025-08-28 PROCEDURE — 80053 COMPREHEN METABOLIC PANEL: CPT

## 2025-08-28 PROCEDURE — 80061 LIPID PANEL: CPT

## 2025-08-28 PROCEDURE — 82306 VITAMIN D 25 HYDROXY: CPT

## (undated) DEVICE — CONN JET HYDRA H20 AUXILIARY DISP

## (undated) DEVICE — SOLIDIFIER LIQLOC PLS 1500CC BT

## (undated) DEVICE — DEV OPN LIGASURE DISSCT EXACT 40DEG 21.6MM BX/1

## (undated) DEVICE — SNAR POLYP CAPTIFLEX XS/OVL 11X2.4MM 240CM 1P/U

## (undated) DEVICE — VAGINAL PREP TRAY: Brand: MEDLINE INDUSTRIES, INC.

## (undated) DEVICE — Device: Brand: DEFENDO AIR/WATER/SUCTION AND BIOPSY VALVE

## (undated) DEVICE — SOL IRRG H2O PL/BG 1000ML STRL

## (undated) DEVICE — THE SINGLE USE ETRAP – POLYP TRAP IS USED FOR SUCTION RETRIEVAL OF ENDOSCOPICALLY REMOVED POLYPS.: Brand: ETRAP

## (undated) DEVICE — Device

## (undated) DEVICE — SOL IRR NACL 0.9PCT BO 1000ML

## (undated) DEVICE — SINGLE-USE BIOPSY FORCEPS: Brand: RADIAL JAW 4

## (undated) DEVICE — LINER SURG CANSTR SXN S/RIGD 1500CC

## (undated) DEVICE — SUT ETHLN 2/0 FS 18IN 664H

## (undated) DEVICE — PROBE 8225825 3PK INCREMT STD PRASS ROHS

## (undated) DEVICE — SUT VIC 4/0 P3 18IN J494G

## (undated) DEVICE — SPONGE,DISSECTOR,ROUND CHERRY,XR,ST,5/PK: Brand: MEDLINE

## (undated) DEVICE — JACKSON-PRATT 100CC BULB RESERVOIR: Brand: CARDINAL HEALTH

## (undated) DEVICE — SLV SCD KN/LEN ADJ EXPRSS BLENDED MD 1P/U

## (undated) DEVICE — RETR RNG GEN2 18.6X8.9CM

## (undated) DEVICE — STERILE POLYISOPRENE POWDER-FREE SURGICAL GLOVES WITH EMOLLIENT COATING: Brand: PROTEXIS

## (undated) DEVICE — GOWN,REINFRCE,POLY,SIRUS,BREATH SLV,XXLG: Brand: MEDLINE

## (undated) DEVICE — DRSNG SURESITE WNDW 4X4.5

## (undated) DEVICE — THE STERILE LIGHT HANDLE COVER IS USED WITH STERIS SURGICAL LIGHTING AND VISUALIZATION SYSTEMS.

## (undated) DEVICE — GLV SURG BIOGEL LTX PF 7 1/2

## (undated) DEVICE — SUT PROLN 6/0 P1 18IN 8697G

## (undated) DEVICE — RETR STAY ELAS SLD/BLD 6.5X16MM PK/4

## (undated) DEVICE — ELECTRD BLD EZ CLN MOD XLNG 2.75IN

## (undated) DEVICE — DRAIN JACKSON PRATT 10FR 1/8END: Brand: CARDINAL HEALTH

## (undated) DEVICE — GLV SURG SENSICARE PI ORTHO SZ8 LF STRL

## (undated) DEVICE — GLV SURG SENSICARE PI ORTHO SZ6.5 LF STRL

## (undated) DEVICE — ENT-LF: Brand: MEDLINE INDUSTRIES, INC.

## (undated) DEVICE — BLCK/BITE BLOX WO/DENTL/RIM W/STRAP 54F

## (undated) DEVICE — PENCL E/S SMOKEEVAC W/TELESCP CANN

## (undated) DEVICE — SUT VIC COAT 5/0 P3 18IN